# Patient Record
Sex: MALE | Race: WHITE
[De-identification: names, ages, dates, MRNs, and addresses within clinical notes are randomized per-mention and may not be internally consistent; named-entity substitution may affect disease eponyms.]

---

## 2017-10-18 ENCOUNTER — TRANSCRIPTION ENCOUNTER (OUTPATIENT)
Age: 67
End: 2017-10-18

## 2017-12-22 ENCOUNTER — TRANSCRIPTION ENCOUNTER (OUTPATIENT)
Age: 67
End: 2017-12-22

## 2018-04-28 ENCOUNTER — EMERGENCY (EMERGENCY)
Facility: HOSPITAL | Age: 68
LOS: 0 days | Discharge: HOME | End: 2018-04-28
Attending: EMERGENCY MEDICINE | Admitting: EMERGENCY MEDICINE

## 2018-04-28 VITALS — HEART RATE: 89 BPM | DIASTOLIC BLOOD PRESSURE: 80 MMHG | SYSTOLIC BLOOD PRESSURE: 125 MMHG

## 2018-04-28 VITALS
TEMPERATURE: 98 F | HEART RATE: 120 BPM | SYSTOLIC BLOOD PRESSURE: 138 MMHG | HEIGHT: 68 IN | WEIGHT: 179.9 LBS | OXYGEN SATURATION: 96 % | RESPIRATION RATE: 18 BRPM | DIASTOLIC BLOOD PRESSURE: 90 MMHG

## 2018-04-28 DIAGNOSIS — M79.674 PAIN IN RIGHT TOE(S): ICD-10-CM

## 2018-04-28 DIAGNOSIS — Z79.899 OTHER LONG TERM (CURRENT) DRUG THERAPY: ICD-10-CM

## 2018-04-28 DIAGNOSIS — L53.9 ERYTHEMATOUS CONDITION, UNSPECIFIED: ICD-10-CM

## 2018-04-28 DIAGNOSIS — R25.1 TREMOR, UNSPECIFIED: ICD-10-CM

## 2018-04-28 DIAGNOSIS — M79.89 OTHER SPECIFIED SOFT TISSUE DISORDERS: ICD-10-CM

## 2018-04-28 DIAGNOSIS — R00.0 TACHYCARDIA, UNSPECIFIED: ICD-10-CM

## 2018-04-28 RX ORDER — COLCHICINE 0.6 MG
1.2 TABLET ORAL ONCE
Qty: 0 | Refills: 0 | Status: COMPLETED | OUTPATIENT
Start: 2018-04-28 | End: 2018-04-28

## 2018-04-28 RX ORDER — COLCHICINE 0.6 MG
1 TABLET ORAL
Qty: 6 | Refills: 0
Start: 2018-04-28 | End: 2018-04-30

## 2018-04-28 RX ADMIN — Medication 1.2 MILLIGRAM(S): at 08:09

## 2018-04-28 NOTE — ED PROVIDER NOTE - NS ED ROS FT
Cardiac:  No chest pain  Respiratory:  No cough   GI:  No nausea, vomiting, diarrhea or abdominal pain.  MS:  No back pain.  Neuro:  No headache  Endocrine: No history of thyroid disease or diabetes.

## 2018-04-28 NOTE — ED PROVIDER NOTE - OBJECTIVE STATEMENT
68 y M pmh including bipolar disorder, gout, cc swelling and pain of right 1st toe x 6 days, says feels same as gout in past. says he had a similar swelling and pain to left 1st toe two days prior to onset of current symptoms that self resolved. no fever, no nausea or vomiting.

## 2018-04-28 NOTE — ED PROVIDER NOTE - PHYSICAL EXAMINATION
CONSTITUTIONAL: Well-developed; well-nourished; in no acute distress.   SKIN: warm, dry  CARD: S1, S2 normal; no murmurs, gallops, or rubs. Regular rate and rhythm.   RESP: No wheezes, rales or rhonchi.  EXT: scant erythema and swelling to base of right 1st toe, overlying MTP joint, painful to light brushing and palpation, normal neurovascular exam right foot   LYMPH: No acute cervical adenopathy.  NEURO: Alert, oriented, grossly unremarkable  PSYCH: Cooperative, appropriate.

## 2018-04-28 NOTE — ED PROVIDER NOTE - ATTENDING CONTRIBUTION TO CARE
I personally evaluated the patient. I reviewed the Resident’s or Physician Assistant’s note (as assigned above), and agree with the findings and plan except as documented in my note.  Pt with h/o gout with podagra on right, has small ecchymosis though does not recal trauma, xr with no fracture, will give colchicine; tachycardia noted, pt reports anxiety and PTSD and is tremulous in ED, denies etoh/benzo use or recent cessation, states "I'm an anxious person," no fever. Patient counseled regarding conditions which should prompt return.

## 2018-05-01 ENCOUNTER — TRANSCRIPTION ENCOUNTER (OUTPATIENT)
Age: 68
End: 2018-05-01

## 2018-06-19 ENCOUNTER — TRANSCRIPTION ENCOUNTER (OUTPATIENT)
Age: 68
End: 2018-06-19

## 2019-11-25 ENCOUNTER — OUTPATIENT (OUTPATIENT)
Dept: OUTPATIENT SERVICES | Facility: HOSPITAL | Age: 69
LOS: 1 days | Discharge: HOME | End: 2019-11-25

## 2019-11-25 DIAGNOSIS — M10.9 GOUT, UNSPECIFIED: ICD-10-CM

## 2019-11-25 DIAGNOSIS — E88.1 LIPODYSTROPHY, NOT ELSEWHERE CLASSIFIED: ICD-10-CM

## 2019-11-25 DIAGNOSIS — D64.9 ANEMIA, UNSPECIFIED: ICD-10-CM

## 2019-11-25 DIAGNOSIS — E11.9 TYPE 2 DIABETES MELLITUS WITHOUT COMPLICATIONS: ICD-10-CM

## 2019-11-25 DIAGNOSIS — I10 ESSENTIAL (PRIMARY) HYPERTENSION: ICD-10-CM

## 2019-11-25 DIAGNOSIS — M81.8 OTHER OSTEOPOROSIS WITHOUT CURRENT PATHOLOGICAL FRACTURE: ICD-10-CM

## 2019-11-27 ENCOUNTER — EMERGENCY (EMERGENCY)
Facility: HOSPITAL | Age: 69
LOS: 1 days | Discharge: HOME | End: 2019-11-27
Admitting: EMERGENCY MEDICINE
Payer: MEDICARE

## 2019-11-27 VITALS
OXYGEN SATURATION: 97 % | DIASTOLIC BLOOD PRESSURE: 99 MMHG | RESPIRATION RATE: 18 BRPM | SYSTOLIC BLOOD PRESSURE: 166 MMHG | HEART RATE: 72 BPM

## 2019-11-27 VITALS
HEIGHT: 68 IN | SYSTOLIC BLOOD PRESSURE: 190 MMHG | OXYGEN SATURATION: 96 % | HEART RATE: 93 BPM | TEMPERATURE: 98 F | WEIGHT: 219.58 LBS | RESPIRATION RATE: 20 BRPM | DIASTOLIC BLOOD PRESSURE: 115 MMHG

## 2019-11-27 DIAGNOSIS — H92.02 OTALGIA, LEFT EAR: ICD-10-CM

## 2019-11-27 DIAGNOSIS — H66.93 OTITIS MEDIA, UNSPECIFIED, BILATERAL: ICD-10-CM

## 2019-11-27 DIAGNOSIS — Z88.0 ALLERGY STATUS TO PENICILLIN: ICD-10-CM

## 2019-11-27 DIAGNOSIS — H60.93 UNSPECIFIED OTITIS EXTERNA, BILATERAL: ICD-10-CM

## 2019-11-27 PROCEDURE — 99283 EMERGENCY DEPT VISIT LOW MDM: CPT

## 2019-11-27 RX ORDER — CLARITHROMYCIN 500 MG
1 TABLET ORAL
Qty: 1 | Refills: 0
Start: 2019-11-27 | End: 2019-12-01

## 2019-11-27 RX ORDER — NEOMYCIN/POLYMYXIN B/HYDROCORT
2 SUSPENSION, DROPS(FINAL DOSAGE FORM)(ML) OTIC (EAR)
Qty: 1 | Refills: 0
Start: 2019-11-27 | End: 2019-12-03

## 2019-11-27 NOTE — ED ADULT NURSE NOTE - OBJECTIVE STATEMENT
Pt presents to ED c/o ear pain. Pt endorses x2 weeks of L ear pain, was seen by his MD and given augmentin for ear infection, pt reports he took he pills that day "and my face swelled up and felt like it was on fire and I vomited" so pt went to another hospital that day, reports he was given prednisone which resolved the facial swelling, though pt was not given a new abx rx. Pt endorses continued pain to L ear, 8/10, no f/c, no throat pain, no cough, no dizziness. Pt presents in NAD speaking full sentences ambulatory through triage.

## 2019-11-27 NOTE — ED PROVIDER NOTE - PATIENT PORTAL LINK FT
You can access the FollowMyHealth Patient Portal offered by [facility] by registering at the following website: [email or web address]. By joining [employer]’s FollowMyHealth portal, you will also be able to view your health information using other applications (apps) compatible with our system.

## 2019-11-27 NOTE — ED PROVIDER NOTE - NSFOLLOWUPINSTRUCTIONS_ED_ALL_ED_FT
Otitis Media    Otitis media is inflammation of the middle ear. Otitis media may be caused by allergies or, most commonly, by a viral or bacterial infection. Symptoms may include earache, fever, ringing in your ears, leakage of fluid from ear, or hearing changes. If you were prescribed an antibiotic medicine, be sure to finish it all even if you start to feel better.     SEEK IMMEDIATE MEDICAL CARE IF YOU HAVE ANY OF THE FOLLOWING SYMPTOMS: pain that is not controlled with medicine, swelling/redness/pain around your ear, facial paralysis, tenderness of the bone behind your ear when you touch it, neck lump or neck stiffness.    Otitis Externa    Otitis externa is a bacterial or fungal infection of the outer ear canal. This is the area from the eardrum to the outside of the ear. Otitis externa is sometimes called "swimmer's ear." Causes include swimming in dirty water, moisture remaining in ear after swimming or bathing, trauma to the ear, objects stuck in the ear, or cuts or scrapes in the outside of the ear. Symptoms include itching, pain, swelling, redness in the ear canal, and fluid coming from the ear. To prevent recurrence of otitis externa, keep your ear dry, avoid scratching or putting objects inside your ear including cotton swabs, and avoid swimming in polluted water or poorly chlorinated pools.    SEEK IMMEDIATE MEDICAL CARE IF YOU HAVE ANY OF THE FOLLOWING SYMPTOMS: fever, worsening symptoms, headache, redness/swelling/pain over the bone behind your ear.

## 2019-11-27 NOTE — ED ADULT TRIAGE NOTE - CHIEF COMPLAINT QUOTE
Left ear pain for two weeks, Saw a Doctor and was given amoxicillin, pt stopped taking antibiotics s/p vomiting.

## 2019-11-27 NOTE — ED PROVIDER NOTE - CLINICAL SUMMARY MEDICAL DECISION MAKING FREE TEXT BOX
68 yo M with pmh of HTN, depression c/o L ear pain x 2 week and R ear pain x 1 day. Pt states he suffers from earaches on and off all his life. Went tos [facility] p[facility] on Monday and was given amoxicillin and his face became hot, red and swelled. Pt seen at [facility] and was treated with prednisone for an allergic reaction. Denies fever, chills, sore throat, lip/tongue swelling. L ear- swelling of canal with purulent discharge, TM erythematous. R ear- +swelling of canal with discharge, TM pearly grey. No mastoid tenderness. Will treat for OM/EM with zpack given pcn allergy. Refer to ENT

## 2019-11-27 NOTE — ED PROVIDER NOTE - OBJECTIVE STATEMENT
70 yo M with pmh of HTN, depression c/o L ear pain x 2 week and R ear pain x 1 day. Pt states he suffers from earaches on and off all his life. Went tos [facility] is pmd on Monday and was given amoxicillin and his face became hot, red and swelled. Pt seen at [facility] and was treated with prednisone for an allergic reaction. Denies fever, chills, sore throat, lip/tongue swelling.

## 2019-11-27 NOTE — ED PROVIDER NOTE - PHYSICAL EXAMINATION
CONSTITUTIONAL: resting tremors (chronic), NAD, sitting in chair   HEAD: Normocephalic; atraumatic.   EYES: PERRL; EOM intact; conjunctiva and sclera clear  ENT: normal nose; no rhinorrhea; normal pharynx with no erythema or lesions. L ear- swelling of canal with purulent discharge, TM erythematous. R ear- +swelling of canal with discharge, TM pearly grey. No mastoid tenderness   NECK: Supple; non-tender;   CARDIOVASCULAR: Normal S1, S2; no murmurs, rubs, or gallops. Regular rate and rhythm.   RESPIRATORY: Breathing easily; breath sounds clear and equal bilaterally; no wheezes, rhonchi, or rales.  MSK: FROM at all extremities, normal tone   EXT: No cyanosis or edema; N/V intact  SKIN: Normal for age and race; warm; dry; good turgor; no apparent lesions or rash.

## 2020-06-13 ENCOUNTER — EMERGENCY (EMERGENCY)
Facility: HOSPITAL | Age: 70
LOS: 0 days | Discharge: HOME | End: 2020-06-13
Attending: EMERGENCY MEDICINE | Admitting: EMERGENCY MEDICINE
Payer: MEDICARE

## 2020-06-13 VITALS
RESPIRATION RATE: 16 BRPM | DIASTOLIC BLOOD PRESSURE: 91 MMHG | TEMPERATURE: 98 F | WEIGHT: 179.9 LBS | SYSTOLIC BLOOD PRESSURE: 145 MMHG | HEART RATE: 84 BPM | OXYGEN SATURATION: 96 %

## 2020-06-13 DIAGNOSIS — Z88.1 ALLERGY STATUS TO OTHER ANTIBIOTIC AGENTS STATUS: ICD-10-CM

## 2020-06-13 DIAGNOSIS — F41.9 ANXIETY DISORDER, UNSPECIFIED: ICD-10-CM

## 2020-06-13 DIAGNOSIS — F41.0 PANIC DISORDER [EPISODIC PAROXYSMAL ANXIETY]: ICD-10-CM

## 2020-06-13 PROCEDURE — 99284 EMERGENCY DEPT VISIT MOD MDM: CPT | Mod: GC

## 2020-06-13 PROCEDURE — 93010 ELECTROCARDIOGRAM REPORT: CPT

## 2020-06-13 RX ORDER — ACETAMINOPHEN 500 MG
650 TABLET ORAL ONCE
Refills: 0 | Status: COMPLETED | OUTPATIENT
Start: 2020-06-13 | End: 2020-06-13

## 2020-06-13 RX ADMIN — Medication 650 MILLIGRAM(S): at 09:15

## 2020-06-13 NOTE — ED ADULT NURSE NOTE - CHIEF COMPLAINT QUOTE
feeling of anxiety x 1 day, is compliant with medication, denies suicidal and homicidal ideation abd pain s/p vaginal birth x1wk

## 2020-06-13 NOTE — ED PROVIDER NOTE - ATTENDING CONTRIBUTION TO CARE
71yo man h/o HTN, depression on lithirum, zoloft, seroquel, klonopin c/o typical panic attack sx, woke from sleep at 4am feeling very anxious and shaky, with pressure in his head. No visual changes, nausea, vomiting. No chest pain, SOB. Sx exactly the same as prior panic attacks, pt reports that he has them about every 6 weeks. He has been taking meds on schedule, no more or less than usual. On exam he is nontoxic appearing, VS as noted, reports feeling better on my eval. Lungs CTA, CVS1S2 RRR abd soft, NT. Sx likely due to chronic anxiety, doubt other organic issues. Will observe and likely d/c.

## 2020-06-13 NOTE — ED PROVIDER NOTE - CHILD ABUSE FACILITY
YOU MAY TAKE TYLENOL 500MG EVERY 4HRS IN ADDITION TO PRESCRIBED IBUPROFEN AS NEEDED FOR PAIN.    Middle Ear Infection (Adult)  You have an infection of the middle ear (the space behind the eardrum). This is also called acute otitis media (AOM). Sometimes it is caused by the common cold. This is because congestion can block the internal passage (eustachian tube) that drains fluid from the middle ear. When the middle ear fills with fluid, bacteria can grow there and cause an infection. Oral antibiotics are used to treat this illness, not ear drops. Symptoms usually start to improve within 1 to 2 days of treatment.    Home care  The following are general care guidelines:  · Finish all of the antibiotic medicine given, even though you may feel better after the first few days.  · You may use acetaminophen or ibuprofen to control pain, unless something else was prescribed. [NOTE: If you have chronic liver or kidney disease or have ever had a stomach ulcer or GI bleeding, talk with your doctor before using these medicines.] Do not give aspirin to anyone under 18 years of age who has a fever. It may cause severe liver damage.  Follow-up care  Follow up with your doctor in 2 weeks if all symptoms have not gotten better, or if hearing doesn't go back to normal within 1 month.  When to seek medical care  Get prompt medical attention if any of the following occur:  · Ear pain gets worse or does not improve after 3 days of treatment  · Unusual drowsiness or confusion  · Neck pain, stiff neck, or headache  · Fluid or blood draining from the ear canal  · Fever of 100.4°F (38°C) or higher after 3 days of antibiotics, or as directed by your health care provider  · Convulsion (seizure)  © 4943-4661 [employer]. [address] [place] [id number]. All rights reserved. This information is not intended as a substitute for professional medical care. Always follow your healthcare professional's instructions.        
[facility]

## 2020-06-13 NOTE — ED PROVIDER NOTE - CLINICAL SUMMARY MEDICAL DECISION MAKING FREE TEXT BOX
EKG ok. Pt comfortable, anxiety has improved. Feels ready to go home. Has f/u with his psychiatrist 6/16. Will d/c home.

## 2020-06-13 NOTE — ED PROVIDER NOTE - NSFOLLOWUPINSTRUCTIONS_ED_ALL_ED_FT
1. Continue your medications as prescribed  2. Follow up with your psychiatrist on tuesday as scheduled  3. Return to the ED immediately for persistent or worsening symptoms    Anxiety    Generalized anxiety disorder (JAVIER) is a mental disorder. It is defined as anxiety that is not necessarily related to specific events or activities or is out of proportion to said events. Symptoms include restlessness, fatigue, difficulty concentrations, irritability and difficulty concentrating. It may interfere with life functions, including relationships, work, and school. If you were started on a medication, make sure to take exactly as prescribed and follow up with a psychiatrist.    SEEK IMMEDIATE MEDICAL CARE IF YOU HAVE ANY OF THE FOLLOWING SYMPTOMS: thoughts about hurting killing yourself, thoughts about hurting or killing somebody else, hallucinations, or worsening depression.

## 2020-06-13 NOTE — ED PROVIDER NOTE - OBJECTIVE STATEMENT
70 y.o. M with pmh of HTN, depression with panic attack started this morning woke him up from his sleep, happened before in the past similarly. Pt sees the psychiatrist regularly, and has an appointment for the 16th. No CP + Headache, no SOB no urinary symptoms. Headache similar to other times he's had headaches.

## 2020-06-13 NOTE — ED PROVIDER NOTE - PATIENT PORTAL LINK FT
You can access the FollowMyHealth Patient Portal offered by [facility] by registering at the following website: [email or web address]. By joining [employer]’s FollowMyHealth portal, you will also be able to view your health information using other applications (apps) compatible with our system.

## 2020-11-11 ENCOUNTER — EMERGENCY (EMERGENCY)
Facility: HOSPITAL | Age: 70
LOS: 0 days | Discharge: HOME | End: 2020-11-11
Attending: EMERGENCY MEDICINE | Admitting: EMERGENCY MEDICINE
Payer: MEDICARE

## 2020-11-11 VITALS
OXYGEN SATURATION: 99 % | HEIGHT: 68 IN | SYSTOLIC BLOOD PRESSURE: 141 MMHG | DIASTOLIC BLOOD PRESSURE: 87 MMHG | HEART RATE: 65 BPM | TEMPERATURE: 99 F | RESPIRATION RATE: 17 BRPM

## 2020-11-11 VITALS
HEART RATE: 71 BPM | TEMPERATURE: 98 F | DIASTOLIC BLOOD PRESSURE: 86 MMHG | SYSTOLIC BLOOD PRESSURE: 168 MMHG | RESPIRATION RATE: 18 BRPM | OXYGEN SATURATION: 97 %

## 2020-11-11 DIAGNOSIS — Z87.891 PERSONAL HISTORY OF NICOTINE DEPENDENCE: ICD-10-CM

## 2020-11-11 DIAGNOSIS — Z88.8 ALLERGY STATUS TO OTHER DRUGS, MEDICAMENTS AND BIOLOGICAL SUBSTANCES: ICD-10-CM
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
## 2020-11-11 DIAGNOSIS — M54.2 CERVICALGIA: ICD-10-CM

## 2020-11-11 DIAGNOSIS — K02.9 DENTAL CARIES, UNSPECIFIED: ICD-10-CM
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
## 2020-11-11 DIAGNOSIS — S06.9X1A UNSPECIFIED INTRACRANIAL INJURY WITH LOSS OF CONSCIOUSNESS OF 30 MINUTES OR LESS, INITIAL ENCOUNTER: ICD-10-CM

## 2020-11-11 PROCEDURE — 70450 CT HEAD/BRAIN W/O DYE: CPT | Mod: 26

## 2020-11-11 PROCEDURE — 71045 X-RAY EXAM CHEST 1 VIEW: CPT | Mod: 26

## 2020-11-11 PROCEDURE — 72125 CT NECK SPINE W/O DYE: CPT | Mod: 26

## 2020-11-11 PROCEDURE — 73080 X-RAY EXAM OF ELBOW: CPT | Mod: 26,LT

## 2020-11-11 PROCEDURE — 99284 EMERGENCY DEPT VISIT MOD MDM: CPT

## 2020-11-11 RX ORDER — ACETAMINOPHEN 500 MG
975 TABLET ORAL ONCE
Refills: 0 | Status: COMPLETED | OUTPATIENT
Start: 2020-11-11 | End: 2020-11-11

## 2020-11-11 RX ADMIN — Medication 975 MILLIGRAM(S): at 14:52
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
## 2020-11-11 NOTE — ED ADULT TRIAGE NOTE - DOMESTIC TRAVEL HIGH RISK QUESTION
[FreeTextEntry1] : Nay [phone] daughter
No

## 2020-11-11 NOTE — ED PROVIDER NOTE - DIAGNOSTIC INTERPRETATION
ED Attending Physician: JOSEPH Spears elbow xray: NO ACUTE FRACTURE OR DISLOCATION. NO BONY ABNORMALITY.

## 2020-11-11 NOTE — ED PROVIDER NOTE - NSFOLLOWUPCLINICS_GEN_ALL_ED_FT
[facility] Concussion Program  Concussion Program  [address] [place] [address]   Phone: [phone]  Fax:   Follow Up Time: 1-3 Days
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
## 2020-11-11 NOTE — ED PROVIDER NOTE - NSFOLLOWUPINSTRUCTIONS_ED_ALL_ED_FT
Follow up with your primary medical doctor in 1-2 days as well as with the concussion clinic    Closed Head Injury    Closed head injury in an injury to your head that may or may not involve a traumatic brain injury (TBI). Symptoms of TBI can be short or long lasting and include headache, dizziness, interference with memory or speech, fatigue, confusion, changes in sleep, mood changes, nausea, depression/anxiety, and dulling of senses. Make sure to obtain proper rest which includes getting plenty of sleep, avoiding excessive visual stimulation, and avoiding activities that may cause physical or mental stress. Avoid any situation where there is potential for another head injury including sports.    SEEK MEDICAL CARE IF YOU HAVE THE FOLLOWING SYMPTOMS: unusual drowsiness, vomiting, severe dizziness, seizures, lightheadedness, muscular weakness, different pupil sizes, visual changes, or clear or bloody discharge from your ears or nose.

## 2020-11-11 NOTE — ED PROVIDER NOTE - NS ED ROS FT
Constitutional: (-) fever (-) dizziness  Eyes/ENT: (-) blurry vision, (-) epistaxis (+) sensation of loose teeth (-) rhinorrhea  Cardiovascular: (-) chest pain, (-) syncope  Respiratory: (-) cough, (-) shortness of breath  Gastrointestinal: (-) vomiting, (-) diarrhea (-) nausea  Musculoskeletal: (+) neck pain, (-) back pain, (+) joint pain  Integumentary: (-) rash, (-) edema   Neurological: (+) headache, (-) altered mental status (+) LOC (-) paresthesias  Psychiatric: (-) hallucinations  Allergic/Immunologic: (-) pruritus

## 2020-11-11 NOTE — ED PROVIDER NOTE - PATIENT PORTAL LINK FT
You can access the FollowMyHealth Patient Portal offered by [facility] by registering at the following website: [email or web address]. By joining [employer]’s FollowMyHealth portal, you will also be able to view your health information using other applications (apps) compatible with our system.
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
## 2021-05-21 NOTE — ED ADULT NURSE NOTE - NS ED PATIENT SAFETY CONCERN
H&P reviewed  After examining the patient I find no changes in the patients condition since the H&P had been written      Vitals:    05/21/21 1306   BP: 126/68   Pulse: 86   Resp: 16   Temp: (!) 97 3 °F (36 3 °C)   SpO2: 95% No

## 2021-12-15 ENCOUNTER — INPATIENT (INPATIENT)
Facility: HOSPITAL | Age: 71
LOS: 1 days | Discharge: HOME | End: 2021-12-17
Attending: HOSPITALIST | Admitting: HOSPITALIST
Payer: MEDICARE

## 2021-12-15 VITALS
HEART RATE: 97 BPM | TEMPERATURE: 98 F | SYSTOLIC BLOOD PRESSURE: 176 MMHG | DIASTOLIC BLOOD PRESSURE: 106 MMHG | HEIGHT: 68 IN | RESPIRATION RATE: 18 BRPM | OXYGEN SATURATION: 98 %

## 2021-12-15 DIAGNOSIS — R41.0 DISORIENTATION, UNSPECIFIED: ICD-10-CM

## 2021-12-15 DIAGNOSIS — R55 SYNCOPE AND COLLAPSE: ICD-10-CM

## 2021-12-15 DIAGNOSIS — F41.9 ANXIETY DISORDER, UNSPECIFIED: ICD-10-CM

## 2021-12-15 DIAGNOSIS — F43.10 POST-TRAUMATIC STRESS DISORDER, UNSPECIFIED: ICD-10-CM

## 2021-12-15 DIAGNOSIS — R25.1 TREMOR, UNSPECIFIED: ICD-10-CM

## 2021-12-15 DIAGNOSIS — F31.81 BIPOLAR II DISORDER: ICD-10-CM

## 2021-12-15 DIAGNOSIS — I45.10 UNSPECIFIED RIGHT BUNDLE-BRANCH BLOCK: ICD-10-CM

## 2021-12-15 DIAGNOSIS — D72.829 ELEVATED WHITE BLOOD CELL COUNT, UNSPECIFIED: ICD-10-CM

## 2021-12-15 DIAGNOSIS — Z60.2 PROBLEMS RELATED TO LIVING ALONE: ICD-10-CM

## 2021-12-15 DIAGNOSIS — Z88.0 ALLERGY STATUS TO PENICILLIN: ICD-10-CM

## 2021-12-15 DIAGNOSIS — N18.30 CHRONIC KIDNEY DISEASE, STAGE 3 UNSPECIFIED: ICD-10-CM

## 2021-12-15 LAB
ALBUMIN SERPL ELPH-MCNC: 4.7 G/DL — SIGNIFICANT CHANGE UP (ref 3.5–5.2)
ALP SERPL-CCNC: 86 U/L — SIGNIFICANT CHANGE UP (ref 30–115)
ALT FLD-CCNC: 16 U/L — SIGNIFICANT CHANGE UP (ref 0–41)
ANION GAP SERPL CALC-SCNC: 17 MMOL/L — HIGH (ref 7–14)
APAP SERPL-MCNC: <5 UG/ML — LOW (ref 10–30)
AST SERPL-CCNC: 19 U/L — SIGNIFICANT CHANGE UP (ref 0–41)
BASOPHILS # BLD AUTO: 0.06 K/UL — SIGNIFICANT CHANGE UP (ref 0–0.2)
BASOPHILS NFR BLD AUTO: 0.5 % — SIGNIFICANT CHANGE UP (ref 0–1)
BILIRUB SERPL-MCNC: 0.5 MG/DL — SIGNIFICANT CHANGE UP (ref 0.2–1.2)
BUN SERPL-MCNC: 16 MG/DL — SIGNIFICANT CHANGE UP (ref 10–20)
CALCIUM SERPL-MCNC: 10.5 MG/DL — HIGH (ref 8.5–10.1)
CHLORIDE SERPL-SCNC: 105 MMOL/L — SIGNIFICANT CHANGE UP (ref 98–110)
CO2 SERPL-SCNC: 18 MMOL/L — SIGNIFICANT CHANGE UP (ref 17–32)
CREAT SERPL-MCNC: 1.3 MG/DL — SIGNIFICANT CHANGE UP (ref 0.7–1.5)
EOSINOPHIL # BLD AUTO: 0.23 K/UL — SIGNIFICANT CHANGE UP (ref 0–0.7)
EOSINOPHIL NFR BLD AUTO: 2 % — SIGNIFICANT CHANGE UP (ref 0–8)
ETHANOL SERPL-MCNC: <10 MG/DL — SIGNIFICANT CHANGE UP
GLUCOSE SERPL-MCNC: 106 MG/DL — HIGH (ref 70–99)
HCT VFR BLD CALC: 48.6 % — SIGNIFICANT CHANGE UP (ref 42–52)
HGB BLD-MCNC: 16.9 G/DL — SIGNIFICANT CHANGE UP (ref 14–18)
IMM GRANULOCYTES NFR BLD AUTO: 0.7 % — HIGH (ref 0.1–0.3)
LITHIUM SERPL-MCNC: 1.03 MMOL/L — SIGNIFICANT CHANGE UP (ref 0.6–1.2)
LYMPHOCYTES # BLD AUTO: 2.67 K/UL — SIGNIFICANT CHANGE UP (ref 1.2–3.4)
LYMPHOCYTES # BLD AUTO: 23.7 % — SIGNIFICANT CHANGE UP (ref 20.5–51.1)
MCHC RBC-ENTMCNC: 33.9 PG — HIGH (ref 27–31)
MCHC RBC-ENTMCNC: 34.8 G/DL — SIGNIFICANT CHANGE UP (ref 32–37)
MCV RBC AUTO: 97.6 FL — HIGH (ref 80–94)
MONOCYTES # BLD AUTO: 0.41 K/UL — SIGNIFICANT CHANGE UP (ref 0.1–0.6)
MONOCYTES NFR BLD AUTO: 3.6 % — SIGNIFICANT CHANGE UP (ref 1.7–9.3)
NEUTROPHILS # BLD AUTO: 7.82 K/UL — HIGH (ref 1.4–6.5)
NEUTROPHILS NFR BLD AUTO: 69.5 % — SIGNIFICANT CHANGE UP (ref 42.2–75.2)
NRBC # BLD: 0 /100 WBCS — SIGNIFICANT CHANGE UP (ref 0–0)
PLATELET # BLD AUTO: 243 K/UL — SIGNIFICANT CHANGE UP (ref 130–400)
POTASSIUM SERPL-MCNC: 4.7 MMOL/L — SIGNIFICANT CHANGE UP (ref 3.5–5)
POTASSIUM SERPL-SCNC: 4.7 MMOL/L — SIGNIFICANT CHANGE UP (ref 3.5–5)
PROT SERPL-MCNC: 7.4 G/DL — SIGNIFICANT CHANGE UP (ref 6–8)
RBC # BLD: 4.98 M/UL — SIGNIFICANT CHANGE UP (ref 4.7–6.1)
RBC # FLD: 13 % — SIGNIFICANT CHANGE UP (ref 11.5–14.5)
SALICYLATES SERPL-MCNC: <0.3 MG/DL — LOW (ref 4–30)
SODIUM SERPL-SCNC: 140 MMOL/L — SIGNIFICANT CHANGE UP (ref 135–146)
TROPONIN T SERPL-MCNC: <0.01 NG/ML — SIGNIFICANT CHANGE UP
WBC # BLD: 11.27 K/UL — HIGH (ref 4.8–10.8)
WBC # FLD AUTO: 11.27 K/UL — HIGH (ref 4.8–10.8)

## 2021-12-15 PROCEDURE — 99285 EMERGENCY DEPT VISIT HI MDM: CPT

## 2021-12-15 PROCEDURE — 70450 CT HEAD/BRAIN W/O DYE: CPT | Mod: 26,MA

## 2021-12-15 PROCEDURE — 71046 X-RAY EXAM CHEST 2 VIEWS: CPT | Mod: 26

## 2021-12-15 PROCEDURE — 93010 ELECTROCARDIOGRAM REPORT: CPT

## 2021-12-15 RX ORDER — CLONAZEPAM 1 MG
1 TABLET ORAL ONCE
Refills: 0 | Status: DISCONTINUED | OUTPATIENT
Start: 2021-12-15 | End: 2021-12-15

## 2021-12-15 RX ADMIN — Medication 1 MILLIGRAM(S): at 23:21

## 2021-12-15 SDOH — SOCIAL STABILITY - SOCIAL INSECURITY: PROBLEMS RELATED TO LIVING ALONE: Z60.2

## 2021-12-15 NOTE — ED BEHAVIORAL HEALTH ASSESSMENT NOTE - SELF INJURIOUS BEHAVIOR WITHOUT SUICIDAL INTENT:
2 RN skin check complete with FLAQUITO [place].  Devices in place JOHAN drain (removed at 1215), sequential stockings, pulse ox, blood pressure cuff, prevena wound vac, central line, and one PIV.   Skin assessed under the following devices all listed above.   Preventative measures in place including mepliex to coccyx, waffle cushion in chair, q2h turns and mobility, heels floated on pillows.  Following areas of concern:   · Blistered area to right LQ of abdomen around JOHAN site.  JOHAN drain removed, clean dressing placed.  Midline prevena dressing intact.   The following interventions in place mepilex to coccyx, waffle cushion, dressing to JOHAN site, drain removed.     Wound consult placedYES/NO: no    Wound reported YES/NO: no  Appropriate LDAs opened YES/NO: no     None known
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
## 2021-12-15 NOTE — ED BEHAVIORAL HEALTH ASSESSMENT NOTE - CURRENT MEDICATION
Reportedly on Li 600 qhs, Zoloft 100 mg qd and 50 qhs, and Klonopin 2 mg BID from [facility] [phone] (DOSAGES NOT CONFIRMED AS PHARMACY CLOSED.)     Found on iSTOP: Pt picked up Klonopin 1 mg BID 1mo supply 2d ago.

## 2021-12-15 NOTE — ED PROVIDER NOTE - CLINICAL SUMMARY MEDICAL DECISION MAKING FREE TEXT BOX
pt evaluated for agitation, pt calm and cooperative in  ED, labs reviewed, CTH no acute changes, pt evaluated by psych. Advised medical admission for further workup.
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
## 2021-12-15 NOTE — ED PROVIDER NOTE - OBJECTIVE STATEMENT
71Y M with PMH of PTSD, Bipolar on lithium, Clonapin, Sertraline presents with CC of syncope and agitated behavior as per triage note. Patient reports that he was walking outside, and fell, +LOC. Patient does not remember how he fell but endorses that he has a hx of falling, as he is being worked up for MS and has generalized tremors. Denies head injury, HA, chest pain, SOB, abdominal pain, recent illness, N/V/D. Patient reports to being compliant with his medications. Lives with wife at home. Patient reports to previous psych hospitalization many years ago. Denies suicidal ideation and patient does not recall his agitated behavior that is documented in the triage note.

## 2021-12-16 DIAGNOSIS — F31.9 BIPOLAR DISORDER, UNSPECIFIED: ICD-10-CM

## 2021-12-16 LAB
A1C WITH ESTIMATED AVERAGE GLUCOSE RESULT: 4.7 % — SIGNIFICANT CHANGE UP (ref 4–5.6)
ANION GAP SERPL CALC-SCNC: 18 MMOL/L — HIGH (ref 7–14)
BASOPHILS # BLD AUTO: 0.06 K/UL — SIGNIFICANT CHANGE UP (ref 0–0.2)
BASOPHILS NFR BLD AUTO: 0.6 % — SIGNIFICANT CHANGE UP (ref 0–1)
BUN SERPL-MCNC: 16 MG/DL — SIGNIFICANT CHANGE UP (ref 10–20)
CALCIUM SERPL-MCNC: 10.3 MG/DL — HIGH (ref 8.5–10.1)
CHLORIDE SERPL-SCNC: 105 MMOL/L — SIGNIFICANT CHANGE UP (ref 98–110)
CHOLEST SERPL-MCNC: 251 MG/DL — HIGH
CO2 SERPL-SCNC: 17 MMOL/L — SIGNIFICANT CHANGE UP (ref 17–32)
CREAT SERPL-MCNC: 1.3 MG/DL — SIGNIFICANT CHANGE UP (ref 0.7–1.5)
EOSINOPHIL # BLD AUTO: 0.42 K/UL — SIGNIFICANT CHANGE UP (ref 0–0.7)
EOSINOPHIL NFR BLD AUTO: 4.2 % — SIGNIFICANT CHANGE UP (ref 0–8)
ESTIMATED AVERAGE GLUCOSE: 88 MG/DL — SIGNIFICANT CHANGE UP (ref 68–114)
GLUCOSE SERPL-MCNC: 90 MG/DL — SIGNIFICANT CHANGE UP (ref 70–99)
HCT VFR BLD CALC: 49.2 % — SIGNIFICANT CHANGE UP (ref 42–52)
HCV AB S/CO SERPL IA: 0.04 COI — SIGNIFICANT CHANGE UP
HCV AB SERPL-IMP: SIGNIFICANT CHANGE UP
HDLC SERPL-MCNC: 28 MG/DL — LOW
HGB BLD-MCNC: 17.1 G/DL — SIGNIFICANT CHANGE UP (ref 14–18)
IMM GRANULOCYTES NFR BLD AUTO: 0.3 % — SIGNIFICANT CHANGE UP (ref 0.1–0.3)
LIPID PNL WITH DIRECT LDL SERPL: 172 MG/DL — HIGH
LYMPHOCYTES # BLD AUTO: 3.42 K/UL — HIGH (ref 1.2–3.4)
LYMPHOCYTES # BLD AUTO: 33.9 % — SIGNIFICANT CHANGE UP (ref 20.5–51.1)
MAGNESIUM SERPL-MCNC: 2.5 MG/DL — HIGH (ref 1.8–2.4)
MCHC RBC-ENTMCNC: 34.1 PG — HIGH (ref 27–31)
MCHC RBC-ENTMCNC: 34.8 G/DL — SIGNIFICANT CHANGE UP (ref 32–37)
MCV RBC AUTO: 98.2 FL — HIGH (ref 80–94)
MONOCYTES # BLD AUTO: 0.53 K/UL — SIGNIFICANT CHANGE UP (ref 0.1–0.6)
MONOCYTES NFR BLD AUTO: 5.3 % — SIGNIFICANT CHANGE UP (ref 1.7–9.3)
NEUTROPHILS # BLD AUTO: 5.62 K/UL — SIGNIFICANT CHANGE UP (ref 1.4–6.5)
NEUTROPHILS NFR BLD AUTO: 55.7 % — SIGNIFICANT CHANGE UP (ref 42.2–75.2)
NON HDL CHOLESTEROL: 223 MG/DL — HIGH
NRBC # BLD: 0 /100 WBCS — SIGNIFICANT CHANGE UP (ref 0–0)
PHOSPHATE SERPL-MCNC: 2.7 MG/DL — SIGNIFICANT CHANGE UP (ref 2.1–4.9)
PLATELET # BLD AUTO: 217 K/UL — SIGNIFICANT CHANGE UP (ref 130–400)
POTASSIUM SERPL-MCNC: 4.5 MMOL/L — SIGNIFICANT CHANGE UP (ref 3.5–5)
POTASSIUM SERPL-SCNC: 4.5 MMOL/L — SIGNIFICANT CHANGE UP (ref 3.5–5)
RBC # BLD: 5.01 M/UL — SIGNIFICANT CHANGE UP (ref 4.7–6.1)
RBC # FLD: 13.2 % — SIGNIFICANT CHANGE UP (ref 11.5–14.5)
SARS-COV-2 RNA SPEC QL NAA+PROBE: SIGNIFICANT CHANGE UP
SODIUM SERPL-SCNC: 140 MMOL/L — SIGNIFICANT CHANGE UP (ref 135–146)
TRIGL SERPL-MCNC: 327 MG/DL — HIGH
TSH SERPL-MCNC: 4.12 UIU/ML — SIGNIFICANT CHANGE UP (ref 0.27–4.2)
WBC # BLD: 10.08 K/UL — SIGNIFICANT CHANGE UP (ref 4.8–10.8)
WBC # FLD AUTO: 10.08 K/UL — SIGNIFICANT CHANGE UP (ref 4.8–10.8)

## 2021-12-16 PROCEDURE — 93010 ELECTROCARDIOGRAM REPORT: CPT

## 2021-12-16 PROCEDURE — 99232 SBSQ HOSP IP/OBS MODERATE 35: CPT | Mod: GC

## 2021-12-16 PROCEDURE — 99233 SBSQ HOSP IP/OBS HIGH 50: CPT | Mod: GC

## 2021-12-16 PROCEDURE — 93306 TTE W/DOPPLER COMPLETE: CPT | Mod: 26

## 2021-12-16 RX ORDER — CLONAZEPAM 1 MG
1 TABLET ORAL
Refills: 0 | Status: DISCONTINUED | OUTPATIENT
Start: 2021-12-16 | End: 2021-12-17

## 2021-12-16 RX ORDER — CHLORHEXIDINE GLUCONATE 213 G/1000ML
1 SOLUTION TOPICAL DAILY
Refills: 0 | Status: DISCONTINUED | OUTPATIENT
Start: 2021-12-16 | End: 2021-12-17

## 2021-12-16 RX ORDER — FOLIC ACID 0.8 MG
1 TABLET ORAL DAILY
Refills: 0 | Status: DISCONTINUED | OUTPATIENT
Start: 2021-12-16 | End: 2021-12-17

## 2021-12-16 RX ORDER — LITHIUM CARBONATE 300 MG/1
600 TABLET, EXTENDED RELEASE ORAL AT BEDTIME
Refills: 0 | Status: DISCONTINUED | OUTPATIENT
Start: 2021-12-16 | End: 2021-12-17

## 2021-12-16 RX ORDER — PANTOPRAZOLE SODIUM 20 MG/1
40 TABLET, DELAYED RELEASE ORAL
Refills: 0 | Status: DISCONTINUED | OUTPATIENT
Start: 2021-12-16 | End: 2021-12-17

## 2021-12-16 RX ORDER — ENOXAPARIN SODIUM 100 MG/ML
40 INJECTION SUBCUTANEOUS AT BEDTIME
Refills: 0 | Status: DISCONTINUED | OUTPATIENT
Start: 2021-12-16 | End: 2021-12-17

## 2021-12-16 RX ORDER — SERTRALINE 25 MG/1
150 TABLET, FILM COATED ORAL AT BEDTIME
Refills: 0 | Status: DISCONTINUED | OUTPATIENT
Start: 2021-12-16 | End: 2021-12-17

## 2021-12-16 RX ADMIN — LITHIUM CARBONATE 600 MILLIGRAM(S): 300 TABLET, EXTENDED RELEASE ORAL at 21:48

## 2021-12-16 RX ADMIN — ENOXAPARIN SODIUM 40 MILLIGRAM(S): 100 INJECTION SUBCUTANEOUS at 21:46

## 2021-12-16 RX ADMIN — PANTOPRAZOLE SODIUM 40 MILLIGRAM(S): 20 TABLET, DELAYED RELEASE ORAL at 06:15

## 2021-12-16 RX ADMIN — SERTRALINE 150 MILLIGRAM(S): 25 TABLET, FILM COATED ORAL at 21:47

## 2021-12-16 RX ADMIN — Medication 1 MILLIGRAM(S): at 17:20

## 2021-12-16 NOTE — PATIENT PROFILE ADULT - FALL HARM RISK - HARM RISK INTERVENTIONS

## 2021-12-16 NOTE — PROGRESS NOTE BEHAVIORAL HEALTH - NSBHCHARTREVIEWIMAGING_PSY_A_CORE FT
< from: CT Head No Cont (12.15.21 @ 20:37) >    IMPRESSION:    No acute intracranial pathology, stable exam since 11/11/2020.    --- End of Report ---    < end of copied text >
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
## 2021-12-16 NOTE — H&P ADULT - NSHPLABSRESULTS_GEN_ALL_CORE
16.9   11.27 )-----------( 243      ( 15 Dec 2021 21:27 )             48.6       12-15    140  |  105  |  16  ----------------------------<  106<H>  4.7   |  18  |  1.3    Ca    10.5<H>      15 Dec 2021 21:27    TPro  7.4  /  Alb  4.7  /  TBili  0.5  /  DBili  x   /  AST  19  /  ALT  16  /  Alk Phos  86  12-15    < from: CT Head No Cont (12.15.21 @ 20:37) >    No acute intracranial pathology, stable exam since 11/11/2020.

## 2021-12-16 NOTE — PROGRESS NOTE BEHAVIORAL HEALTH - RISK ASSESSMENT
Risk factors include his psychiatric history of mood disorder, however, he has numerous protective factors including his treatment compliance, his reported mood stbaility, his residential stability and denial of si/ hi. Risk factors include his psychiatric history of mood disorder, however, he has numerous protective factors including his treatment compliance, his reported mood stability, his residential stability and denial of si/ hi.
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
## 2021-12-16 NOTE — PROGRESS NOTE BEHAVIORAL HEALTH - NSBHCHARTREVIEWINVESTIGATE_PSY_A_CORE FT
< from: 12 Lead ECG (12.15.21 @ 20:51) >    QTC Calculation(Bazett) 442 ms    P Axis 58 degrees    R Axis -8 degrees    T Axis 5 degrees    Diagnosis Line Normal sinus rhythm  Right bundle branch block  Abnormal ECG    Confirmed by Eber Pandya ([id number]) on 12/16/2021 8:12:54 AM    < end of copied text >

## 2021-12-16 NOTE — H&P ADULT - NSICDXPASTMEDICALHX_GEN_ALL_CORE_FT
PAST MEDICAL HISTORY:  Anxiety     Bipolar disorder in full remission, most recent episode unspecified type     Post traumatic stress disorder (PTSD)     Stage 3 chronic kidney disease
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
## 2021-12-16 NOTE — H&P ADULT - NSHPPHYSICALEXAM_GEN_ALL_CORE
PHYSICAL EXAM:      Constitutional: NAD     Respiratory: clear to auscultation b/l     Cardiovascular: regular rate and rhythm no audible murmur     Gastrointestinal: soft , non tender, no organomegaly , positive bowel sounds     Extremities: no LE edema     Neurological: alert oriented x 3 grossly intact non focal     Skin: no bruises    Musculoskeletal: full ROM of joints     Psychiatric: stable
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
## 2021-12-16 NOTE — PROGRESS NOTE BEHAVIORAL HEALTH - NSBHCHARTREVIEWLAB_PSY_A_CORE FT
17.1   10.08 )-----------( 217      ( 16 Dec 2021 07:14 )             49.2   12-16    140  |  105  |  16  ----------------------------<  90  4.5   |  17  |  1.3    Ca    10.3<H>      16 Dec 2021 07:14  Phos  2.7     12-16  Mg     2.5     12-16    TPro  7.4  /  Alb  4.7  /  TBili  0.5  /  DBili  x   /  AST  19  /  ALT  16  /  AlkPhos  86  12-15    Lithium Level, Serum: 1.03 mmol/L (12.15.21 @ 21:27)

## 2021-12-16 NOTE — PROGRESS NOTE BEHAVIORAL HEALTH - NSBHCHARTREVIEWVS_PSY_A_CORE FT
Vital Signs Last 24 Hrs  T(C): 36.9 (16 Dec 2021 08:04), Max: 36.9 (16 Dec 2021 08:04)  T(F): 98.5 (16 Dec 2021 08:04), Max: 98.5 (16 Dec 2021 08:04)  HR: 60 (16 Dec 2021 08:04) (60 - 97)  BP: 158/80 (16 Dec 2021 08:04) (123/78 - 176/106)  BP(mean): --  RR: 20 (16 Dec 2021 08:04) (18 - 20)  SpO2: 98% (16 Dec 2021 08:04) (96% - 98%)
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
## 2021-12-17 ENCOUNTER — TRANSCRIPTION ENCOUNTER (OUTPATIENT)
Age: 71
End: 2021-12-17

## 2021-12-17 VITALS — DIASTOLIC BLOOD PRESSURE: 90 MMHG | HEART RATE: 90 BPM | SYSTOLIC BLOOD PRESSURE: 150 MMHG

## 2021-12-17 LAB
ALBUMIN SERPL ELPH-MCNC: 4.4 G/DL — SIGNIFICANT CHANGE UP (ref 3.5–5.2)
ALP SERPL-CCNC: 85 U/L — SIGNIFICANT CHANGE UP (ref 30–115)
ALT FLD-CCNC: 16 U/L — SIGNIFICANT CHANGE UP (ref 0–41)
ANION GAP SERPL CALC-SCNC: 15 MMOL/L — HIGH (ref 7–14)
APPEARANCE UR: CLEAR — SIGNIFICANT CHANGE UP
AST SERPL-CCNC: 19 U/L — SIGNIFICANT CHANGE UP (ref 0–41)
BASOPHILS # BLD AUTO: 0.07 K/UL — SIGNIFICANT CHANGE UP (ref 0–0.2)
BASOPHILS NFR BLD AUTO: 0.6 % — SIGNIFICANT CHANGE UP (ref 0–1)
BILIRUB SERPL-MCNC: 0.7 MG/DL — SIGNIFICANT CHANGE UP (ref 0.2–1.2)
BILIRUB UR-MCNC: NEGATIVE — SIGNIFICANT CHANGE UP
BUN SERPL-MCNC: 16 MG/DL — SIGNIFICANT CHANGE UP (ref 10–20)
CALCIUM SERPL-MCNC: 10.3 MG/DL — HIGH (ref 8.5–10.1)
CHLORIDE SERPL-SCNC: 107 MMOL/L — SIGNIFICANT CHANGE UP (ref 98–110)
CO2 SERPL-SCNC: 19 MMOL/L — SIGNIFICANT CHANGE UP (ref 17–32)
COLOR SPEC: YELLOW — SIGNIFICANT CHANGE UP
CREAT SERPL-MCNC: 1.3 MG/DL — SIGNIFICANT CHANGE UP (ref 0.7–1.5)
DIFF PNL FLD: NEGATIVE — SIGNIFICANT CHANGE UP
EOSINOPHIL # BLD AUTO: 0.39 K/UL — SIGNIFICANT CHANGE UP (ref 0–0.7)
EOSINOPHIL NFR BLD AUTO: 3.5 % — SIGNIFICANT CHANGE UP (ref 0–8)
GLUCOSE SERPL-MCNC: 96 MG/DL — SIGNIFICANT CHANGE UP (ref 70–99)
GLUCOSE UR QL: NEGATIVE — SIGNIFICANT CHANGE UP
HCT VFR BLD CALC: 47.7 % — SIGNIFICANT CHANGE UP (ref 42–52)
HGB BLD-MCNC: 16.3 G/DL — SIGNIFICANT CHANGE UP (ref 14–18)
IMM GRANULOCYTES NFR BLD AUTO: 0.3 % — SIGNIFICANT CHANGE UP (ref 0.1–0.3)
KETONES UR-MCNC: NEGATIVE — SIGNIFICANT CHANGE UP
LEUKOCYTE ESTERASE UR-ACNC: NEGATIVE — SIGNIFICANT CHANGE UP
LYMPHOCYTES # BLD AUTO: 3.71 K/UL — HIGH (ref 1.2–3.4)
LYMPHOCYTES # BLD AUTO: 33.1 % — SIGNIFICANT CHANGE UP (ref 20.5–51.1)
MAGNESIUM SERPL-MCNC: 2.3 MG/DL — SIGNIFICANT CHANGE UP (ref 1.8–2.4)
MCHC RBC-ENTMCNC: 33.5 PG — HIGH (ref 27–31)
MCHC RBC-ENTMCNC: 34.2 G/DL — SIGNIFICANT CHANGE UP (ref 32–37)
MCV RBC AUTO: 98.1 FL — HIGH (ref 80–94)
MONOCYTES # BLD AUTO: 0.64 K/UL — HIGH (ref 0.1–0.6)
MONOCYTES NFR BLD AUTO: 5.7 % — SIGNIFICANT CHANGE UP (ref 1.7–9.3)
NEUTROPHILS # BLD AUTO: 6.36 K/UL — SIGNIFICANT CHANGE UP (ref 1.4–6.5)
NEUTROPHILS NFR BLD AUTO: 56.8 % — SIGNIFICANT CHANGE UP (ref 42.2–75.2)
NITRITE UR-MCNC: NEGATIVE — SIGNIFICANT CHANGE UP
NRBC # BLD: 0 /100 WBCS — SIGNIFICANT CHANGE UP (ref 0–0)
PH UR: 6.5 — SIGNIFICANT CHANGE UP (ref 5–8)
PLATELET # BLD AUTO: 213 K/UL — SIGNIFICANT CHANGE UP (ref 130–400)
POTASSIUM SERPL-MCNC: 4.2 MMOL/L — SIGNIFICANT CHANGE UP (ref 3.5–5)
POTASSIUM SERPL-SCNC: 4.2 MMOL/L — SIGNIFICANT CHANGE UP (ref 3.5–5)
PROT SERPL-MCNC: 7 G/DL — SIGNIFICANT CHANGE UP (ref 6–8)
PROT UR-MCNC: NEGATIVE — SIGNIFICANT CHANGE UP
RBC # BLD: 4.86 M/UL — SIGNIFICANT CHANGE UP (ref 4.7–6.1)
RBC # FLD: 13.2 % — SIGNIFICANT CHANGE UP (ref 11.5–14.5)
SODIUM SERPL-SCNC: 141 MMOL/L — SIGNIFICANT CHANGE UP (ref 135–146)
SP GR SPEC: 1.01 — SIGNIFICANT CHANGE UP (ref 1.01–1.03)
UROBILINOGEN FLD QL: SIGNIFICANT CHANGE UP
WBC # BLD: 11.2 K/UL — HIGH (ref 4.8–10.8)
WBC # FLD AUTO: 11.2 K/UL — HIGH (ref 4.8–10.8)

## 2021-12-17 PROCEDURE — 99238 HOSP IP/OBS DSCHRG MGMT 30/<: CPT

## 2021-12-17 PROCEDURE — 95819 EEG AWAKE AND ASLEEP: CPT | Mod: 26

## 2021-12-17 RX ORDER — QUETIAPINE FUMARATE 200 MG/1
1 TABLET, FILM COATED ORAL
Qty: 0 | Refills: 0 | DISCHARGE

## 2021-12-17 RX ORDER — LITHIUM CARBONATE 300 MG/1
1 TABLET, EXTENDED RELEASE ORAL
Qty: 0 | Refills: 0 | DISCHARGE
Start: 2021-12-17

## 2021-12-17 RX ORDER — LITHIUM CARBONATE 300 MG/1
1 TABLET, EXTENDED RELEASE ORAL
Qty: 0 | Refills: 0 | DISCHARGE

## 2021-12-17 RX ORDER — FOLIC ACID 0.8 MG
1 TABLET ORAL
Qty: 0 | Refills: 0 | DISCHARGE
Start: 2021-12-17

## 2021-12-17 RX ORDER — CLONAZEPAM 1 MG
1 TABLET ORAL
Qty: 0 | Refills: 0 | DISCHARGE
Start: 2021-12-17

## 2021-12-17 RX ORDER — CLONAZEPAM 1 MG
5 TABLET ORAL
Qty: 0 | Refills: 0 | DISCHARGE

## 2021-12-17 RX ORDER — SERTRALINE 25 MG/1
150 TABLET, FILM COATED ORAL
Qty: 0 | Refills: 0 | DISCHARGE

## 2021-12-17 RX ADMIN — PANTOPRAZOLE SODIUM 40 MILLIGRAM(S): 20 TABLET, DELAYED RELEASE ORAL at 06:14

## 2021-12-17 RX ADMIN — Medication 1 MILLIGRAM(S): at 06:14

## 2021-12-17 RX ADMIN — Medication 1 MILLIGRAM(S): at 13:19

## 2021-12-17 RX ADMIN — CHLORHEXIDINE GLUCONATE 1 APPLICATION(S): 213 SOLUTION TOPICAL at 10:57

## 2021-12-17 NOTE — CHART NOTE - NSCHARTNOTEFT_GEN_A_CORE
Patient seen at bedside.    Vital Signs Last 24 Hrs  T(F): 98.5 (16 Dec 2021 08:04), Max: 98.5 (16 Dec 2021 08:04)  HR: 60 (16 Dec 2021 08:04) (60 - 97)  BP: 158/80 (16 Dec 2021 08:04) (123/78 - 176/106)  RR: 20 (16 Dec 2021 08:04) (18 - 20)  SpO2: 98% (16 Dec 2021 08:04) (96% - 98%)    PHYSICAL EXAM:  GENERAL: NAD, well-groomed, well-developed  HEAD:  Atraumatic, Normocephalic  EYES: EOMI, conjunctiva and sclera clear  ENMT: Moist mucous membranes, Good dentition, no thrush  NECK: Supple, No JVD  CHEST/LUNG: Clear to auscultation bilaterally, good air entry, non-labored breathing  HEART: RRR; S1/S2, No murmur  ABDOMEN: Soft, Nontender, Nondistended; Bowel sounds present  VASCULAR: Normal pulses, Normal capillary refill  EXTREMITIES: No calf tenderness, No cyanosis, No edema  LYMPH: Normal; No lymphadenopathy noted  SKIN: Warm, Intact  PSYCH: Normal mood, Normal affect, anxious appearing  NERVOUS SYSTEM:  A/O x3, Good concentration; CN 2-12 intact    States he is in the hospital for loss of conciseness.    A/p  Psychosis r/o decompensation of bipolar disorder r/o Delirium r/o occult infection   -1:1 observation  -B12, RPR, ESR, TSH in progress   -Psych consult appreciated    Syncope  r/o arrhythmia   - patient with RBBB this is not new, was on 6/20 EKG  -admit to tele   -serial EKG and CE  -check orthostatic   -TTE    Leukocytosis - mild r/o UTI / bacteremia   -check Ucx ad Blood Cx     CKD III   - hydration     Bipolar disorder/ aggressive/threatening behavior   -Pysche eval and plan for disposition once w/u done    DVT prophylaxis .
Pt seen and examined at bedside. Case d/w [facility] legal department, since pt ok for dc by psych and there is no medical contraindications for dc then patient has no legal contraindications for dc. Pt feels well and is eager to go. I spoke with psychiatry today who believes pt's episode likely from delirium. There is no obvious cause of delirium. Pt for dc home today.    PHYSICAL EXAM:  GENERAL: NAD, speaks in full sentences, no signs of respiratory distress  HEAD:  Atraumatic, Normocephalic  EYES: Anicteric  NECK: Supple, No JVD  CHEST/LUNG: Clear to auscultation bilaterally; No wheeze; No crackles; No accessory muscles used  HEART: Regular rate and rhythm; No murmurs;   ABDOMEN: Soft, Nontender, Nondistended; Bowel sounds present; No guarding  EXTREMITIES:  2+ Peripheral Pulses, No cyanosis or edema  PSYCH: AAOx3  NEUROLOGY: non-focal  SKIN: No rashes or lesions

## 2021-12-17 NOTE — DISCHARGE NOTE PROVIDER - NSDCMRMEDTOKEN_GEN_ALL_CORE_FT
folic acid 1 mg oral tablet: 1 tab(s) orally once a day  KlonoPIN 1 mg oral tablet: 1 tab(s) orally 2 times a day  lithium 600 mg oral capsule: 1 cap(s) orally once a day (at bedtime)  Zoloft: 150 milligram(s) orally once a day

## 2021-12-17 NOTE — DISCHARGE NOTE PROVIDER - NSDCCPCAREPLAN_GEN_ALL_CORE_FT
PRINCIPAL DISCHARGE DIAGNOSIS  Diagnosis: Delirium  Assessment and Plan of Treatment: Delirium is the result of brain changes that lead to confusion, lack of focus and memory problems. There is no specific treatment for delirium it is best to avoid risks, treat underlying illnesses and receive supportive care.  Please follow up with your psych doctor out patient as scheduled      SECONDARY DISCHARGE DIAGNOSES  Diagnosis: Syncope  Assessment and Plan of Treatment: Syncope  Syncope is when you temporarily lose consciousness, also called fainting or passing out. It is caused by a sudden decrease in blood flow to the brain. Even though most causes of syncope are not dangerous, syncope can possibly be a sign of a serious medical problem. Signs that you may be about to faint include feeling dizzy, lightheaded, nausea, visual changes, or cold/clammy skin. Do not drive, operate heavy machinery, or play sports until your health care provider says it is okay.  SEEK IMMEDIATE MEDICAL CARE IF YOU HAVE ANY OF THE FOLLOWING SYMPTOMS: severe headache, pain in your chest/abdomen/back, bleeding from your mouth or rectum, palpitations, shortness of breath, pain with breathing, seizure, confusion, or trouble walking.

## 2021-12-17 NOTE — DISCHARGE NOTE NURSING/CASE MANAGEMENT/SOCIAL WORK - NSDCPEFALRISK_GEN_ALL_CORE
For information on Fall & Injury Prevention, visit: [email or web address] OR  [email or web address] OR  https://www.cdc.gov/steadi/patient.html

## 2021-12-17 NOTE — DISCHARGE NOTE PROVIDER - NSDCFUADDAPPT_GEN_ALL_CORE_FT
Reason for Visit: dysuria and wart    SUBJECTIVE:  Kitty is a 3 year old girl who presents for concern of painful urination. For the last couple days, she has been complaining that it hurts to wipe and that her tummy hurts when she goes to the bathroom. Mom thought she might be constipated so she gave her some juice. She had a bowel movement with this, but still stated that her tummy hurt. She has a bowel movement at least once per day. There was no blood in the urine or stool. She usually does her own wiping. Her urine does smell strong. She hasn't had a fever. She has never had a UTI. She has had vulvovaginitis previously.    She has also been complaining that her nose hurts. It is dry in their house and she does pick her nose. She hasn't had any nosebleeds.    She also has a small spot on her left foot. Mom thinks it is a wart. She hasn't tried anything on it. Kitty doesn't like mom to touch it.     She is otherwise well. She has no known allergies. Medications were reviewed and updated in EPIC.    OBJECTIVE:  Vitals:    02/27/17 1356   BP: 94/58   Pulse: 112   Temp: 98.4 °F (36.9 °C)   TempSrc: Axillary   Weight: 17 kg   Gen: alert, interactive, in NAD  HEENT: NCAT, conjunctiva clear, normal nose, nasal mucosa is slightly dry, TMs clear bilaterally, oropharynx clear without lesions  Resp: easy work of breathing, lungs CTA bilaterally  CV: RRR, no m/r/g  Abd: soft, nontender, nondistended, normal bowel sounds, no hepatosplenomegaly  : no discharge, there is some erythema surrounding the introitus  Skin: no rashes, there is a small plantar wart in the middle of the left foot    Labs:  UA: Spec Grav 1.020 pH 7.5 LE trace negative nitrites    ASSESSMENT:  Dysuria-likely due to vulvovaginitis  Stomach pain-may be mild constipation  Nose pain-likely from dried mucosa   Plantar wart    PLAN:   1. Discussed that her UA does not look like an infection. We will send for culture and if positive I will call  2. She does  have some irritation in the area, which is likely the source of pain. We will do vaseline on the area to help. Recommend using wipes instead of toilet paper for the next few days. Try to avoid tight clothing.  3. Can try some vaseline in the nose to help with dryness  4. Can try juice or miralax (1/4-1/2 capful) to help with constipation. Goal is a soft stool daily  5. Discussed that at her age, I would recommend over the counter treatment for the wart first because it is small and OTC treatment is less painful. Try taking the dead skin off with a nail file first and the apply. Call if not improving  6. Follow-up as needed for worsening symptoms     Domenica Sheppard MD  2/27/17   Please contact Dr. Dowd the psychiatrist and set up an appointment with him within 3 weeks.

## 2021-12-17 NOTE — DISCHARGE NOTE PROVIDER - PROVIDER TOKENS
FREE:[LAST:[Lorraine],FIRST:[Jeanette],PHONE:[(   )    -],FAX:[(   )    -]],FREE:[LAST:[Lorraine Jones],PHONE:[(   )    -],FAX:[(   )    -],ADDRESS:[[address] [place] [address]  [phone]]]

## 2021-12-17 NOTE — DISCHARGE NOTE PROVIDER - CARE PROVIDER_API CALL
Jeanette Peters  Phone: (   )    -  Fax: (   )    -  Follow Up Time:     Lorraine Jones,   [address] [facility] [place]  [phone]  Phone: (   )    -  Fax: (   )    -  Follow Up Time:

## 2021-12-17 NOTE — DISCHARGE NOTE NURSING/CASE MANAGEMENT/SOCIAL WORK - PATIENT PORTAL LINK FT
You can access the FollowMyHealth Patient Portal offered by [facility] by registering at the following website: [email or web address]. By joining [employer]’s FollowMyHealth portal, you will also be able to view your health information using other applications (apps) compatible with our system.
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
## 2021-12-19 LAB
CULTURE RESULTS: SIGNIFICANT CHANGE UP
SPECIMEN SOURCE: SIGNIFICANT CHANGE UP

## 2021-12-20 PROBLEM — Z00.00 ENCOUNTER FOR PREVENTIVE HEALTH EXAMINATION: Status: ACTIVE | Noted: 2021-12-20

## 2021-12-21 ENCOUNTER — APPOINTMENT (OUTPATIENT)
Dept: NEPHROLOGY | Facility: CLINIC | Age: 71
End: 2021-12-21

## 2021-12-21 LAB
CULTURE RESULTS: SIGNIFICANT CHANGE UP
SPECIMEN SOURCE: SIGNIFICANT CHANGE UP

## 2021-12-22 LAB
AMPHET UR-MCNC: NEGATIVE — SIGNIFICANT CHANGE UP
BARBITURATES, URINE.: NEGATIVE — SIGNIFICANT CHANGE UP
BENZODIAZ UR-MCNC: NEGATIVE — SIGNIFICANT CHANGE UP
COCAINE METAB.OTHER UR-MCNC: NEGATIVE — SIGNIFICANT CHANGE UP
CREATININE, URINE THERAPEUTIC: 125.1 MG/DL — SIGNIFICANT CHANGE UP
METHADONE UR-MCNC: NEGATIVE — SIGNIFICANT CHANGE UP
METHAQUALONE UR QL: NEGATIVE — SIGNIFICANT CHANGE UP
METHAQUALONE UR-MCNC: NEGATIVE — SIGNIFICANT CHANGE UP
OPIATES UR-MCNC: NEGATIVE — SIGNIFICANT CHANGE UP
PCP UR-MCNC: NEGATIVE — SIGNIFICANT CHANGE UP
PROPOXYPH UR QL: NEGATIVE — SIGNIFICANT CHANGE UP
THC UR QL: NEGATIVE — SIGNIFICANT CHANGE UP

## 2021-12-29 ENCOUNTER — EMERGENCY (EMERGENCY)
Facility: HOSPITAL | Age: 71
LOS: 0 days | Discharge: HOME | End: 2021-12-29
Attending: EMERGENCY MEDICINE | Admitting: EMERGENCY MEDICINE
Payer: MEDICARE

## 2021-12-29 VITALS — HEIGHT: 68 IN | WEIGHT: 195.11 LBS

## 2021-12-29 VITALS
OXYGEN SATURATION: 98 % | SYSTOLIC BLOOD PRESSURE: 177 MMHG | HEART RATE: 73 BPM | TEMPERATURE: 98 F | DIASTOLIC BLOOD PRESSURE: 96 MMHG | RESPIRATION RATE: 18 BRPM

## 2021-12-29 DIAGNOSIS — F31.9 BIPOLAR DISORDER, UNSPECIFIED: ICD-10-CM

## 2021-12-29 DIAGNOSIS — F41.1 GENERALIZED ANXIETY DISORDER: ICD-10-CM

## 2021-12-29 DIAGNOSIS — Z88.0 ALLERGY STATUS TO PENICILLIN: ICD-10-CM

## 2021-12-29 DIAGNOSIS — R25.1 TREMOR, UNSPECIFIED: ICD-10-CM

## 2021-12-29 DIAGNOSIS — F43.10 POST-TRAUMATIC STRESS DISORDER, UNSPECIFIED: ICD-10-CM

## 2021-12-29 DIAGNOSIS — Z20.822 CONTACT WITH AND (SUSPECTED) EXPOSURE TO COVID-19: ICD-10-CM

## 2021-12-29 PROBLEM — F41.9 ANXIETY DISORDER, UNSPECIFIED: Chronic | Status: ACTIVE | Noted: 2021-12-16

## 2021-12-29 PROBLEM — F31.70 BIPOLAR DISORDER, CURRENTLY IN REMISSION, MOST RECENT EPISODE UNSPECIFIED: Chronic | Status: ACTIVE | Noted: 2021-12-16

## 2021-12-29 PROBLEM — N18.30 CHRONIC KIDNEY DISEASE, STAGE 3 UNSPECIFIED: Chronic | Status: ACTIVE | Noted: 2021-12-16

## 2021-12-29 LAB
ANION GAP SERPL CALC-SCNC: 12 MMOL/L — SIGNIFICANT CHANGE UP (ref 7–14)
APAP SERPL-MCNC: <5 UG/ML — LOW (ref 10–30)
BASE EXCESS BLDV CALC-SCNC: -2.1 MMOL/L — LOW (ref -2–3)
BASOPHILS # BLD AUTO: 0.06 K/UL — SIGNIFICANT CHANGE UP (ref 0–0.2)
BASOPHILS NFR BLD AUTO: 0.6 % — SIGNIFICANT CHANGE UP (ref 0–1)
BUN SERPL-MCNC: 20 MG/DL — SIGNIFICANT CHANGE UP (ref 10–20)
CA-I SERPL-SCNC: 1.37 MMOL/L — HIGH (ref 1.15–1.33)
CALCIUM SERPL-MCNC: 10.3 MG/DL — HIGH (ref 8.5–10.1)
CHLORIDE SERPL-SCNC: 108 MMOL/L — SIGNIFICANT CHANGE UP (ref 98–110)
CO2 SERPL-SCNC: 20 MMOL/L — SIGNIFICANT CHANGE UP (ref 17–32)
CREAT SERPL-MCNC: 1.4 MG/DL — SIGNIFICANT CHANGE UP (ref 0.7–1.5)
EOSINOPHIL # BLD AUTO: 0.39 K/UL — SIGNIFICANT CHANGE UP (ref 0–0.7)
EOSINOPHIL NFR BLD AUTO: 3.9 % — SIGNIFICANT CHANGE UP (ref 0–8)
ETHANOL SERPL-MCNC: <10 MG/DL — SIGNIFICANT CHANGE UP
GAS PNL BLDV: 139 MMOL/L — SIGNIFICANT CHANGE UP (ref 136–145)
GAS PNL BLDV: SIGNIFICANT CHANGE UP
GLUCOSE SERPL-MCNC: 133 MG/DL — HIGH (ref 70–99)
HCO3 BLDV-SCNC: 22 MMOL/L — SIGNIFICANT CHANGE UP (ref 22–29)
HCT VFR BLD CALC: 46 % — SIGNIFICANT CHANGE UP (ref 42–52)
HCT VFR BLDA CALC: 51 % — SIGNIFICANT CHANGE UP (ref 39–51)
HGB BLD CALC-MCNC: 16.9 G/DL — SIGNIFICANT CHANGE UP (ref 12.6–17.4)
HGB BLD-MCNC: 16.4 G/DL — SIGNIFICANT CHANGE UP (ref 14–18)
IMM GRANULOCYTES NFR BLD AUTO: 0.3 % — SIGNIFICANT CHANGE UP (ref 0.1–0.3)
LACTATE BLDV-MCNC: 1.7 MMOL/L — SIGNIFICANT CHANGE UP (ref 0.5–2)
LITHIUM SERPL-MCNC: 1.1 MMOL/L — SIGNIFICANT CHANGE UP (ref 0.6–1.2)
LYMPHOCYTES # BLD AUTO: 3.33 K/UL — SIGNIFICANT CHANGE UP (ref 1.2–3.4)
LYMPHOCYTES # BLD AUTO: 33.1 % — SIGNIFICANT CHANGE UP (ref 20.5–51.1)
MAGNESIUM SERPL-MCNC: 2 MG/DL — SIGNIFICANT CHANGE UP (ref 1.8–2.4)
MCHC RBC-ENTMCNC: 34.1 PG — HIGH (ref 27–31)
MCHC RBC-ENTMCNC: 35.7 G/DL — SIGNIFICANT CHANGE UP (ref 32–37)
MCV RBC AUTO: 95.6 FL — HIGH (ref 80–94)
MONOCYTES # BLD AUTO: 0.43 K/UL — SIGNIFICANT CHANGE UP (ref 0.1–0.6)
MONOCYTES NFR BLD AUTO: 4.3 % — SIGNIFICANT CHANGE UP (ref 1.7–9.3)
NEUTROPHILS # BLD AUTO: 5.81 K/UL — SIGNIFICANT CHANGE UP (ref 1.4–6.5)
NEUTROPHILS NFR BLD AUTO: 57.8 % — SIGNIFICANT CHANGE UP (ref 42.2–75.2)
NRBC # BLD: 0 /100 WBCS — SIGNIFICANT CHANGE UP (ref 0–0)
PCO2 BLDV: 37 MMHG — LOW (ref 42–55)
PH BLDV: 7.39 — SIGNIFICANT CHANGE UP (ref 7.32–7.43)
PLATELET # BLD AUTO: 193 K/UL — SIGNIFICANT CHANGE UP (ref 130–400)
PO2 BLDV: 47 MMHG — SIGNIFICANT CHANGE UP
POTASSIUM BLDV-SCNC: 3.5 MMOL/L — SIGNIFICANT CHANGE UP (ref 3.5–5.1)
POTASSIUM SERPL-MCNC: 3.7 MMOL/L — SIGNIFICANT CHANGE UP (ref 3.5–5)
POTASSIUM SERPL-SCNC: 3.7 MMOL/L — SIGNIFICANT CHANGE UP (ref 3.5–5)
RBC # BLD: 4.81 M/UL — SIGNIFICANT CHANGE UP (ref 4.7–6.1)
RBC # FLD: 12.8 % — SIGNIFICANT CHANGE UP (ref 11.5–14.5)
SALICYLATES SERPL-MCNC: <0.3 MG/DL — LOW (ref 4–30)
SAO2 % BLDV: 84 % — SIGNIFICANT CHANGE UP
SODIUM SERPL-SCNC: 140 MMOL/L — SIGNIFICANT CHANGE UP (ref 135–146)
WBC # BLD: 10.05 K/UL — SIGNIFICANT CHANGE UP (ref 4.8–10.8)
WBC # FLD AUTO: 10.05 K/UL — SIGNIFICANT CHANGE UP (ref 4.8–10.8)

## 2021-12-29 PROCEDURE — 93010 ELECTROCARDIOGRAM REPORT: CPT

## 2021-12-29 PROCEDURE — 99284 EMERGENCY DEPT VISIT MOD MDM: CPT

## 2021-12-29 RX ORDER — FOLIC ACID 0.8 MG
1 TABLET ORAL ONCE
Refills: 0 | Status: COMPLETED | OUTPATIENT
Start: 2021-12-29 | End: 2021-12-29

## 2021-12-29 RX ORDER — THIAMINE MONONITRATE (VIT B1) 100 MG
100 TABLET ORAL ONCE
Refills: 0 | Status: COMPLETED | OUTPATIENT
Start: 2021-12-29 | End: 2021-12-29

## 2021-12-29 RX ADMIN — Medication 50 MILLIGRAM(S): at 10:16

## 2021-12-29 RX ADMIN — Medication 100 MILLIGRAM(S): at 10:16

## 2021-12-29 RX ADMIN — Medication 1 MILLIGRAM(S): at 10:16

## 2021-12-29 NOTE — ED ADULT NURSE NOTE - PATIENT'S CHIEF COMPLAINT
Pt states his PTSD is "flaring up" causing tremors and anxiety. Patient states he does not drink or use drugs.

## 2021-12-29 NOTE — ED PROVIDER NOTE - NS ED ROS FT
Constitutional: (-) fever, (-) chills  Eyes: (-) visual changes  ENT: (-) nasal congestions  Cardiovascular: (-) chest pain, (-) syncope  Respiratory: (-) cough, (-) shortness of breath, (-) dyspnea,   Gastrointestinal: (-) vomiting, (-) diarrhea, (-)nausea,  Musculoskeletal: (-) neck pain, (-) back pain, (-) joint pain,  Integumentary: (-) rash, (-) edema, (-) bruises  Neurological: (-) headache, (-) loc, (-) dizziness, (-) tingling, (-)numbness, (+) tremor   Peripheral Vascular: (-) leg swelling  :  (-)dysuria,  (-) hematuria  Allergic/Immunologic: (-) pruritus

## 2021-12-29 NOTE — ED PROVIDER NOTE - PATIENT PORTAL LINK FT
You can access the FollowMyHealth Patient Portal offered by [facility] by registering at the following website: [email or web address]. By joining [employer]’s FollowMyHealth portal, you will also be able to view your health information using other applications (apps) compatible with our system.

## 2021-12-29 NOTE — ED PROVIDER NOTE - CARE PROVIDER_API CALL
Arden Mckenzie)  EEGEpilepsy; Neurology  [address] [place] [address], [address]  [place] [address]  Phone: [phone]  Fax: [phone]  Follow Up Time: 1-3 Days

## 2021-12-29 NOTE — ED PROVIDER NOTE - NSFOLLOWUPINSTRUCTIONS_ED_ALL_ED_FT
Essential Tremor    AMBULATORY CARE:    An essential tremor is uncontrolled muscle shaking that has no known cause. Your healthcare provider can diagnose essential tremor based on your signs and symptoms. Other tests may be used to make sure another condition is not causing the tremors. Essential tremors can happen at any age but are most common after 40 years of age.    Common signs and symptoms of essential tremor: Signs and symptoms may be mild or severe. You may have tremors when you try to hold still or when you move. Any of the following may get worse slowly, over time:   •Hands or arms shake, especially when you hold or reach for objects      •Voice quivers when you speak      •Legs shake when you stand still      •Trouble controlling your hands or arms, holding objects, or writing      •Trouble doing daily activities such as brushing your teeth or shaving      •Head nodding or shaking you cannot control      •Shaking brought on or made worse by stress, fatigue, or chemicals such as caffeine      •A feeling of shaking or trembling inside, even if your body does not shake      Contact your healthcare provider if:   •You have new or worsening symptoms.      •You have questions or concerns about your condition or care.      Treatment may not be needed. If your tremors are severe or keep you from doing daily activities, the following may help:   •Medicines may be given to control tremors so you can do your normal activities more easily. Medicines will not completely control or stop the tremors, but they may help.       •Deep brain stimulation is a procedure used if other treatments do not control your tremors. Electric stimulation is given to parts of the brain that control movement. The stimulation stops those parts of the brain from working.       •Surgery may be used if your tremors are severe and medicines have not helped. Surgery may be used to destroy part of your thalamus. The thalamus is a part of the brain that control movement and coordination.      Manage your symptoms:   •Go to occupational therapy as directed. An occupational therapist can help you find new ways to do your daily activities. For example, you may learn to use wrist weights during activities such as brushing your teeth. The weights can help steady your arms and hands.      •Use assistive devices as directed. Weighted utensils can help you control your movements as you eat. Adaptive equipment, such as a trackball mouse, for the computer can help make computers easier to use. It may also help to install voice recognition software on your computer. The software allows you to talk instead of using the keyboard to type. Electric appliances such as can openers and toothbrushes can make daily living activities easier. Ask your healthcare provider or occupational therapist for more information on assistive devices.      •Wear clothing that is easy to put on and take off. Examples include shoes that do not need laces and shirts without buttons. You can also get a device to help you get buttons through the button holes.      •Do not have caffeine. Caffeine can make tremors worse.       •Do not smoke. Nicotine and other chemicals in cigarettes and cigars can make tremors worse. Ask your healthcare provider for information if you currently smoke and need help to quit. E-cigarettes or smokeless tobacco still contain nicotine. Talk to your healthcare provider before you use these products.       •Manage stress. Stress can trigger tremors or make them worse. Find ways to manage stress, such as deep breathing, listening to music, or getting a massage. Talk to your healthcare provider if you need help to control anxiety.      •Ask about medicines. Some medicines can make tremors worse. An example is cold medicines. Talk to your healthcare provider about all the medicines you take.      •Keep a record of your tremors. Include when the tremors happened, and how long they lasted. List anything you think might have triggered the tremors or made them stop. It might be helpful to include any foods or drinks you had that contained caffeine or were unusual for you. Bring the record with you to your follow-up appointments.      Follow up with your healthcare provider as directed: You may be referred to a neurologist (nerve specialist). Write down your questions so you remember to ask them during your visits.

## 2021-12-29 NOTE — ED PROVIDER NOTE - PROGRESS NOTE DETAILS
pt sxs improved, feels better, denies si/hi, sxs that should prompt return explained to pt, verbalizes understanding. I personally evaluated the patient. I reviewed the Resident’s or Physician Assistant’s note (as assigned above), and agree with the findings and plan except as documented in my note.  70 y/o M with PHMx PTSD and anxiety presents with mild tremulousness since yesterday. Pt denies pain, fever, chills, CP, SOB, n/v/d, numbness, weakness and tingling. Pt states he has had similar sxs in the past that did not require intervention. On exam: Pt with mild tremors. NCAT. PERRLA, EOMI. OP clear. Lungs CTAB. RRR, S1S2 noted. Radial pulses 2+ and equal, pedal pulses 2+ and equal. Abdomen soft, NT/ND, no rebound or guarding. FROM x4 extremities. No focal neuro deficits. A/P: will obtain labs and EKG. Pt treated with librium, folic acid and vitamin B. Pt improved in the ED. Will d/c.

## 2021-12-29 NOTE — ED PROVIDER NOTE - CLINICAL SUMMARY MEDICAL DECISION MAKING FREE TEXT BOX
patient presents for evaluation of tremors he notes that it is worse with his anxiety which is worse over the past several hours after finding out he is getting evicted from his apartment.  On exam he is able to ambulate he is able to move all extremities he denies any cp, sob diaphoresis vomiting.  we obtained ekg labs   patient improved able to discharge at this time

## 2021-12-29 NOTE — ED PROVIDER NOTE - OBJECTIVE STATEMENT
70 yo male, pmh of ptsd, char, presents to ed for tremor. pt states triggered by his ptsd, has had this in past, takes lithium for bipolar d/o, no complaints of pain or radiation. denies si/hi, cp, sob, abd pain, nvd. socially drinks a few beers per day.

## 2021-12-29 NOTE — ED PROVIDER NOTE - PHYSICAL EXAMINATION
Physical Exam    Vital Signs: I have reviewed the initial vital signs.  Constitutional: well-nourished, appears stated age, no acute distress  Eyes: Conjunctiva pink, Sclera clear, PERRLA, EOMI.  ENT: OP is clear without exudates, normal dentition, normal gingival, tongue without swelling, TMs clear b/l, nasal turbinates without erythema or discharge, no lymphadenopathy.  Cardiovascular: S1 and S2, regular rate, regular rhythm, well-perfused extremities, radial pulses equal and 2+  Respiratory: unlabored respiratory effort, clear to auscultation bilaterally no wheezing, rales and rhonchi  Gastrointestinal: soft, non-tender abdomen, no pulsatile mass, normal bowl sounds  Musculoskeletal: supple neck, no lower extremity edema, no midline tenderness  Integumentary: warm, dry, no rash  Neurologic: awake, alert, cranial nerves II-XII grossly intact, extremities’ motor and sensory functions grossly intact, + tremor   Psychiatric: appropriate mood, appropriate affect

## 2021-12-29 NOTE — ED PROVIDER NOTE - ATTENDING CONTRIBUTION TO CARE
I was present for and supervised the key and critical aspects of the procedures performed during the care of the patient. I personally evaluated the patient. I reviewed the Resident’s or Physician Assistant’s note (as assigned above), and agree with the findings and plan except as documented in my note.  70 y/o M with PHMx PTSD and anxiety presents with mild tremulousness since yesterday. Pt denies pain, fever, chills, CP, SOB, n/v/d, numbness, weakness and tingling. Pt states he has had similar sxs in the past that did not require intervention. On exam: Pt with mild tremors. NCAT. PERRLA, EOMI. OP clear. Lungs CTAB. RRR, S1S2 noted. Radial pulses 2+ and equal, pedal pulses 2+ and equal. Abdomen soft, NT/ND, no rebound or guarding. FROM x4 extremities. No focal neuro deficits. A/P: will obtain labs and EKG. Pt treated with librium, folic acid and vitamin B. Pt improved in the ED. Will d/c.

## 2022-01-17 NOTE — ED PROVIDER NOTE - CCCP TRG CHIEF CMPLNT
psychiatric evaluation Burow's Graft Text: The defect edges were debeveled with a #15 scalpel blade.  Given the location of the defect, shape of the defect, the proximity to free margins and the presence of a standing cone deformity a Burow's skin graft was deemed most appropriate. The standing cone was removed and this tissue was then trimmed to the shape of the primary defect. The adipose tissue was also removed until only dermis and epidermis were left.  The skin margins of the secondary defect were undermined to an appropriate distance in all directions utilizing iris scissors.  The secondary defect was closed with interrupted buried subcutaneous sutures.  The skin edges were then re-apposed with running  sutures.  The skin graft was then placed in the primary defect and oriented appropriately.

## 2022-06-15 ENCOUNTER — EMERGENCY (EMERGENCY)
Facility: HOSPITAL | Age: 72
LOS: 0 days | Discharge: LEFT BEFORE DISCHARGE | End: 2022-06-15
Attending: EMERGENCY MEDICINE | Admitting: EMERGENCY MEDICINE
Payer: MEDICARE

## 2022-06-15 VITALS
RESPIRATION RATE: 18 BRPM | SYSTOLIC BLOOD PRESSURE: 137 MMHG | WEIGHT: 169.98 LBS | DIASTOLIC BLOOD PRESSURE: 95 MMHG | HEART RATE: 83 BPM | OXYGEN SATURATION: 99 % | TEMPERATURE: 97 F | HEIGHT: 68 IN

## 2022-06-15 DIAGNOSIS — H92.09 OTALGIA, UNSPECIFIED EAR: ICD-10-CM

## 2022-06-15 DIAGNOSIS — Z53.21 PROCEDURE AND TREATMENT NOT CARRIED OUT DUE TO PATIENT LEAVING PRIOR TO BEING SEEN BY HEALTH CARE PROVIDER: ICD-10-CM

## 2022-06-15 PROCEDURE — L9991: CPT

## 2022-08-02 ENCOUNTER — EMERGENCY (EMERGENCY)
Facility: HOSPITAL | Age: 72
LOS: 0 days | Discharge: HOME | End: 2022-08-02
Attending: EMERGENCY MEDICINE | Admitting: EMERGENCY MEDICINE

## 2022-08-02 VITALS
SYSTOLIC BLOOD PRESSURE: 185 MMHG | RESPIRATION RATE: 20 BRPM | OXYGEN SATURATION: 98 % | WEIGHT: 169.98 LBS | TEMPERATURE: 99 F | HEART RATE: 111 BPM | DIASTOLIC BLOOD PRESSURE: 94 MMHG | HEIGHT: 68 IN

## 2022-08-02 VITALS
OXYGEN SATURATION: 99 % | TEMPERATURE: 98 F | RESPIRATION RATE: 18 BRPM | DIASTOLIC BLOOD PRESSURE: 97 MMHG | SYSTOLIC BLOOD PRESSURE: 125 MMHG | HEART RATE: 81 BPM

## 2022-08-02 DIAGNOSIS — Z76.0 ENCOUNTER FOR ISSUE OF REPEAT PRESCRIPTION: ICD-10-CM

## 2022-08-02 DIAGNOSIS — F43.10 POST-TRAUMATIC STRESS DISORDER, UNSPECIFIED: ICD-10-CM

## 2022-08-02 DIAGNOSIS — Z88.0 ALLERGY STATUS TO PENICILLIN: ICD-10-CM

## 2022-08-02 DIAGNOSIS — F41.9 ANXIETY DISORDER, UNSPECIFIED: ICD-10-CM

## 2022-08-02 DIAGNOSIS — F31.9 BIPOLAR DISORDER, UNSPECIFIED: ICD-10-CM

## 2022-08-02 PROCEDURE — 99283 EMERGENCY DEPT VISIT LOW MDM: CPT | Mod: GC

## 2022-08-02 RX ORDER — CLONAZEPAM 1 MG
1 TABLET ORAL ONCE
Refills: 0 | Status: DISCONTINUED | OUTPATIENT
Start: 2022-08-02 | End: 2022-08-02

## 2022-08-02 RX ORDER — LITHIUM CARBONATE 300 MG/1
2 TABLET, EXTENDED RELEASE ORAL
Qty: 14 | Refills: 0
Start: 2022-08-02 | End: 2022-08-08

## 2022-08-02 RX ADMIN — Medication 1 MILLIGRAM(S): at 09:40

## 2022-08-02 NOTE — ED PROVIDER NOTE - NS ED ROS FT
GEN: no generalized weakness  NEURO:  no headache, no dizziness  CV:  no chest pain, no palpitations  RESP:  no sob, no cough  GI:  no nausea, no vomiting  MSK:  no joint pain  SKIN:  no rash, no bruising  PSYCH:  no homicidal ideation, no suicidal ideation, no visual hallucinations, no auditory hallucinations

## 2022-08-02 NOTE — ED PROVIDER NOTE - IV ALTEPLASE EXCL REL HIDDEN
[address] [place] [address] Patient: Jerson Boyd MRN: [id number] BJQ:[birth year] A check slava indicates which time of day the medication should be taken. My Medications ASK your doctor about these medications Instructions Each Dose to Equal  
 Morning Noon Evening Bedtime PNV66-Iron Fumarate-FA-DSS-DHA 26-1.2- mg Cap Your last dose was: Your next dose is: Take 1 Tab by mouth. Indications: pregnancy 1 Tab show

## 2022-08-02 NOTE — ED PROVIDER NOTE - OBJECTIVE STATEMENT
73 yo male, PMHx bipolar disorder, anxiety, PTSD, presents for medication refill. Patient states his psychiatrist went on vacation and did not send refills for his medication and he ran out of lithium (300 mg 2 tablets at bedtime) and clonazepam (1mg tablet twice daily) 7 days ago. He states he feels his "brain is thinking differently." Denies chest pain, shortness of breath, headache, dizziness, SI/HI/AVH, nausea, vomiting.

## 2022-08-02 NOTE — ED PROVIDER NOTE - CLINICAL SUMMARY MEDICAL DECISION MAKING FREE TEXT BOX
Patient here for medication refill will prescribe lithium.  I informed the patient that we are not able to prescribe clonazepam as a prescription and will give one-time dose here.  Explained the patient may go into withdrawal and she returned with symptoms present.  Patient initially is tremulous but is improving now repeat heart rate improved patient stable for discharge.

## 2022-08-02 NOTE — ED PROVIDER NOTE - NSFOLLOWUPINSTRUCTIONS_ED_ALL_ED_FT
Benzodiazepine Withdrawal      Benzodiazepines are prescription medicines that decrease the activity of (depress) the central nervous system and cause changes in certain brain chemicals (neurotransmitters). There are many types of benzodiazepines. Some benzodiazepines take effect quickly and stay in your body for a short amount of time (short-acting). Other benzodiazepines require more time to take effect and stay in your body for longer amounts of time (long-acting). The five most commonly prescribed benzodiazepines are:  •Alprazolam.      •Lorazepam.      •Clonazepam.      •Diazepam.      •Temazepam.      Withdrawal is a group of physical and mental symptoms that can happen when you suddenly stop taking a medicine.      What are the causes?    When you take benzodiazepines, your brain needs more and more of the medicine over time to get the same effects from it. This increased need is called tolerance. As you develop a tolerance, your brain adapts to the effects of the benzodiazepine and relies on these effects. This is called dependency. Withdrawal happens when you suddenly stop taking your medicine. This does not give your brain enough time to adapt to not having the medicine.      What increases the risk?    You are more likely to develop this condition if you:  •Have taken benzodiazepines for more than 1–2 weeks.      •Have developed a tolerance for benzodiazepines.      •Have developed a dependence on benzodiazepines.      •Take high dosages of benzodiazepines.      •Take doses of benzodiazepines that are higher than prescribed.      •Take benzodiazepines without a prescription.      •Use benzodiazepines with other substances that depress the central nervous system, such as alcohol.      •Have a history of drug or alcohol abuse.        What are the signs or symptoms?    Symptoms of this condition may include:  •Difficulty sleeping.      •Anxiety.      •Restlessness.      •Irritability.      •Muscle aches.      •Involuntary shaking or trembling of a body part (tremor).      •Confusion and poor concentration.      •Vomiting.      •Sweating.      •Headaches.      •Feeling or seeing things that are not there (hallucinations).      •Seizures.      Symptoms of withdrawal from short-acting benzodiazepines may develop 1–2 days after you stop taking your medicine, and they may last for 2–4 weeks or longer.    Symptoms of withdrawal from long-acting benzodiazepines may develop 2–7 days after you stop taking your medicine, and they may last for 2–8 weeks or longer.      How is this diagnosed?    This condition may be diagnosed based on:  •Your symptoms.    •A physical exam. Your health care provider may check for:  •Rapid heartbeat.      •Rapid breathing.      •Tremors.      •High blood pressure.        •Blood tests.      •Urine tests.      •Your alcohol and drug habits.      •Your medical history.        How is this treated?    Treatment usually involves starting you on a safe and stable dose of a benzodiazepine and then slowly lowering your dosage over time (tapered withdrawal). This may be done at a hospital or a treatment center. Treatment for this condition depends on:  •Your symptoms.      •The type of benzodiazepine you have been taking.      •How long you have been taking benzodiazepines.      Long-term treatment for this condition may involve medicine, counseling, and support groups.      Follow these instructions at home:     •Take over-the-counter and prescription medicines only as told by your health care provider.      •Check with your health care provider before starting new medicines.      •Keep all follow-up visits as told by your health care provider. This is important.        How is this prevented?    • Do not take any benzodiazepines without a prescription.      • Do not take more than your prescribed dosage.      • Do not mix benzodiazepines with alcohol or other medicines.      • Do not stop taking benzodiazepines without speaking with your health care provider.        Contact a health care provider if you:    •Are not able to take your medicines as told by your health care provider.      •Have symptoms that get worse.      •Develop withdrawal symptoms during your tapered withdrawal.      •Develop a craving for drugs or alcohol.      •Experience withdrawal again (relapse).        Get help right away if you:    •Have a seizure.      •Become very confused.      •Lose consciousness.      •Have difficulty breathing.      •Have serious thoughts about hurting yourself or someone else.      If you ever feel like you may hurt yourself or others, or have thoughts about taking your own life, get help right away. You can go to your nearest emergency department or call:   • Your local emergency services (911 in the [place]       • A suicide crisis helpline, such as the National Suicide Prevention Lifeline at 1-829.859.3434. This is open 24 hours a day.         Summary    •Withdrawal is a group of physical and mental symptoms that can happen when you suddenly stop taking a benzodiazepine.      •Treatment usually involves starting on a safe and stable dose of a benzodiazepine and then slowly lowering the dosage over time.      •The type of treatment depends on your symptoms, how long you were taking the medicine, and which type of medicine you were taking.      •Long-term treatment may include medicine, counseling, and support groups.      This information is not intended to replace advice given to you by your health care provider. Make sure you discuss any questions you have with your health care provider.

## 2022-08-02 NOTE — ED PROVIDER NOTE - NSFOLLOWUPCLINICS_GEN_ALL_ED_FT
[facility]  [facility]  [address] [place] [id number]  Phone: [phone]  Fax:   Follow Up Time: 4-6 Days     [facility]  [facility]  [address] [place] [address]  Phone: [phone]  Fax:   Follow Up Time: 4-6 Days

## 2022-08-02 NOTE — ED PROVIDER NOTE - ATTENDING CONTRIBUTION TO CARE
I reviewed and verified the documentation and independently performed the documented MDM      Impression  Patient here for medication refill will prescribe lithium.  I informed the patient that we are not able to prescribe clonazepam as a prescription and will give one-time dose here.  Explained the patient may go into withdrawal and she returned with symptoms present.  Patient initially is tremulous but is improving now repeat heart rate improved patient stable for discharge.

## 2022-08-02 NOTE — ED PROVIDER NOTE - PATIENT PORTAL LINK FT
You can access the FollowMyHealth Patient Portal offered by [facility] by registering at the following website: [email or web address]. By joining [employer]’s FollowMyHealth portal, you will also be able to view your health information using other applications (apps) compatible with our system. You can access the FollowMyHealth Patient Portal offered by [facility] by registering at the following website: [email or web address]. By joining [employer]’s FollowMyHealth portal, you will also be able to view your health information using other applications (apps) compatible with our system.

## 2022-08-02 NOTE — ED ADULT NURSE NOTE - CHIEF COMPLAINT QUOTE
As per patient "I need a refill of my lithium 300 mg (take two tabs at bedtime and clonazepam 1 mg twice a day. Dr. Dowd forgot to call them in, I haven't taken it in a week". Denies wanting to harm self, denies wanting to harm others. Pharmacy is [facility] on [address]

## 2022-08-02 NOTE — ED PROVIDER NOTE - PHYSICAL EXAMINATION
CONSTITUTIONAL: Well-developed; well-nourished; in no acute distress.   SKIN: warm, dry  EYES: no conjunctival injection  ENT: No nasal discharge  NECK: Supple  CARD: S1, S2 normal; Regular rate and rhythm.   RESP: No wheezes, rales or rhonchi.  EXT: Normal ROM.  No clubbing, cyanosis or edema.   NEURO: Alert, oriented, grossly unremarkable. +Mild hand tremors at rest.  PSYCH: Cooperative, appropriate.

## 2022-08-02 NOTE — ED ADULT TRIAGE NOTE - CHIEF COMPLAINT QUOTE
As per patient "I need a refill of my lithium 300 mg (take two tabs at bedtime and clonazepam 1 mg twice a day. Dr. Dowd forgot to call them in, I haven't taken it in a week". Denies wanting to harm self, denies wanting to harm others. Pharmacy is [facility] on [address]

## 2022-08-02 NOTE — ED PROVIDER NOTE - PROGRESS NOTE DETAILS
CP: Given patient strict return precautions especially regarding benzodiazepine withdrawal symptoms, and follow up with psych. Patient verbalized understanding and is agreeable to plan. CP: Patient's tremors significantly improved. Patient was able to call [facility]D and obtain clonazepam refill.

## 2022-10-01 ENCOUNTER — INPATIENT (INPATIENT)
Facility: HOSPITAL | Age: 72
LOS: 2 days | Discharge: HOME | End: 2022-10-04
Attending: INTERNAL MEDICINE | Admitting: INTERNAL MEDICINE
Payer: MEDICARE

## 2022-10-01 VITALS
TEMPERATURE: 98 F | OXYGEN SATURATION: 100 % | HEIGHT: 68 IN | WEIGHT: 164.91 LBS | RESPIRATION RATE: 18 BRPM | DIASTOLIC BLOOD PRESSURE: 86 MMHG | HEART RATE: 83 BPM | SYSTOLIC BLOOD PRESSURE: 140 MMHG

## 2022-10-01 LAB
ALBUMIN SERPL ELPH-MCNC: 4.4 G/DL — SIGNIFICANT CHANGE UP (ref 3.5–5.2)
ALP SERPL-CCNC: 92 U/L — SIGNIFICANT CHANGE UP (ref 30–115)
ALT FLD-CCNC: 15 U/L — SIGNIFICANT CHANGE UP (ref 0–41)
AMPHET UR-MCNC: NEGATIVE — SIGNIFICANT CHANGE UP
ANION GAP SERPL CALC-SCNC: 13 MMOL/L — SIGNIFICANT CHANGE UP (ref 7–14)
APTT BLD: 33.3 SEC — SIGNIFICANT CHANGE UP (ref 27–39.2)
AST SERPL-CCNC: 33 U/L — SIGNIFICANT CHANGE UP (ref 0–41)
BARBITURATES UR SCN-MCNC: NEGATIVE — SIGNIFICANT CHANGE UP
BASOPHILS # BLD AUTO: 0.07 K/UL — SIGNIFICANT CHANGE UP (ref 0–0.2)
BASOPHILS NFR BLD AUTO: 0.6 % — SIGNIFICANT CHANGE UP (ref 0–1)
BENZODIAZ UR-MCNC: NEGATIVE — SIGNIFICANT CHANGE UP
BILIRUB SERPL-MCNC: 0.5 MG/DL — SIGNIFICANT CHANGE UP (ref 0.2–1.2)
BUN SERPL-MCNC: 17 MG/DL — SIGNIFICANT CHANGE UP (ref 10–20)
CALCIUM SERPL-MCNC: 10.4 MG/DL — SIGNIFICANT CHANGE UP (ref 8.4–10.5)
CHLORIDE SERPL-SCNC: 106 MMOL/L — SIGNIFICANT CHANGE UP (ref 98–110)
CK MB CFR SERPL CALC: 11.2 NG/ML — HIGH (ref 0.6–6.3)
CO2 SERPL-SCNC: 22 MMOL/L — SIGNIFICANT CHANGE UP (ref 17–32)
COCAINE METAB.OTHER UR-MCNC: NEGATIVE — SIGNIFICANT CHANGE UP
CREAT SERPL-MCNC: 1.2 MG/DL — SIGNIFICANT CHANGE UP (ref 0.7–1.5)
D DIMER BLD IA.RAPID-MCNC: 219 NG/ML DDU — SIGNIFICANT CHANGE UP (ref 0–230)
DRUG SCREEN 1, URINE RESULT: SIGNIFICANT CHANGE UP
EGFR: 64 ML/MIN/1.73M2 — SIGNIFICANT CHANGE UP
EOSINOPHIL # BLD AUTO: 0.44 K/UL — SIGNIFICANT CHANGE UP (ref 0–0.7)
EOSINOPHIL NFR BLD AUTO: 4 % — SIGNIFICANT CHANGE UP (ref 0–8)
FENTANYL UR QL: NEGATIVE — SIGNIFICANT CHANGE UP
GLUCOSE SERPL-MCNC: 99 MG/DL — SIGNIFICANT CHANGE UP (ref 70–99)
HCT VFR BLD CALC: 47.5 % — SIGNIFICANT CHANGE UP (ref 42–52)
HGB BLD-MCNC: 16.6 G/DL — SIGNIFICANT CHANGE UP (ref 14–18)
IMM GRANULOCYTES NFR BLD AUTO: 0.3 % — SIGNIFICANT CHANGE UP (ref 0.1–0.3)
INR BLD: 1.01 RATIO — SIGNIFICANT CHANGE UP (ref 0.65–1.3)
LITHIUM SERPL-MCNC: 0.7 MMOL/L — SIGNIFICANT CHANGE UP (ref 0.6–1.2)
LYMPHOCYTES # BLD AUTO: 28.8 % — SIGNIFICANT CHANGE UP (ref 20.5–51.1)
LYMPHOCYTES # BLD AUTO: 3.14 K/UL — SIGNIFICANT CHANGE UP (ref 1.2–3.4)
MCHC RBC-ENTMCNC: 33.6 PG — HIGH (ref 27–31)
MCHC RBC-ENTMCNC: 34.9 G/DL — SIGNIFICANT CHANGE UP (ref 32–37)
MCV RBC AUTO: 96.2 FL — HIGH (ref 80–94)
METHADONE UR-MCNC: NEGATIVE — SIGNIFICANT CHANGE UP
MONOCYTES # BLD AUTO: 0.56 K/UL — SIGNIFICANT CHANGE UP (ref 0.1–0.6)
MONOCYTES NFR BLD AUTO: 5.1 % — SIGNIFICANT CHANGE UP (ref 1.7–9.3)
NEUTROPHILS # BLD AUTO: 6.67 K/UL — HIGH (ref 1.4–6.5)
NEUTROPHILS NFR BLD AUTO: 61.2 % — SIGNIFICANT CHANGE UP (ref 42.2–75.2)
NRBC # BLD: 0 /100 WBCS — SIGNIFICANT CHANGE UP (ref 0–0)
OPIATES UR-MCNC: NEGATIVE — SIGNIFICANT CHANGE UP
OXYCODONE UR-MCNC: NEGATIVE — SIGNIFICANT CHANGE UP
PCP UR-MCNC: NEGATIVE — SIGNIFICANT CHANGE UP
PLATELET # BLD AUTO: 213 K/UL — SIGNIFICANT CHANGE UP (ref 130–400)
POTASSIUM SERPL-MCNC: 4.4 MMOL/L — SIGNIFICANT CHANGE UP (ref 3.5–5)
POTASSIUM SERPL-SCNC: 4.4 MMOL/L — SIGNIFICANT CHANGE UP (ref 3.5–5)
PROPOXYPHENE QUALITATIVE URINE RESULT: NEGATIVE — SIGNIFICANT CHANGE UP
PROT SERPL-MCNC: 6.8 G/DL — SIGNIFICANT CHANGE UP (ref 6–8)
PROTHROM AB SERPL-ACNC: 11.6 SEC — SIGNIFICANT CHANGE UP (ref 9.95–12.87)
RBC # BLD: 4.94 M/UL — SIGNIFICANT CHANGE UP (ref 4.7–6.1)
RBC # FLD: 13.4 % — SIGNIFICANT CHANGE UP (ref 11.5–14.5)
SARS-COV-2 RNA SPEC QL NAA+PROBE: SIGNIFICANT CHANGE UP
SODIUM SERPL-SCNC: 141 MMOL/L — SIGNIFICANT CHANGE UP (ref 135–146)
THC UR QL: NEGATIVE — SIGNIFICANT CHANGE UP
TROPONIN T SERPL-MCNC: 0.43 NG/ML — CRITICAL HIGH
TROPONIN T SERPL-MCNC: 0.68 NG/ML — CRITICAL HIGH
WBC # BLD: 10.91 K/UL — HIGH (ref 4.8–10.8)
WBC # FLD AUTO: 10.91 K/UL — HIGH (ref 4.8–10.8)

## 2022-10-01 PROCEDURE — 99285 EMERGENCY DEPT VISIT HI MDM: CPT | Mod: FS

## 2022-10-01 PROCEDURE — 99291 CRITICAL CARE FIRST HOUR: CPT

## 2022-10-01 PROCEDURE — 99222 1ST HOSP IP/OBS MODERATE 55: CPT

## 2022-10-01 PROCEDURE — 99221 1ST HOSP IP/OBS SF/LOW 40: CPT

## 2022-10-01 PROCEDURE — 93970 EXTREMITY STUDY: CPT | Mod: 26

## 2022-10-01 PROCEDURE — 99232 SBSQ HOSP IP/OBS MODERATE 35: CPT

## 2022-10-01 PROCEDURE — 71045 X-RAY EXAM CHEST 1 VIEW: CPT | Mod: 26

## 2022-10-01 PROCEDURE — 93010 ELECTROCARDIOGRAM REPORT: CPT

## 2022-10-01 RX ORDER — SODIUM CHLORIDE 9 MG/ML
1000 INJECTION INTRAMUSCULAR; INTRAVENOUS; SUBCUTANEOUS ONCE
Refills: 0 | Status: COMPLETED | OUTPATIENT
Start: 2022-10-01 | End: 2022-10-01

## 2022-10-01 RX ORDER — ENOXAPARIN SODIUM 100 MG/ML
70 INJECTION SUBCUTANEOUS EVERY 12 HOURS
Refills: 0 | Status: DISCONTINUED | OUTPATIENT
Start: 2022-10-01 | End: 2022-10-02

## 2022-10-01 RX ORDER — CLOPIDOGREL BISULFATE 75 MG/1
75 TABLET, FILM COATED ORAL DAILY
Refills: 0 | Status: DISCONTINUED | OUTPATIENT
Start: 2022-10-02 | End: 2022-10-03

## 2022-10-01 RX ORDER — ATORVASTATIN CALCIUM 80 MG/1
80 TABLET, FILM COATED ORAL AT BEDTIME
Refills: 0 | Status: DISCONTINUED | OUTPATIENT
Start: 2022-10-01 | End: 2022-10-04

## 2022-10-01 RX ORDER — ASPIRIN/CALCIUM CARB/MAGNESIUM 324 MG
324 TABLET ORAL ONCE
Refills: 0 | Status: COMPLETED | OUTPATIENT
Start: 2022-10-01 | End: 2022-10-01

## 2022-10-01 RX ORDER — SERTRALINE 25 MG/1
150 TABLET, FILM COATED ORAL DAILY
Refills: 0 | Status: DISCONTINUED | OUTPATIENT
Start: 2022-10-01 | End: 2022-10-04

## 2022-10-01 RX ORDER — ASPIRIN/CALCIUM CARB/MAGNESIUM 324 MG
81 TABLET ORAL DAILY
Refills: 0 | Status: DISCONTINUED | OUTPATIENT
Start: 2022-10-02 | End: 2022-10-04

## 2022-10-01 RX ORDER — PANTOPRAZOLE SODIUM 20 MG/1
40 TABLET, DELAYED RELEASE ORAL
Refills: 0 | Status: DISCONTINUED | OUTPATIENT
Start: 2022-10-01 | End: 2022-10-04

## 2022-10-01 RX ORDER — LITHIUM CARBONATE 300 MG/1
300 TABLET, EXTENDED RELEASE ORAL AT BEDTIME
Refills: 0 | Status: DISCONTINUED | OUTPATIENT
Start: 2022-10-01 | End: 2022-10-04

## 2022-10-01 RX ORDER — CHLORHEXIDINE GLUCONATE 213 G/1000ML
1 SOLUTION TOPICAL
Refills: 0 | Status: DISCONTINUED | OUTPATIENT
Start: 2022-10-01 | End: 2022-10-04

## 2022-10-01 RX ORDER — LITHIUM CARBONATE 300 MG/1
300 TABLET, EXTENDED RELEASE ORAL
Refills: 0 | Status: DISCONTINUED | OUTPATIENT
Start: 2022-10-01 | End: 2022-10-01

## 2022-10-01 RX ORDER — ACETAMINOPHEN 500 MG
650 TABLET ORAL EVERY 6 HOURS
Refills: 0 | Status: DISCONTINUED | OUTPATIENT
Start: 2022-10-01 | End: 2022-10-04

## 2022-10-01 RX ORDER — CLONAZEPAM 1 MG
1 TABLET ORAL
Refills: 0 | Status: DISCONTINUED | OUTPATIENT
Start: 2022-10-01 | End: 2022-10-04

## 2022-10-01 RX ORDER — ENOXAPARIN SODIUM 100 MG/ML
75 INJECTION SUBCUTANEOUS ONCE
Refills: 0 | Status: COMPLETED | OUTPATIENT
Start: 2022-10-01 | End: 2022-10-01

## 2022-10-01 RX ORDER — CLOPIDOGREL BISULFATE 75 MG/1
300 TABLET, FILM COATED ORAL ONCE
Refills: 0 | Status: COMPLETED | OUTPATIENT
Start: 2022-10-01 | End: 2022-10-01

## 2022-10-01 RX ORDER — METOPROLOL TARTRATE 50 MG
25 TABLET ORAL
Refills: 0 | Status: DISCONTINUED | OUTPATIENT
Start: 2022-10-01 | End: 2022-10-04

## 2022-10-01 RX ADMIN — Medication 650 MILLIGRAM(S): at 20:00

## 2022-10-01 RX ADMIN — LITHIUM CARBONATE 300 MILLIGRAM(S): 300 TABLET, EXTENDED RELEASE ORAL at 21:10

## 2022-10-01 RX ADMIN — CLOPIDOGREL BISULFATE 300 MILLIGRAM(S): 75 TABLET, FILM COATED ORAL at 03:20

## 2022-10-01 RX ADMIN — Medication 324 MILLIGRAM(S): at 03:22

## 2022-10-01 RX ADMIN — SERTRALINE 150 MILLIGRAM(S): 25 TABLET, FILM COATED ORAL at 11:17

## 2022-10-01 RX ADMIN — CHLORHEXIDINE GLUCONATE 1 APPLICATION(S): 213 SOLUTION TOPICAL at 06:26

## 2022-10-01 RX ADMIN — PANTOPRAZOLE SODIUM 40 MILLIGRAM(S): 20 TABLET, DELAYED RELEASE ORAL at 08:40

## 2022-10-01 RX ADMIN — SODIUM CHLORIDE 1000 MILLILITER(S): 9 INJECTION INTRAMUSCULAR; INTRAVENOUS; SUBCUTANEOUS at 01:51

## 2022-10-01 RX ADMIN — ENOXAPARIN SODIUM 75 MILLIGRAM(S): 100 INJECTION SUBCUTANEOUS at 03:43

## 2022-10-01 RX ADMIN — Medication 650 MILLIGRAM(S): at 19:30

## 2022-10-01 RX ADMIN — ENOXAPARIN SODIUM 70 MILLIGRAM(S): 100 INJECTION SUBCUTANEOUS at 16:21

## 2022-10-01 RX ADMIN — ATORVASTATIN CALCIUM 80 MILLIGRAM(S): 80 TABLET, FILM COATED ORAL at 21:11

## 2022-10-01 NOTE — ED PROVIDER NOTE - INTERPRETATION
Normal sinus rhythm, normal axis, old right bundle alethea block, slight T wave inversions lateral precordial leads, no ST depression or elevation.  Overall similar to prior.  Normal intervals otherwise

## 2022-10-01 NOTE — CONSULT NOTE ADULT - NSCONSULTADDITIONALINFOA_GEN_ALL_CORE
The patient was seen and examined and agree with above . The patient has a history of bipolar disease , increased cholesterol, No previous cardiac history who for the past weeks has had CP . This is somewhat worse on exertion . The patient had increased Troponin . The patient has some CP which is still occurring intermittently . He is on ASA Plavix LMWH and statins . The patient will need cardiac cath on Monday  Will add Beta blockers .

## 2022-10-01 NOTE — PROGRESS NOTE ADULT - SUBJECTIVE AND OBJECTIVE BOX
PROGRESS NOTE:   Erik Jauregui MD  Available on teams    Patient is a 72y old  Male who presents with a chief complaint of NSTEMI (01 Oct 2022 10:33)      SUBJECTIVE / OVERNIGHT EVENTS:  Reports no new complaints  Denies fever, chills, fatigue, chest pain, shortness of breath, abdominal pain, nausea/ vomiting or diarrhea    MEDICATIONS  (STANDING):  atorvastatin 80 milliGRAM(s) Oral at bedtime  chlorhexidine 2% Cloths 1 Application(s) Topical <User Schedule>  enoxaparin Injectable 70 milliGRAM(s) SubCutaneous every 12 hours  lithium 300 milliGRAM(s) Oral at bedtime  pantoprazole    Tablet 40 milliGRAM(s) Oral before breakfast  sertraline 150 milliGRAM(s) Oral daily    MEDICATIONS  (PRN):  clonazePAM  Tablet 1 milliGRAM(s) Oral two times a day PRN Anxiety      CAPILLARY BLOOD GLUCOSE        I&O's Summary    01 Oct 2022 07:01  -  01 Oct 2022 13:20  --------------------------------------------------------  IN: 0 mL / OUT: 675 mL / NET: -675 mL        PHYSICAL EXAM:  Vital Signs Last 24 Hrs  T(C): 36.2 (01 Oct 2022 07:05), Max: 36.4 (01 Oct 2022 00:27)  T(F): 97.1 (01 Oct 2022 07:05), Max: 97.6 (01 Oct 2022 00:27)  HR: 64 (01 Oct 2022 05:05) (58 - 83)  BP: 143/94 (01 Oct 2022 05:05) (140/86 - 154/86)  BP(mean): 112 (01 Oct 2022 05:05) (112 - 112)  RR: 23 (01 Oct 2022 07:05) (16 - 24)  SpO2: 98% (01 Oct 2022 05:05) (98% - 100%)    Parameters below as of 01 Oct 2022 05:05  Patient On (Oxygen Delivery Method): room air      GENERAL: No acute distress, well-developed  HEAD:  Atraumatic, Normocephalic  EYES: EOMI, PERRLA, conjunctiva and sclera clear  NECK: Supple, no lymphadenopathy, no JVD  CHEST/LUNG: CTAB; No wheezes, rales, or rhonchi  HEART: Regular rate and rhythm; No murmurs, rubs, or gallops  ABDOMEN: Soft, non-tender, non-distended; normal bowel sounds, no organomegaly  EXTREMITIES:  2+ peripheral pulses b/l, No clubbing, cyanosis, or edema  NEUROLOGY: A&O x 3, no focal deficits  SKIN: No rashes or lesions    LABS:                        16.6   10.91 )-----------( 213      ( 01 Oct 2022 02:01 )             47.5     10-01    141  |  106  |  17  ----------------------------<  99  4.4   |  22  |  1.2    Ca    10.4      01 Oct 2022 02:01    TPro  6.8  /  Alb  4.4  /  TBili  0.5  /  DBili  x   /  AST  33  /  ALT  15  /  AlkPhos  92  10-01    PT/INR - ( 01 Oct 2022 03:28 )   PT: 11.60 sec;   INR: 1.01 ratio         PTT - ( 01 Oct 2022 03:28 )  PTT:33.3 sec  CARDIAC MARKERS ( 01 Oct 2022 11:43 )  x     / 0.43 ng/mL / x     / x     / 11.2 ng/mL  CARDIAC MARKERS ( 01 Oct 2022 02:01 )  x     / 0.68 ng/mL / x     / x     / 17.0 ng/mL            RADIOLOGY & ADDITIONAL TESTS:  Results Reviewed:   Imaging Personally Reviewed:  Electrocardiogram Personally Reviewed:    COORDINATION OF CARE:  Care Discussed with Consultants/Other Providers [Y/N]:  Prior or Outpatient Records Reviewed [Y/N]:

## 2022-10-01 NOTE — PATIENT PROFILE ADULT - FUNCTIONAL ASSESSMENT - DAILY ACTIVITY 3.
Reason for Call: Medication question    Detailed comments: Received call from José Miguel ([facility]), stated she needs something in writing that the patient is being discontinued from taking Amantadine     Phone Number Patient can be reached at: [phone]    Best Time: Anytime    Can we leave a detailed message on this number? Yes    Call taken on 4/4/2022 at 1:20 PM by Chavez Ackermna       4 = No assist / stand by assistance

## 2022-10-01 NOTE — CONSULT NOTE ADULT - SUBJECTIVE AND OBJECTIVE BOX
Patient is a 72y old  Male who presents with a chief complaint of NSTEMI (01 Oct 2022 03:48)      HPI:  73 yo M PMH Bipolar, Depression. Presents to ED with chest pain. Patient states that chest pain first started about 1 week ago.   At this time pain was left sided, dull, 4-5/10,  worse with ambulation, intermittent lasting for about 30 minutes at a time.   Patient said he awoke this evening with more sharp L sided chest pain 7/10 that lasted for about 15 minutes.   While in ED has had intermittent episodes of pain which resolve spontaneously.   Denies SOB at rest, fever, chills, cough, N/V/D, dizziness, weakness, and any other acute complaints at this time.     Patient do report exertional SOB when going up the stairs     (01 Oct 2022 03:48)    Since admission he reports an improvement in his chest discomfort and anxiety levels, otherwise feels similarly as his H&P    PAST MEDICAL & SURGICAL HISTORY:  Stage 3 chronic kidney disease      Bipolar disorder in full remission, most recent episode unspecified type      Anxiety      Post traumatic stress disorder (PTSD)          SOCIAL HX:   Smoking                         ETOH                            Other    FAMILY HISTORY:  :  No known cardiovacular family hisotry     Review Of Systems:     All ROS are negative except per HPI       Allergies    amoxicillin (Angioedema)    Intolerances          PHYSICAL EXAM    ICU Vital Signs Last 24 Hrs  T(C): 36.2 (01 Oct 2022 07:05), Max: 36.4 (01 Oct 2022 00:27)  T(F): 97.1 (01 Oct 2022 07:05), Max: 97.6 (01 Oct 2022 00:27)  HR: 64 (01 Oct 2022 05:05) (58 - 83)  BP: 143/94 (01 Oct 2022 05:05) (140/86 - 154/86)  BP(mean): 112 (01 Oct 2022 05:05) (112 - 112)  ABP: --  ABP(mean): --  RR: 23 (01 Oct 2022 07:05) (16 - 24)  SpO2: 98% (01 Oct 2022 05:05) (98% - 100%)    O2 Parameters below as of 01 Oct 2022 05:05  Patient On (Oxygen Delivery Method): room air        Constitutional: no acute distress  Neuro: moving all 4 limbs spontaneously  HEENT: NCAT, anicteric  Neck: no visible lymphadenopathy or goiter  Pulm: no respiratory distress  Cardiac: extremities appear pink and well-perfused  Abdomen: non-distended  Extremities: no peripheral edema  Skin: no visible rashes or lesions  Psych: normal speech and content, normal affect            LABS:                          16.6   10.91 )-----------( 213      ( 01 Oct 2022 02:01 )             47.5                                               10-01    141  |  106  |  17  ----------------------------<  99  4.4   |  22  |  1.2    Ca    10.4      01 Oct 2022 02:01    TPro  6.8  /  Alb  4.4  /  TBili  0.5  /  DBili  x   /  AST  33  /  ALT  15  /  AlkPhos  92  10-01      PT/INR - ( 01 Oct 2022 03:28 )   PT: 11.60 sec;   INR: 1.01 ratio         PTT - ( 01 Oct 2022 03:28 )  PTT:33.3 sec                                           CARDIAC MARKERS ( 01 Oct 2022 02:01 )  x     / 0.68 ng/mL / x     / x     / 17.0 ng/mL                                            LIVER FUNCTIONS - ( 01 Oct 2022 02:01 )  Alb: 4.4 g/dL / Pro: 6.8 g/dL / ALK PHOS: 92 U/L / ALT: 15 U/L / AST: 33 U/L / GGT: x                                                                                                                                       X-Rays reviewed                                                                                     ECHO    CXR interpreted by me   film on 10/1 shows small lung volumes, no active intrathoracic pathology by my read    MEDICATIONS  (STANDING):  atorvastatin 80 milliGRAM(s) Oral at bedtime  chlorhexidine 2% Cloths 1 Application(s) Topical <User Schedule>  enoxaparin Injectable 70 milliGRAM(s) SubCutaneous every 12 hours  lithium 300 milliGRAM(s) Oral at bedtime  pantoprazole    Tablet 40 milliGRAM(s) Oral before breakfast  sertraline 150 milliGRAM(s) Oral daily    MEDICATIONS  (PRN):  clonazePAM  Tablet 1 milliGRAM(s) Oral two times a day PRN Anxiety

## 2022-10-01 NOTE — ED PROVIDER NOTE - NS ED ROS FT
Constitutional: no fever, chills, no recent weight loss, change in appetite or malaise  Eyes: no redness/discharge/pain/vision changes  ENT: no rhinorrhea/ear pain/sore throat  Cardiac: see HPI.  Respiratory: No cough or respiratory distress  GI: No nausea, vomiting, diarrhea or abdominal pain.  : No dysuria, frequency, urgency or hematuria  MS: no pain to back or extremities, no loss of ROM, no weakness  Neuro: see HPI. No headache or weakness. No LOC.  Skin: No skin rash.  Endocrine: No history of thyroid disease or diabetes.

## 2022-10-01 NOTE — CONSULT NOTE ADULT - ASSESSMENT
VITA CORREA is a 72y man with a medical history significant for Bipolar disorder who presented initially with several days of dull chest pain, and is now in the critical care unit for NSTEMI.     Impression    NSTEMI, unlikely demand  Anxiety  Chest pain  CKD stage 3      Plan:      CNS:  Continue with Lithium, avoid CNS depressants    HEENT: Oral care      CARDIOVASCULAR: NSTEMI management per cardiology, received DAPT, AC w/ Enoxaparin.  Continue to trend CE until peak.  continuous telemetry monitoring.  High dose statin.  Follow-up cardiology, likely cath monday.    PULMONARY: HOB @ 45 degrees, aspiration precautions      GASTROINTESTINAL: Feeds as tolerated once procedural time identified.  Bowel regimen PRN    GENITOURINARY/RENAL  Valdez: no  Monitor I&O    INFECTIOUS: no active concerns    HEMATOLOGIC  DVT ppx: full AC    ENDOCRINE  Follow up FS.  Insulin protocol as needed.  BG goal 140-180    MSK/DERM  PT/OT      CODE STATUS: FULL  SDU

## 2022-10-01 NOTE — ED ADULT TRIAGE NOTE - CHIEF COMPLAINT QUOTE
BIBA for anxiety. PT states he has been feeling anxious x 2 days and hasn't been sleeping well. Denies SI/HI.

## 2022-10-01 NOTE — CONSULT NOTE ADULT - ASSESSMENT
73 yo M PMH Bipolar, Depression and PTSD, presented to the ED for eval of chest pain.    Impression:  #Chest pain: NSTEMI    Currently CP free ,but endorses intermittent CP at times  Trop 0.68 x1  CKMB 17  ECG: NS, TWI on lateral leads  ECHO(12/16/21): EF 60-65%, Mod LVH, Grade 1 diastolic dysfunction    Plan:  Serial EKG PRN chest pain to assess for ST changes  TSH, HGBA1c, Lipid profile, pBNP. Trend cardiac enzymes  Aspirin 81 PO daily, Lipitor 80 mg PO daily. Can discontinue Plavix  Consider changing to Heparin gtt from therapeutic Lovenox  Introduce low dose beta blockers as tolerated: Metoprolol 12.5 mg BID  ECHO: 2D ECHO to evaluate LV function and wall motion abnormalities  Cxr: pending read  NPO after midnight on Clement 10/2/22 for possible LHC on Monday 10/3/22  Monitor skye    Discussed with Dr Metzger     71 yo M PMH Bipolar, Depression and PTSD, presented to the ED for eval of chest pain.    Impression:  #Chest pain: NSTEMI    Currently CP free ,but endorses intermittent CP at times  Trop 0.68 x1  CKMB 17  ECG: NS, TWI on lateral leads  ECHO(12/16/21): EF 60-65%, Mod LVH, Grade 1 diastolic dysfunction    Plan:  Serial EKG PRN for chest pain to assess for ST changes  TSH, HGBA1c, Lipid profile, pBNP. Trend cardiac enzymes  Aspirin 81 PO daily, Lipitor 80 mg PO daily. Can discontinue Plavix  Consider changing to Heparin gtt from therapeutic Lovenox  Introduce low dose beta blockers as tolerated: Metoprolol 12.5 mg BID  ECHO: 2D ECHO to evaluate LV function and wall motion abnormalities  Cxr: pending read  NPO after midnight on Clement 10/2/22 for possible LHC on Monday 10/3/22  Monitor skye    Discussed with Dr Metzger     71 yo M PMH Bipolar, Depression and PTSD, presented to the ED for eval of chest pain.    Impression:  #Chest pain: NSTEMI    Currently CP free ,but endorses intermittent CP at times  Trop 0.68 x1  CKMB 17  ECG: NS, TWI on lateral leads  ECHO(12/16/21): EF 60-65%, Mod LVH, Grade 1 diastolic dysfunction    Plan:  Serial EKG PRN for chest pain to assess for ST changes  TSH, HGBA1c, Lipid profile, pBNP. Trend cardiac enzymes  Aspirin 81 PO daily, Lipitor 80 mg PO daily, Plavix 75 Po daily  Continue with therapeutic Lovenox  Introduce low dose beta blockers as tolerated: Metoprolol 12.5 mg BID  ECHO: 2D ECHO to evaluate LV function and wall motion abnormalities  Cxr: pending read  NPO after midnight on Clement 10/2/22 for possible LHC on Monday 10/3/22  Monitor skye    Discussed with Dr Metzger

## 2022-10-01 NOTE — ED PROVIDER NOTE - NS ED ATTENDING STATEMENT MOD
This was a shared visit with the COOKIE. I reviewed and verified the documentation and independently performed the documented:

## 2022-10-01 NOTE — H&P ADULT - CRITICAL CARE ATTENDING COMMENT
Patient was seen and examined independently, modification of the original plan was made. Agree with the above, Admit to SDU,TELE, Serial EKGs, trops, echo, LE duplex, dimer, Hba1c, TSH, lipid panel, cardio eval. Started on DAPT and AC with lovenox

## 2022-10-01 NOTE — H&P ADULT - NSHPREVIEWOFSYSTEMS_GEN_ALL_CORE
REVIEW OF SYSTEMS:  CONSTITUTIONAL:  No weakness, fevers or chills  EYES/ENT:  No visual changes;  No vertigo or throat pain   NECK:  No pain or stiffness  RESPIRATORY:  No cough, wheezing, hemoptysis; No shortness of breath  CARDIOVASCULAR:  Presence of worsening chest pain x 1 week as above   GASTROINTESTINAL:  No abdominal or epigastric pain. No nausea, vomiting, or hematemesis; No diarrhea or constipation. No melena or hematochezia.  GENITOURINARY:  No dysuria, frequency or hematuria  NEUROLOGICAL:  No numbness or weakness  SKIN:  No itching, rashes

## 2022-10-01 NOTE — CONSULT NOTE ADULT - SUBJECTIVE AND OBJECTIVE BOX
HPI:  73 yo M PMH Bipolar, Depression. Presents to ED with chest pain. Patient states that chest pain first started about 1 week ago.   At this time pain was left sided, dull, 4-5/10,  worse with ambulation, intermittent lasting for about 30 minutes at a time.   Patient said he awoke this evening with more sharp L sided chest pain 7/10 that lasted for about 15 minutes.   While in ED has had intermittent episodes of pain which resolve spontaneously.   Denies SOB at rest, fever, chills, cough, N/V/D, dizziness, weakness, and any other acute complaints at this time.     Patient do report exertional SOB when going up the stairs     (01 Oct 2022 03:48)        HPI-Cardiology   Pt with the above Hx, evaluated at bedside with Dr Metzger.    Currently admitted in ICU for NSTEMI. Radiology tests and hospital records, were reviewed, as well as previous notes on this patient.       PAST MEDICAL & SURGICAL HISTORY  Stage 3 chronic kidney disease    Bipolar disorder in full remission, most recent episode unspecified type    Anxiety    Post traumatic stress disorder (PTSD)        FAMILY HISTORY:  FAMILY HISTORY:  unknown      SOCIAL HISTORY:  []smoker-former, quit 25 years ago  []Alcohol-  active ETOH use of 1 beer/day  []Drug-denies      ALLERGIES:  amoxicillin (Angioedema)      MEDICATIONS:  MEDICATIONS  (STANDING):  atorvastatin 80 milliGRAM(s) Oral at bedtime  chlorhexidine 2% Cloths 1 Application(s) Topical <User Schedule>  enoxaparin Injectable 70 milliGRAM(s) SubCutaneous every 12 hours  lithium 300 milliGRAM(s) Oral at bedtime  pantoprazole    Tablet 40 milliGRAM(s) Oral before breakfast  sertraline 150 milliGRAM(s) Oral daily    MEDICATIONS  (PRN):  clonazePAM  Tablet 1 milliGRAM(s) Oral two times a day PRN Anxiety      HOME MEDICATIONS:  Home Medications:  KlonoPIN 1 mg oral tablet: 1 tab(s) orally 2 times a day (02 Aug 2022 10:06)  Zoloft: 150 milligram(s) orally once a day (02 Aug 2022 10:06)      VITALS:   T(F): 97.1 (10-01 @ 07:05), Max: 97.6 (10-01 @ 00:27)  HR: 64 (10-01 @ 05:05) (58 - 83)  BP: 143/94 (10-01 @ 05:05) (140/86 - 154/86)  BP(mean): 112 (10-01 @ 05:05) (112 - 112)  RR: 23 (10-01 @ 07:05) (16 - 24)  SpO2: 98% (10-01 @ 05:05) (98% - 100%)    I&O's Summary      REVIEW OF SYSTEMS:  See HPI      PHYSICAL EXAM:  NEURO: patient is awake , alert and oriented  GEN: Not in acute distress  NECK: no thyroid enlargement, no JVD  LUNGS: Clear to auscultation bilaterally   CARDIOVASCULAR: S1/S2 present, RRR , no murmurs or rubs, no carotid bruits,  + PP bilaterally  ABD: Soft, non-tender, non-distended, +BS  EXT: No JUAQUIN  SKIN: Intact    LABS:                        16.6   10.91 )-----------( 213      ( 01 Oct 2022 02:01 )             47.5     10-01    141  |  106  |  17  ----------------------------<  99  4.4   |  22  |  1.2    Ca    10.4      01 Oct 2022 02:01    TPro  6.8  /  Alb  4.4  /  TBili  0.5  /  DBili  x   /  AST  33  /  ALT  15  /  AlkPhos  92  10-01    PT/INR - ( 01 Oct 2022 03:28 )   PT: 11.60 sec;   INR: 1.01 ratio         PTT - ( 01 Oct 2022 03:28 )  PTT:33.3 sec  Troponin T, Serum: 0.68 ng/mL *HH* (10-01-22 @ 02:01)    CARDIAC MARKERS ( 01 Oct 2022 02:01 )  x     / 0.68 ng/mL / x     / x     / 17.0 ng/mL            RADIOLOGY:  < from: TTE Echo Complete w/o Contrast w/ Doppler (12.16.21 @ 15:50) >    Summary:   1. Left ventricular ejection fraction, by visual estimation, is 60 to   65%.   2. Moderate concentric left ventricular hypertrophy.   3. Spectral Doppler shows impaired relaxation pattern of left   ventricular myocardial filling (Grade I diastolic dysfunction).   4. Mildly enlarged right ventricle.   5. Mildly reduced RV systolic function.   6. Mild mitral valve regurgitation.    < end of copied text >         HPI:  73 yo M PMH Bipolar, Depression. Presents to ED with chest pain. Patient states that chest pain first started about 1 week ago.   At this time pain was left sided, dull, 4-5/10,  worse with ambulation, intermittent lasting for about 30 minutes at a time.   Patient said he awoke this evening with more sharp L sided chest pain 7/10 that lasted for about 15 minutes.   While in ED has had intermittent episodes of pain which resolve spontaneously.   Denies SOB at rest, fever, chills, cough, N/V/D, dizziness, weakness, and any other acute complaints at this time.     Patient do report exertional SOB when going up the stairs     (01 Oct 2022 03:48)        HPI-Cardiology   Pt with the above Hx, evaluated at bedside with Dr Metzger. Pt states that he has dizzy/lightheaded the last week, but no LOC or fall. Also states that he ha been having intermittent left sided chest pain. Worse on exertion, non radiating and resolving on its own. Pt denies any Hx of cardiac disease. Radiology tests and hospital records, were reviewed, as well as previous notes on this patient.       PAST MEDICAL & SURGICAL HISTORY  Stage 3 chronic kidney disease    Bipolar disorder in full remission, most recent episode unspecified type    Anxiety    Post traumatic stress disorder (PTSD)        FAMILY HISTORY:  FAMILY HISTORY:  unknown      SOCIAL HISTORY:  []smoker-former, quit 25 years ago  []Alcohol-  active ETOH use of 1 beer/day  []Drug-denies      ALLERGIES:  amoxicillin (Angioedema)      MEDICATIONS:  MEDICATIONS  (STANDING):  atorvastatin 80 milliGRAM(s) Oral at bedtime  chlorhexidine 2% Cloths 1 Application(s) Topical <User Schedule>  enoxaparin Injectable 70 milliGRAM(s) SubCutaneous every 12 hours  lithium 300 milliGRAM(s) Oral at bedtime  pantoprazole    Tablet 40 milliGRAM(s) Oral before breakfast  sertraline 150 milliGRAM(s) Oral daily    MEDICATIONS  (PRN):  clonazePAM  Tablet 1 milliGRAM(s) Oral two times a day PRN Anxiety      HOME MEDICATIONS:  Home Medications:  KlonoPIN 1 mg oral tablet: 1 tab(s) orally 2 times a day (02 Aug 2022 10:06)  Zoloft: 150 milligram(s) orally once a day (02 Aug 2022 10:06)      VITALS:   T(F): 97.1 (10-01 @ 07:05), Max: 97.6 (10-01 @ 00:27)  HR: 64 (10-01 @ 05:05) (58 - 83)  BP: 143/94 (10-01 @ 05:05) (140/86 - 154/86)  BP(mean): 112 (10-01 @ 05:05) (112 - 112)  RR: 23 (10-01 @ 07:05) (16 - 24)  SpO2: 98% (10-01 @ 05:05) (98% - 100%)    I&O's Summary      REVIEW OF SYSTEMS:  See HPI      PHYSICAL EXAM:  NEURO: patient is awake , alert and oriented  GEN: Not in acute distress  NECK: no thyroid enlargement, no JVD  LUNGS: Clear to auscultation bilaterally   CARDIOVASCULAR: S1/S2 present, RRR , no murmurs or rubs, no carotid bruits,  + PP bilaterally  ABD: Soft, non-tender, non-distended, +BS  EXT: No JUAQUIN  SKIN: Intact    LABS:                        16.6   10.91 )-----------( 213      ( 01 Oct 2022 02:01 )             47.5     10-01    141  |  106  |  17  ----------------------------<  99  4.4   |  22  |  1.2    Ca    10.4      01 Oct 2022 02:01    TPro  6.8  /  Alb  4.4  /  TBili  0.5  /  DBili  x   /  AST  33  /  ALT  15  /  AlkPhos  92  10-01    PT/INR - ( 01 Oct 2022 03:28 )   PT: 11.60 sec;   INR: 1.01 ratio         PTT - ( 01 Oct 2022 03:28 )  PTT:33.3 sec  Troponin T, Serum: 0.68 ng/mL *HH* (10-01-22 @ 02:01)    CARDIAC MARKERS ( 01 Oct 2022 02:01 )  x     / 0.68 ng/mL / x     / x     / 17.0 ng/mL            RADIOLOGY:  < from: TTE Echo Complete w/o Contrast w/ Doppler (12.16.21 @ 15:50) >    Summary:   1. Left ventricular ejection fraction, by visual estimation, is 60 to   65%.   2. Moderate concentric left ventricular hypertrophy.   3. Spectral Doppler shows impaired relaxation pattern of left   ventricular myocardial filling (Grade I diastolic dysfunction).   4. Mildly enlarged right ventricle.   5. Mildly reduced RV systolic function.   6. Mild mitral valve regurgitation.    < end of copied text >

## 2022-10-01 NOTE — H&P ADULT - HISTORY OF PRESENT ILLNESS
71 yo M PMH Bipolar, Depression. Presents to ED with chest pain. Patient states that chest pain first started about 1 week ago.   At this time pain was left sided, dull, 5/10,  worse with ambulation, intermittent lasting for about 30 minutes at a time.   Patient said he awoke this evening with more sharp L sided chest pain 7/10 that lasted for about 15 minutes.   While in ED has had intermittent episodes of pain which resolve spontaneously.   Denies SOB, fever, chills, cough, N/V/D, dizziness, weakness, and any other acute complaints at this time.      73 yo M PMH Bipolar, Depression. Presents to ED with chest pain. Patient states that chest pain first started about 1 week ago.   At this time pain was left sided, dull, 4-5/10,  worse with ambulation, intermittent lasting for about 30 minutes at a time.   Patient said he awoke this evening with more sharp L sided chest pain 7/10 that lasted for about 15 minutes.   While in ED has had intermittent episodes of pain which resolve spontaneously.   Denies SOB at rest, fever, chills, cough, N/V/D, dizziness, weakness, and any other acute complaints at this time.     Patient do report exertional SOB when going up the stairs

## 2022-10-01 NOTE — ED PROVIDER NOTE - PHYSICAL EXAMINATION
CONSTITUTIONAL: Well-appearing; well-nourished; in no apparent distress.   EYES: PERRL; EOM intact.   CARDIOVASCULAR: Normal S1, S2; no murmurs, rubs, or gallops.   RESPIRATORY: Normal chest excursion with respiration; breath sounds clear and equal bilaterally; no wheezes, rhonchi, or rales.  GI/: Normal bowel sounds; non-distended; non-tender; no palpable organomegaly.   MS: No calf swelling and tenderness.  SKIN: Normal for age and race; warm; dry; good turgor; no apparent lesions or exudate.   NEURO/PSYCH: A & O x 4; CN II-XII grossly unremarkable. no drifting. strength equal to b/l upper and lower extremities. speaking coherently. nml cerebellum test. ambulate with nml and steady gait. +anxious mood

## 2022-10-01 NOTE — H&P ADULT - NSHPPHYSICALEXAM_GEN_ALL_CORE
PHYSICAL EXAM:    Constitutional: NAD, A + O x 3. Breathing comfortably on room air.     Eyes: PEERL, EOM intact b/l.     Neck: Supple, non-tender, trachea midline.     Respiratory: CTA b/l. No rhonchi/ rales/ wheeze.     Cardiovascular: S1/ S2 present and clear, no murmur, gallops, or rub.     Gastrointestinal: Abd soft + non-tender. BS NA x 4 quadrants.     Extremities: No edema, clubbing, or cyanosis in Ext x 4.     Vascular: DP + radial Pulse 2+ b/l.     Skin: No rash/ focal lesions.     Neuro: Speech clear. CN II-XII intact b/l. Sensation intact to soft touch + Strength 5/5 in ext x 4.

## 2022-10-01 NOTE — ED PROVIDER NOTE - CLINICAL SUMMARY MEDICAL DECISION MAKING FREE TEXT BOX
72-year-old male with a past medical history of bipolar depression, on lithium, presents with a few days of feeling anxious and worsening left-sided chest discomfort.  Some shortness of breath as well.  Also felt like he could not walk a few days ago because of a "rash "that went away with Klonopin.  Now able to walk.  Just feels not himself.  Chest pain is left-sided, pressure, nonradiating, mild to moderate.  On exam nontoxic, vital signs stable, heart regular no murmurs, lungs clear bilaterally, abdomen benign, normal gait, no peripheral edema.  EKG with slight T wave abnormalities compared to prior but old right bundle branch block and overall fairly unchanged.  Given chest discomfort labs sent and has significant elevation in troponin consistent with non-ST elevation MI.  Will give APT, admit to stepdown/telemetry.

## 2022-10-01 NOTE — PATIENT PROFILE ADULT - FALL HARM RISK - HARM RISK INTERVENTIONS
Assistance with ambulation/Assistance OOB with selected safe patient handling equipment/Communicate Risk of Fall with Harm to all staff/Monitor for mental status changes/Reinforce activity limits and safety measures with patient and family/Reorient to person, place and time as needed/Review medications for side effects contributing to fall risk/Sit up slowly, dangle for a short time, stand at bedside before walking/Tailored Fall Risk Interventions/Toileting schedule using arm’s reach rule for commode and bathroom/Use of alarms - bed, chair and/or voice tab/Visual Cue: Yellow wristband and red socks/Bed in lowest position, wheels locked, appropriate side rails in place/Call bell, personal items and telephone in reach/Instruct patient to call for assistance before getting out of bed or chair/Non-slip footwear when patient is out of bed/[place] to call system/Physically safe environment - no spills, clutter or unnecessary equipment/Purposeful Proactive Rounding/Room/bathroom lighting operational, light cord in reach

## 2022-10-01 NOTE — H&P ADULT - NSHPLABSRESULTS_GEN_ALL_CORE
LABS:                          16.6   10.91 )-----------( 213      ( 01 Oct 2022 02:01 )             47.5     10-01    141  |  106  |  17  ----------------------------<  99  4.4   |  22  |  1.2    Ca    10.4      01 Oct 2022 02:01    TPro  6.8  /  Alb  4.4  /  TBili  0.5  /  DBili  x   /  AST  33  /  ALT  15  /  AlkPhos  92  10-01    LIVER FUNCTIONS - ( 01 Oct 2022 02:01 )  Alb: 4.4 g/dL / Pro: 6.8 g/dL / ALK PHOS: 92 U/L / ALT: 15 U/L / AST: 33 U/L / GGT: x           PT/INR - ( 01 Oct 2022 03:28 )   PT: 11.60 sec;   INR: 1.01 ratio         PTT - ( 01 Oct 2022 03:28 )  PTT:33.3 sec    Troponin 0.68 LABS:                          16.6   10.91 )-----------( 213      ( 01 Oct 2022 02:01 )             47.5     10-01    141  |  106  |  17  ----------------------------<  99  4.4   |  22  |  1.2    Ca    10.4      01 Oct 2022 02:01    TPro  6.8  /  Alb  4.4  /  TBili  0.5  /  DBili  x   /  AST  33  /  ALT  15  /  AlkPhos  92  10-01    LIVER FUNCTIONS - ( 01 Oct 2022 02:01 )  Alb: 4.4 g/dL / Pro: 6.8 g/dL / ALK PHOS: 92 U/L / ALT: 15 U/L / AST: 33 U/L / GGT: x           PT/INR - ( 01 Oct 2022 03:28 )   PT: 11.60 sec;   INR: 1.01 ratio         PTT - ( 01 Oct 2022 03:28 )  PTT:33.3 sec    Troponin 0.68    CXR reviewed  previous CXR and head CT/CT chest reviewed

## 2022-10-01 NOTE — ED PROVIDER NOTE - OBJECTIVE STATEMENT
73 yo male hx of anxiety/bipolar BIBA from home c/o anxiety and feeling unsteady and left sided chest pain x 2 days. reported he thought the sxs of unsteady/chest pain was related to his anxiety so he took his prescribed psych med which didn't improve his sxs. He felt really unsteady this evening when he woke up so he called EMS. reports he is feeling better now in ED. Denies fever/chill/HA/dizziness/palpitation/sob/abd pain/n/v/d/ black stool/bloody stool/urinary sxs. denies SI/HI and A/V Hallucination

## 2022-10-01 NOTE — H&P ADULT - ASSESSMENT
71 yo M PMH Bipolar/ Depression. Presents to ED for chest pain x 1 week, worsening yesterday evening. Has experienced intermittent pain in ED which resolves spontaneously.   VSS on room air.     NSTEMI   Trend June   Started on DAPT + Lovenox 1mg/kg Q12   Cardio eval   ECHO   High Intensity statin   Drug screen     Bipolar/ Depression   Lithium lvl WNL   Continue home medications     GI PPX   Hgb A1c/ TSH/ Lipid Panel   Dispo: Admit to Critical Care Service  Case Discussed with Attending on Duty     Alden SRINIVASAN  73 yo M PMH Bipolar/ Depression. Presents to ED for chest pain x 1 week, worsening yesterday evening. Has experienced intermittent pain in ED which resolves spontaneously.   VSS on room air.     NSTEMI   Trend June   Started on DAPT + Lovenox 1mg/kg Q12   Cardio eval   ECHO   High Intensity statin   Drug screen   Serial EKG monitoring     Bipolar/ Depression   Lithium lvl WNL   Continue home medications     GI PPX   Hgb A1c/ TSH/ Lipid Panel   Dispo: Admit to Critical Care Service  Case Discussed with Attending on Duty     Aledn SRINIVASAN  71 yo M PMH Bipolar/ Depression. Presents to ED for chest pain x 1 week, worsening yesterday evening. Has experienced intermittent pain in ED which resolves spontaneously.   VSS on room air.     NSTEMI   Trend June   Started on DAPT + Lovenox 1mg/kg Q12   Cardio eval   ECHO   High Intensity statin   Drug screen   Serial EKG monitoring, TELE, Check D-dimer, LE duplex    Bipolar/ Depression   Lithium level WNL   Continue home medications     GI PPX   Hgb A1c/ TSH/ Lipid Panel   Dispo: Admit to Critical Care Service  Case Discussed with Attending on Duty

## 2022-10-02 LAB
ALBUMIN SERPL ELPH-MCNC: 4.1 G/DL — SIGNIFICANT CHANGE UP (ref 3.5–5.2)
ALP SERPL-CCNC: 79 U/L — SIGNIFICANT CHANGE UP (ref 30–115)
ALT FLD-CCNC: 12 U/L — SIGNIFICANT CHANGE UP (ref 0–41)
ANION GAP SERPL CALC-SCNC: 11 MMOL/L — SIGNIFICANT CHANGE UP (ref 7–14)
AST SERPL-CCNC: 24 U/L — SIGNIFICANT CHANGE UP (ref 0–41)
BILIRUB SERPL-MCNC: 0.5 MG/DL — SIGNIFICANT CHANGE UP (ref 0.2–1.2)
BUN SERPL-MCNC: 15 MG/DL — SIGNIFICANT CHANGE UP (ref 10–20)
CALCIUM SERPL-MCNC: 9.7 MG/DL — SIGNIFICANT CHANGE UP (ref 8.4–10.5)
CHLORIDE SERPL-SCNC: 110 MMOL/L — SIGNIFICANT CHANGE UP (ref 98–110)
CHOLEST SERPL-MCNC: 221 MG/DL — HIGH
CO2 SERPL-SCNC: 22 MMOL/L — SIGNIFICANT CHANGE UP (ref 17–32)
CREAT SERPL-MCNC: 1.2 MG/DL — SIGNIFICANT CHANGE UP (ref 0.7–1.5)
EGFR: 64 ML/MIN/1.73M2 — SIGNIFICANT CHANGE UP
GLUCOSE SERPL-MCNC: 84 MG/DL — SIGNIFICANT CHANGE UP (ref 70–99)
HCT VFR BLD CALC: 44.7 % — SIGNIFICANT CHANGE UP (ref 42–52)
HDLC SERPL-MCNC: 28 MG/DL — LOW
HGB BLD-MCNC: 15.4 G/DL — SIGNIFICANT CHANGE UP (ref 14–18)
LIPID PNL WITH DIRECT LDL SERPL: 144 MG/DL — HIGH
MAGNESIUM SERPL-MCNC: 2.1 MG/DL — SIGNIFICANT CHANGE UP (ref 1.8–2.4)
MCHC RBC-ENTMCNC: 33.5 PG — HIGH (ref 27–31)
MCHC RBC-ENTMCNC: 34.5 G/DL — SIGNIFICANT CHANGE UP (ref 32–37)
MCV RBC AUTO: 97.2 FL — HIGH (ref 80–94)
NON HDL CHOLESTEROL: 193 MG/DL — HIGH
NRBC # BLD: 0 /100 WBCS — SIGNIFICANT CHANGE UP (ref 0–0)
PHOSPHATE SERPL-MCNC: 2.4 MG/DL — SIGNIFICANT CHANGE UP (ref 2.1–4.9)
PLATELET # BLD AUTO: 182 K/UL — SIGNIFICANT CHANGE UP (ref 130–400)
POTASSIUM SERPL-MCNC: 4.1 MMOL/L — SIGNIFICANT CHANGE UP (ref 3.5–5)
POTASSIUM SERPL-SCNC: 4.1 MMOL/L — SIGNIFICANT CHANGE UP (ref 3.5–5)
PROT SERPL-MCNC: 6.3 G/DL — SIGNIFICANT CHANGE UP (ref 6–8)
RBC # BLD: 4.6 M/UL — LOW (ref 4.7–6.1)
RBC # FLD: 13.4 % — SIGNIFICANT CHANGE UP (ref 11.5–14.5)
SODIUM SERPL-SCNC: 143 MMOL/L — SIGNIFICANT CHANGE UP (ref 135–146)
TRIGL SERPL-MCNC: 245 MG/DL — HIGH
WBC # BLD: 9.04 K/UL — SIGNIFICANT CHANGE UP (ref 4.8–10.8)
WBC # FLD AUTO: 9.04 K/UL — SIGNIFICANT CHANGE UP (ref 4.8–10.8)

## 2022-10-02 PROCEDURE — 99232 SBSQ HOSP IP/OBS MODERATE 35: CPT

## 2022-10-02 PROCEDURE — 99233 SBSQ HOSP IP/OBS HIGH 50: CPT

## 2022-10-02 RX ADMIN — SERTRALINE 150 MILLIGRAM(S): 25 TABLET, FILM COATED ORAL at 11:18

## 2022-10-02 RX ADMIN — LITHIUM CARBONATE 300 MILLIGRAM(S): 300 TABLET, EXTENDED RELEASE ORAL at 21:23

## 2022-10-02 RX ADMIN — CLOPIDOGREL BISULFATE 75 MILLIGRAM(S): 75 TABLET, FILM COATED ORAL at 11:18

## 2022-10-02 RX ADMIN — Medication 25 MILLIGRAM(S): at 06:10

## 2022-10-02 RX ADMIN — Medication 81 MILLIGRAM(S): at 11:19

## 2022-10-02 RX ADMIN — ENOXAPARIN SODIUM 70 MILLIGRAM(S): 100 INJECTION SUBCUTANEOUS at 02:32

## 2022-10-02 RX ADMIN — CHLORHEXIDINE GLUCONATE 1 APPLICATION(S): 213 SOLUTION TOPICAL at 06:08

## 2022-10-02 RX ADMIN — ENOXAPARIN SODIUM 70 MILLIGRAM(S): 100 INJECTION SUBCUTANEOUS at 14:43

## 2022-10-02 RX ADMIN — ATORVASTATIN CALCIUM 80 MILLIGRAM(S): 80 TABLET, FILM COATED ORAL at 21:23

## 2022-10-02 RX ADMIN — PANTOPRAZOLE SODIUM 40 MILLIGRAM(S): 20 TABLET, DELAYED RELEASE ORAL at 06:10

## 2022-10-02 NOTE — PROGRESS NOTE ADULT - SUBJECTIVE AND OBJECTIVE BOX
Subjective/Objective:     HPI-Cardiology  Pt evaluated at bedside with Dr Lindquist. Currently admitted in........for........ Radiology tests and hospital records, were reviewed, as well as previous notes on this patient. Pt currently......    MEDICATIONS  (STANDING):  aspirin  chewable 81 milliGRAM(s) Oral daily  atorvastatin 80 milliGRAM(s) Oral at bedtime  chlorhexidine 2% Cloths 1 Application(s) Topical <User Schedule>  clopidogrel Tablet 75 milliGRAM(s) Oral daily  enoxaparin Injectable 70 milliGRAM(s) SubCutaneous every 12 hours  lithium 300 milliGRAM(s) Oral at bedtime  metoprolol tartrate 25 milliGRAM(s) Oral two times a day  pantoprazole    Tablet 40 milliGRAM(s) Oral before breakfast  sertraline 150 milliGRAM(s) Oral daily    MEDICATIONS  (PRN):  acetaminophen     Tablet .. 650 milliGRAM(s) Oral every 6 hours PRN Temp greater or equal to 38C (100.4F), Severe Pain (7 - 10)  clonazePAM  Tablet 1 milliGRAM(s) Oral two times a day PRN Anxiety          Vital Signs Last 24 Hrs  T(C): 35.9 (02 Oct 2022 07:05), Max: 36.2 (01 Oct 2022 23:00)  T(F): 96.6 (02 Oct 2022 07:05), Max: 97.2 (01 Oct 2022 23:00)  HR: 57 (02 Oct 2022 03:00) (57 - 73)  BP: 138/78 (02 Oct 2022 03:00) (138/78 - 160/78)  BP(mean): 103 (02 Oct 2022 03:00) (103 - 113)  RR: 17 (02 Oct 2022 07:05) (16 - 25)  SpO2: 96% (02 Oct 2022 03:00) (95% - 97%)    Parameters below as of 02 Oct 2022 03:00  Patient On (Oxygen Delivery Method): room air      I&O's Detail    01 Oct 2022 07:01  -  02 Oct 2022 07:00  --------------------------------------------------------  IN:  Total IN: 0 mL    OUT:    Voided (mL): 2175 mL  Total OUT: 2175 mL    Total NET: -2175 mL        PHYSICAL EXAM:  NEURO: patient is awake , alert and oriented  GEN: Not in acute distress  NECK: no thyroid enlargement, no JVD  LUNGS: Clear to auscultation bilaterally   CARDIOVASCULAR: S1/S2 present, RRR , no murmurs or rubs, no carotid bruits,  + PP bilaterally  ABD: Soft, non-tender, non-distended, +BS  EXT: No JUAQUIN  SKIN: Intact      EKG/ TELEM:    LABS:                          15.4   9.04  )-----------( 182      ( 02 Oct 2022 05:21 )             44.7     PT/INR - ( 01 Oct 2022 03:28 )   PT: 11.60 sec;   INR: 1.01 ratio         PTT - ( 01 Oct 2022 03:28 )  PTT:33.3 sec  02 Oct 2022 05:21    143    |  110    |  15     ----------------------------<  84     4.1     |  22     |  1.2    01 Oct 2022 02:01    141    |  106    |  17     ----------------------------<  99     4.4     |  22     |  1.2      Ca    9.7        02 Oct 2022 05:21  Ca    10.4       01 Oct 2022 02:01  Phos  2.4       02 Oct 2022 05:21  Mg     2.1       02 Oct 2022 05:21    TPro  6.3    /  Alb  4.1    /  TBili  0.5    /  DBili  x      /  AST  24     /  ALT  12     /  AlkPhos  79     02 Oct 2022 05:21  TPro  6.8    /  Alb  4.4    /  TBili  0.5    /  DBili  x      /  AST  33     /  ALT  15     /  AlkPhos  92     01 Oct 2022 02:01    CARDIAC MARKERS ( 01 Oct 2022 11:43 )  x     / 0.43 ng/mL / x     / x     / 11.2 ng/mL  CARDIAC MARKERS ( 01 Oct 2022 02:01 )  x     / 0.68 ng/mL / x     / x     / 17.0 ng/mL            Diagnostic testing:        Assessment and Plan:         Subjective/Objective:     HPI-Cardiology/Events/Update  Pt evaluated at bedside. Currently admitted in ICU for NSTEMI. Pt still c/o mild CP at times. No other complaints. Radiology tests and hospital records, were reviewed, as well as previous notes on this patient.       MEDICATIONS  (STANDING):  aspirin  chewable 81 milliGRAM(s) Oral daily  atorvastatin 80 milliGRAM(s) Oral at bedtime  chlorhexidine 2% Cloths 1 Application(s) Topical <User Schedule>  clopidogrel Tablet 75 milliGRAM(s) Oral daily  enoxaparin Injectable 70 milliGRAM(s) SubCutaneous every 12 hours  lithium 300 milliGRAM(s) Oral at bedtime  metoprolol tartrate 25 milliGRAM(s) Oral two times a day  pantoprazole    Tablet 40 milliGRAM(s) Oral before breakfast  sertraline 150 milliGRAM(s) Oral daily    MEDICATIONS  (PRN):  acetaminophen     Tablet .. 650 milliGRAM(s) Oral every 6 hours PRN Temp greater or equal to 38C (100.4F), Severe Pain (7 - 10)  clonazePAM  Tablet 1 milliGRAM(s) Oral two times a day PRN Anxiety          Vital Signs Last 24 Hrs  T(C): 35.9 (02 Oct 2022 07:05), Max: 36.2 (01 Oct 2022 23:00)  T(F): 96.6 (02 Oct 2022 07:05), Max: 97.2 (01 Oct 2022 23:00)  HR: 57 (02 Oct 2022 03:00) (57 - 73)  BP: 138/78 (02 Oct 2022 03:00) (138/78 - 160/78)  BP(mean): 103 (02 Oct 2022 03:00) (103 - 113)  RR: 17 (02 Oct 2022 07:05) (16 - 25)  SpO2: 96% (02 Oct 2022 03:00) (95% - 97%)    Parameters below as of 02 Oct 2022 03:00  Patient On (Oxygen Delivery Method): room air      I&O's Detail    01 Oct 2022 07:01  -  02 Oct 2022 07:00  --------------------------------------------------------  IN:  Total IN: 0 mL    OUT:    Voided (mL): 2175 mL  Total OUT: 2175 mL    Total NET: -2175 mL        PHYSICAL EXAM:  NEURO: patient is awake , alert and oriented  GEN: Not in acute distress  NECK: no thyroid enlargement, no JVD  LUNGS: Clear to auscultation bilaterally   CARDIOVASCULAR: S1/S2 present, RRR , no murmurs or rubs, no carotid bruits,  + PP bilaterally  ABD: Soft, non-tender, non-distended, +BS  EXT: No JUAQUIN  SKIN: Intact      EKG/ TELEM:    LABS:                          15.4   9.04  )-----------( 182      ( 02 Oct 2022 05:21 )             44.7     PT/INR - ( 01 Oct 2022 03:28 )   PT: 11.60 sec;   INR: 1.01 ratio         PTT - ( 01 Oct 2022 03:28 )  PTT:33.3 sec  02 Oct 2022 05:21    143    |  110    |  15     ----------------------------<  84     4.1     |  22     |  1.2    01 Oct 2022 02:01    141    |  106    |  17     ----------------------------<  99     4.4     |  22     |  1.2      Ca    9.7        02 Oct 2022 05:21  Ca    10.4       01 Oct 2022 02:01  Phos  2.4       02 Oct 2022 05:21  Mg     2.1       02 Oct 2022 05:21    TPro  6.3    /  Alb  4.1    /  TBili  0.5    /  DBili  x      /  AST  24     /  ALT  12     /  AlkPhos  79     02 Oct 2022 05:21  TPro  6.8    /  Alb  4.4    /  TBili  0.5    /  DBili  x      /  AST  33     /  ALT  15     /  AlkPhos  92     01 Oct 2022 02:01    CARDIAC MARKERS ( 01 Oct 2022 11:43 )  x     / 0.43 ng/mL / x     / x     / 11.2 ng/mL  CARDIAC MARKERS ( 01 Oct 2022 02:01 )  x     / 0.68 ng/mL / x     / x     / 17.0 ng/mL            Diagnostic testing:  < from: Xray Chest 1 View- PORTABLE-Urgent (Xray Chest 1 View- PORTABLE-Urgent .) (10.01.22 @ 01:26) >    Impression:    No radiographic evidence of acute cardiopulmonary disease.    --- End of Report ---    < end of copied text >              < from: [facility] Duplex Lower Ext Vein Scan, Bilat (10.01.22 @ 08:59) >    Impression:    No evidence of deep venous thrombosis or superficial thrombophlebitis in   the bilateral lower extremities.      < end of copied text >              < from: TTE Echo Complete w/o Contrast w/ Doppler (12.16.21 @ 15:50) >    Summary:   1. Left ventricular ejection fraction, by visual estimation, is 60 to   65%.   2. Moderate concentric left ventricular hypertrophy.   3. Spectral Doppler shows impaired relaxation pattern of left   ventricular myocardial filling (Grade I diastolic dysfunction).   4. Mildly enlarged right ventricle.   5. Mildly reduced RV systolic function.   6. Mild mitral valve regurgitation.    --- End of Report ---    < end of copied text >

## 2022-10-02 NOTE — PROGRESS NOTE ADULT - SUBJECTIVE AND OBJECTIVE BOX
Patient reports on/off chest pain for one month, no chest pain now      T(F): 96.6 (10-02-22 @ 07:05), Max: 97.2 (10-01-22 @ 23:00)  HR: 57 (10-02-22 @ 03:00)  BP: 138/78 (10-02-22 @ 03:00)  RR: 17 (10-02-22 @ 07:05)  SpO2: 96% (10-02-22 @ 03:00) (95% - 97%)    PHYSICAL EXAM:  GENERAL: NAD  HEAD:  Atraumatic, Normocephalic  EYES: EOMI, PERRLA, conjunctiva and sclera clear  NERVOUS SYSTEM:  Alert & Oriented X3, no focal deficits   CHEST/LUNG: Clear to percussion bilaterally; No rales, rhonchi, wheezing, or rubs  HEART: Regular rate and rhythm; No murmurs, rubs, or gallops  ABDOMEN: Soft, Nontender, Nondistended; Bowel sounds present  EXTREMITIES:  2+ Peripheral Pulses, No clubbing, cyanosis, or edema    LABS  10-02    143  |  110  |  15  ----------------------------<  84  4.1   |  22  |  1.2    Ca    9.7      02 Oct 2022 05:21  Phos  2.4     10-02  Mg     2.1     10-02    TPro  6.3  /  Alb  4.1  /  TBili  0.5  /  DBili  x   /  AST  24  /  ALT  12  /  AlkPhos  79  10-02                          15.4   9.04  )-----------( 182      ( 02 Oct 2022 05:21 )             44.7     PT/INR - ( 01 Oct 2022 03:28 )   PT: 11.60 sec;   INR: 1.01 ratio         PTT - ( 01 Oct 2022 03:28 )  PTT:33.3 sec    CARDIAC ENZYMES    CKMB Units: 11.2 (10-01 @ 11:43)  CKMB Units: 17.0 (10-01 @ 02:01)    Troponin T, Serum: 0.43 ng/mL (10-01-22 @ 11:43)  Troponin T, Serum: 0.68 ng/mL (10-01-22 @ 02:01)    < from: 12 Lead ECG (12.29.21 @ 10:30) >  Diagnosis Line Normal sinus rhythm    < end of copied text >      RADIOLOGY  < from: Xray Chest 1 View- PORTABLE-Urgent (Xray Chest 1 View- PORTABLE-Urgent .) (10.01.22 @ 01:26) >  Impression:    No radiographic evidence of acute cardiopulmonary disease.        < end of copied text >    MEDICATIONS  (STANDING):  aspirin  chewable 81 milliGRAM(s) Oral daily  atorvastatin 80 milliGRAM(s) Oral at bedtime  chlorhexidine 2% Cloths 1 Application(s) Topical <User Schedule>  clopidogrel Tablet 75 milliGRAM(s) Oral daily  enoxaparin Injectable 70 milliGRAM(s) SubCutaneous every 12 hours  lithium 300 milliGRAM(s) Oral at bedtime  metoprolol tartrate 25 milliGRAM(s) Oral two times a day  pantoprazole    Tablet 40 milliGRAM(s) Oral before breakfast  sertraline 150 milliGRAM(s) Oral daily    MEDICATIONS  (PRN):  acetaminophen     Tablet .. 650 milliGRAM(s) Oral every 6 hours PRN Temp greater or equal to 38C (100.4F), Severe Pain (7 - 10)  clonazePAM  Tablet 1 milliGRAM(s) Oral two times a day PRN Anxiety

## 2022-10-02 NOTE — PROGRESS NOTE ADULT - SUBJECTIVE AND OBJECTIVE BOX
Patient is a 72y old  Male who presents with a chief complaint of NSTEMI (02 Oct 2022 10:38)        Over Night Events:    feeling and looking better, less anxious.  serum trop declining.    ROS:     All ROS are negative except HPI       PHYSICAL EXAM    ICU Vital Signs Last 24 Hrs  T(C): 35.9 (02 Oct 2022 07:05), Max: 36.2 (01 Oct 2022 23:00)  T(F): 96.6 (02 Oct 2022 07:05), Max: 97.2 (01 Oct 2022 23:00)  HR: 57 (02 Oct 2022 03:00) (57 - 73)  BP: 138/78 (02 Oct 2022 03:00) (138/78 - 160/78)  BP(mean): 103 (02 Oct 2022 03:00) (103 - 113)  ABP: --  ABP(mean): --  RR: 17 (02 Oct 2022 07:05) (16 - 25)  SpO2: 96% (02 Oct 2022 03:00) (95% - 97%)    O2 Parameters below as of 02 Oct 2022 03:00  Patient On (Oxygen Delivery Method): room air            Constitutional: no acute distress, well nourished well developed  Neuro: moving all 4 limbs spontaneously, no facial droop or dysarthria  HEENT: NCAT, anicteric  Neck: no visible lymphadenopathy or goiter  Pulm: no respiratory distress. clear to auscultation bilaterally  Cardiac: extremities appear pink and well-perfused.  regular rhythm and rate, no murmur detected  Abdomen: non-distended  Extremities: no peripheral edema      10-01-22 @ 07:01  -  10-02-22 @ 07:00  --------------------------------------------------------  IN:  Total IN: 0 mL    OUT:    Voided (mL): 2175 mL  Total OUT: 2175 mL    Total NET: -2175 mL          LABS:                            15.4   9.04  )-----------( 182      ( 02 Oct 2022 05:21 )             44.7                                               10-02    143  |  110  |  15  ----------------------------<  84  4.1   |  22  |  1.2    Ca    9.7      02 Oct 2022 05:21  Phos  2.4     10-02  Mg     2.1     10-02    TPro  6.3  /  Alb  4.1  /  TBili  0.5  /  DBili  x   /  AST  24  /  ALT  12  /  AlkPhos  79  10-02      PT/INR - ( 01 Oct 2022 03:28 )   PT: 11.60 sec;   INR: 1.01 ratio         PTT - ( 01 Oct 2022 03:28 )  PTT:33.3 sec                                           CARDIAC MARKERS ( 01 Oct 2022 11:43 )  x     / 0.43 ng/mL / x     / x     / 11.2 ng/mL  CARDIAC MARKERS ( 01 Oct 2022 02:01 )  x     / 0.68 ng/mL / x     / x     / 17.0 ng/mL                                            LIVER FUNCTIONS - ( 02 Oct 2022 05:21 )  Alb: 4.1 g/dL / Pro: 6.3 g/dL / ALK PHOS: 79 U/L / ALT: 12 U/L / AST: 24 U/L / GGT: x                                                                                                                                       MEDICATIONS  (STANDING):  aspirin  chewable 81 milliGRAM(s) Oral daily  atorvastatin 80 milliGRAM(s) Oral at bedtime  chlorhexidine 2% Cloths 1 Application(s) Topical <User Schedule>  clopidogrel Tablet 75 milliGRAM(s) Oral daily  enoxaparin Injectable 70 milliGRAM(s) SubCutaneous every 12 hours  lithium 300 milliGRAM(s) Oral at bedtime  metoprolol tartrate 25 milliGRAM(s) Oral two times a day  pantoprazole    Tablet 40 milliGRAM(s) Oral before breakfast  sertraline 150 milliGRAM(s) Oral daily    MEDICATIONS  (PRN):  acetaminophen     Tablet .. 650 milliGRAM(s) Oral every 6 hours PRN Temp greater or equal to 38C (100.4F), Severe Pain (7 - 10)  clonazePAM  Tablet 1 milliGRAM(s) Oral two times a day PRN Anxiety      New X-rays reviewed:       ECHO reviewed    CXR interpreted by me:

## 2022-10-03 LAB
A1C WITH ESTIMATED AVERAGE GLUCOSE RESULT: 4.8 % — SIGNIFICANT CHANGE UP (ref 4–5.6)
ALBUMIN SERPL ELPH-MCNC: 4.4 G/DL — SIGNIFICANT CHANGE UP (ref 3.5–5.2)
ALP SERPL-CCNC: 78 U/L — SIGNIFICANT CHANGE UP (ref 30–115)
ALT FLD-CCNC: 14 U/L — SIGNIFICANT CHANGE UP (ref 0–41)
ANION GAP SERPL CALC-SCNC: 8 MMOL/L — SIGNIFICANT CHANGE UP (ref 7–14)
AST SERPL-CCNC: 19 U/L — SIGNIFICANT CHANGE UP (ref 0–41)
BASOPHILS # BLD AUTO: 0.05 K/UL — SIGNIFICANT CHANGE UP (ref 0–0.2)
BASOPHILS NFR BLD AUTO: 0.5 % — SIGNIFICANT CHANGE UP (ref 0–1)
BILIRUB SERPL-MCNC: 0.6 MG/DL — SIGNIFICANT CHANGE UP (ref 0.2–1.2)
BUN SERPL-MCNC: 18 MG/DL — SIGNIFICANT CHANGE UP (ref 10–20)
CALCIUM SERPL-MCNC: 9.9 MG/DL — SIGNIFICANT CHANGE UP (ref 8.4–10.5)
CHLORIDE SERPL-SCNC: 106 MMOL/L — SIGNIFICANT CHANGE UP (ref 98–110)
CO2 SERPL-SCNC: 23 MMOL/L — SIGNIFICANT CHANGE UP (ref 17–32)
CREAT SERPL-MCNC: 1.2 MG/DL — SIGNIFICANT CHANGE UP (ref 0.7–1.5)
EGFR: 64 ML/MIN/1.73M2 — SIGNIFICANT CHANGE UP
EOSINOPHIL # BLD AUTO: 0.35 K/UL — SIGNIFICANT CHANGE UP (ref 0–0.7)
EOSINOPHIL NFR BLD AUTO: 3.8 % — SIGNIFICANT CHANGE UP (ref 0–8)
ESTIMATED AVERAGE GLUCOSE: 91 MG/DL — SIGNIFICANT CHANGE UP (ref 68–114)
GLUCOSE SERPL-MCNC: 90 MG/DL — SIGNIFICANT CHANGE UP (ref 70–99)
HCT VFR BLD CALC: 44.7 % — SIGNIFICANT CHANGE UP (ref 42–52)
HGB BLD-MCNC: 15.1 G/DL — SIGNIFICANT CHANGE UP (ref 14–18)
IMM GRANULOCYTES NFR BLD AUTO: 0.2 % — SIGNIFICANT CHANGE UP (ref 0.1–0.3)
LYMPHOCYTES # BLD AUTO: 3.11 K/UL — SIGNIFICANT CHANGE UP (ref 1.2–3.4)
LYMPHOCYTES # BLD AUTO: 34 % — SIGNIFICANT CHANGE UP (ref 20.5–51.1)
MAGNESIUM SERPL-MCNC: 2.1 MG/DL — SIGNIFICANT CHANGE UP (ref 1.8–2.4)
MCHC RBC-ENTMCNC: 32.7 PG — HIGH (ref 27–31)
MCHC RBC-ENTMCNC: 33.8 G/DL — SIGNIFICANT CHANGE UP (ref 32–37)
MCV RBC AUTO: 96.8 FL — HIGH (ref 80–94)
MONOCYTES # BLD AUTO: 0.41 K/UL — SIGNIFICANT CHANGE UP (ref 0.1–0.6)
MONOCYTES NFR BLD AUTO: 4.5 % — SIGNIFICANT CHANGE UP (ref 1.7–9.3)
NEUTROPHILS # BLD AUTO: 5.22 K/UL — SIGNIFICANT CHANGE UP (ref 1.4–6.5)
NEUTROPHILS NFR BLD AUTO: 57 % — SIGNIFICANT CHANGE UP (ref 42.2–75.2)
NRBC # BLD: 0 /100 WBCS — SIGNIFICANT CHANGE UP (ref 0–0)
PLATELET # BLD AUTO: 195 K/UL — SIGNIFICANT CHANGE UP (ref 130–400)
POTASSIUM SERPL-MCNC: 4.3 MMOL/L — SIGNIFICANT CHANGE UP (ref 3.5–5)
POTASSIUM SERPL-SCNC: 4.3 MMOL/L — SIGNIFICANT CHANGE UP (ref 3.5–5)
PROT SERPL-MCNC: 6.7 G/DL — SIGNIFICANT CHANGE UP (ref 6–8)
RBC # BLD: 4.62 M/UL — LOW (ref 4.7–6.1)
RBC # FLD: 13.4 % — SIGNIFICANT CHANGE UP (ref 11.5–14.5)
SODIUM SERPL-SCNC: 137 MMOL/L — SIGNIFICANT CHANGE UP (ref 135–146)
TSH SERPL-MCNC: 1.89 UIU/ML — SIGNIFICANT CHANGE UP (ref 0.27–4.2)
WBC # BLD: 9.16 K/UL — SIGNIFICANT CHANGE UP (ref 4.8–10.8)
WBC # FLD AUTO: 9.16 K/UL — SIGNIFICANT CHANGE UP (ref 4.8–10.8)

## 2022-10-03 PROCEDURE — 99233 SBSQ HOSP IP/OBS HIGH 50: CPT

## 2022-10-03 PROCEDURE — 92928 PRQ TCAT PLMT NTRAC ST 1 LES: CPT | Mod: RC

## 2022-10-03 PROCEDURE — 92978 ENDOLUMINL IVUS OCT C 1ST: CPT | Mod: 26,RC

## 2022-10-03 PROCEDURE — 93010 ELECTROCARDIOGRAM REPORT: CPT

## 2022-10-03 PROCEDURE — 93458 L HRT ARTERY/VENTRICLE ANGIO: CPT | Mod: 26,XU

## 2022-10-03 RX ORDER — TICAGRELOR 90 MG/1
1 TABLET ORAL
Qty: 60 | Refills: 2
Start: 2022-10-03 | End: 2022-12-31

## 2022-10-03 RX ORDER — SODIUM CHLORIDE 9 MG/ML
1000 INJECTION INTRAMUSCULAR; INTRAVENOUS; SUBCUTANEOUS
Refills: 0 | Status: DISCONTINUED | OUTPATIENT
Start: 2022-10-03 | End: 2022-10-04

## 2022-10-03 RX ORDER — TICAGRELOR 90 MG/1
90 TABLET ORAL EVERY 12 HOURS
Refills: 0 | Status: DISCONTINUED | OUTPATIENT
Start: 2022-10-03 | End: 2022-10-04

## 2022-10-03 RX ADMIN — CLOPIDOGREL BISULFATE 75 MILLIGRAM(S): 75 TABLET, FILM COATED ORAL at 11:08

## 2022-10-03 RX ADMIN — ATORVASTATIN CALCIUM 80 MILLIGRAM(S): 80 TABLET, FILM COATED ORAL at 21:58

## 2022-10-03 RX ADMIN — SODIUM CHLORIDE 75 MILLILITER(S): 9 INJECTION INTRAMUSCULAR; INTRAVENOUS; SUBCUTANEOUS at 09:01

## 2022-10-03 RX ADMIN — SODIUM CHLORIDE 75 MILLILITER(S): 9 INJECTION INTRAMUSCULAR; INTRAVENOUS; SUBCUTANEOUS at 17:52

## 2022-10-03 RX ADMIN — Medication 25 MILLIGRAM(S): at 18:06

## 2022-10-03 RX ADMIN — CHLORHEXIDINE GLUCONATE 1 APPLICATION(S): 213 SOLUTION TOPICAL at 03:57

## 2022-10-03 RX ADMIN — Medication 81 MILLIGRAM(S): at 11:08

## 2022-10-03 RX ADMIN — TICAGRELOR 90 MILLIGRAM(S): 90 TABLET ORAL at 20:40

## 2022-10-03 RX ADMIN — LITHIUM CARBONATE 300 MILLIGRAM(S): 300 TABLET, EXTENDED RELEASE ORAL at 21:58

## 2022-10-03 RX ADMIN — Medication 1 MILLIGRAM(S): at 22:01

## 2022-10-03 RX ADMIN — SERTRALINE 150 MILLIGRAM(S): 25 TABLET, FILM COATED ORAL at 11:27

## 2022-10-03 NOTE — PROGRESS NOTE ADULT - SUBJECTIVE AND OBJECTIVE BOX
Patient is a 72y old  Male who presents with a chief complaint of NSTEMI (02 Oct 2022 11:23)      T(F): 97.2 (10-03-22 @ 07:07), Max: 97.2 (10-03-22 @ 07:07)  HR: 60 (10-03-22 @ 07:05)  BP: 156/86 (10-03-22 @ 07:05)  RR: 28 (10-03-22 @ 07:07)  SpO2: 97% (10-03-22 @ 07:05) (95% - 97%)    PHYSICAL EXAM:  GENERAL: NAD, well-groomed, well-developed  HEAD:  Atraumatic, Normocephalic  EYES: EOMI, PERRLA, conjunctiva and sclera clear  ENMT: No tonsillar erythema, exudates, or enlargement; Moist mucous membranes, Good dentition, No lesions  NECK: Supple, No JVD, Normal thyroid  NERVOUS SYSTEM:  Alert & Oriented X3,  Motor Strength 5/5 B/L upper and lower extremities  CHEST/LUNG: Clear to percussion bilaterally; No rales, rhonchi, wheezing, or rubs  HEART: Regular rate and rhythm; No murmurs, rubs, or gallops  ABDOMEN: Soft, Nontender, Nondistended; Bowel sounds present  EXTREMITIES:   No clubbing, cyanosis, or edema  LYMPH: No lymphadenopathy noted  SKIN: No rashes or lesions    labs  10-03    137  |  106  |  18  ----------------------------<  90  4.3   |  23  |  1.2    Ca    9.9      03 Oct 2022 05:19  Phos  2.4     10-02  Mg     2.1     10-03    TPro  6.7  /  Alb  4.4  /  TBili  0.6  /  DBili  x   /  AST  19  /  ALT  14  /  AlkPhos  78  10-03                          15.1   9.16  )-----------( 195      ( 03 Oct 2022 05:19 )             44.7               acetaminophen     Tablet .. 650 milliGRAM(s) Oral every 6 hours PRN  aspirin  chewable 81 milliGRAM(s) Oral daily  atorvastatin 80 milliGRAM(s) Oral at bedtime  chlorhexidine 2% Cloths 1 Application(s) Topical <User Schedule>  clonazePAM  Tablet 1 milliGRAM(s) Oral two times a day PRN  clopidogrel Tablet 75 milliGRAM(s) Oral daily  lithium 300 milliGRAM(s) Oral at bedtime  metoprolol tartrate 25 milliGRAM(s) Oral two times a day  pantoprazole    Tablet 40 milliGRAM(s) Oral before breakfast  sertraline 150 milliGRAM(s) Oral daily

## 2022-10-03 NOTE — PROGRESS NOTE ADULT - ASSESSMENT
71 yo M PMH Bipolar and anxiety was admitted to the unit  with chest pain and ruled in for     NSTEMI     - aspirin, Plavix, Lipitor, Lovenox (held today for procedure), Lopressor    - continue lithium and sertraline    -  clonazepam prn   - NPO  for cardiac cath today   - gentle hydration 
73 yo M PMH Bipolar, Depression. Presents to ED with chest pain. Patient states that chest pain first started about 1 week ago. At this time pain was left sided, dull, 4-5/10,  worse with ambulation, intermittent lasting for about 30 minutes at a time. Patient said he awoke this evening with more sharp L sided chest pain 7/10 that lasted for about 15 minutes. While in ED has had intermittent episodes of pain which resolve spontaneously. Denies SOB at rest, fever, chills, cough, N/V/D, dizziness, weakness, and any other acute complaints at this time. Patient do report exertional SOB when going up the stairs. Found to have NSTEMI    Chest pain secondary to NSTEMI  Cardiology following  Aspirin 81 PO daily  Lipitor 80 mg PO daily,  Plavix 75 Po daily  Continue with therapeutic Lovenox  Metoprolol 12.5 mg BID per cardiology  TTE  NPO after midnight on Clement 10/2/22 for possible LHC on Monday 10/3/22  Management per critical care team  
VITA CORREA is a 72y man with a medical history significant for Bipolar disorder who presented initially with several days of dull chest pain, and is now in the critical care unit for NSTEMI.     Impression    NSTEMI, unlikely demand  Anxiety  Chest pain  CKD stage 3      Plan:      CNS:  Continue with Lithium, anxiolytics PRN    HEENT: Oral care    CARDIOVASCULAR: NSTEMI management per cardiology, received DAPT, AC w/ Enoxaparin.  continuous telemetry monitoring.  High dose statin.  Follow-up cardiology, plan is for cath monday.    PULMONARY: HOB @ 45 degrees, aspiration precautions    GASTROINTESTINAL: NPO after midnight for pending cath.  Bowel regimen PRN    GENITOURINARY/RENAL  [place]: no  Monitor I&O    INFECTIOUS: no active concerns    HEMATOLOGIC  DVT ppx: full AC    ENDOCRINE  Follow up FS.  Insulin protocol as needed.  BG goal 140-180    MSK/DERM  PT/OT      CODE STATUS: FULL  SDU
# Chest pain, r/o NSTEMI  - ECG showed TWI in lateral leads  - Trop 0.68 -> 0.43  - CKMB 17 -> 11.2  - On ASA and Plavix  - Cholesterol 221  - C/w statin  - AC with lovenox (held this morning for cath)  - Cath today at [facility]    # Anxiety  # Depression  # Bipolar disorder  - C/w sertraline  - C/w lithium    DVT ppx: LVX (held for cath today)  GI ppx: PTX  Diet: NPO for cath  FULL CODE
Patient with chest pain relieved with belching. EKG pend. ASA BET statin. Will need echo. Possible cardiac cath today. 
73 yo M PMH Tonia was admitted to the unit  with chest pain.     NSTEMI     - aspirin, Plavix, Lipitor, Lovenox, Lopressor    - continue psych meds   - NPO after midnight for cardiac cath in am   - hold am Lovenox 
71 yo M PMH Bipolar, Depression and PTSD, presented to the ED for eval of chest pain.    Impression:  #Chest pain: NSTEMI    Still c/o CP at times but improved  Trop trending down 0.68-->0.43  CKMB trending down 17-->11  ECG: NS, TWI on lateral leads  ECHO(12/16/21): EF 60-65%, Mod LVH, Grade 1 diastolic dysfunction      Plan:  Serial EKG PRN for chest pain to assess for ST changes  C/w Aspirin 81 PO daily, Lipitor 80 mg PO daily, Plavix 75 Po daily  Continue with therapeutic Lovenox. Hold am dose in anticipation for possible LHC on Monday 10/3  C/w Metroprolol 25mg PO BID  Echo pending  Cxr: No radiographic evidence of acute cardiopulmonary disease  NPO after midnight on Clement 10/2/22 for possible LHC on Monday 10/3/22  Monitor skye

## 2022-10-03 NOTE — PROGRESS NOTE ADULT - SUBJECTIVE AND OBJECTIVE BOX
VITA CORREA 72y Male  MRN#: [id number]   CODE STATUS: FULL    SUBJECTIVE  Patient is a 72y old Male who presents with a chief complaint of NSTEMI (03 Oct 2022 08:33)  Currently admitted to medicine with the primary diagnosis of NSTEMI (non-ST elevation myocardial infarction)      Today is hospital day 2d, and this morning he is resting well. Pt reports feeling anxious about his cath today and reports some left sided chest pain but says it is better than when he came in.     OBJECTIVE  PAST MEDICAL & SURGICAL HISTORY  Stage 3 chronic kidney disease    Bipolar disorder in full remission, most recent episode unspecified type    Anxiety    Post traumatic stress disorder (PTSD)      ALLERGIES:  amoxicillin (Angioedema)    MEDICATIONS:  STANDING MEDICATIONS  aspirin  chewable 81 milliGRAM(s) Oral daily  atorvastatin 80 milliGRAM(s) Oral at bedtime  chlorhexidine 2% Cloths 1 Application(s) Topical <User Schedule>  clopidogrel Tablet 75 milliGRAM(s) Oral daily  lithium 300 milliGRAM(s) Oral at bedtime  metoprolol tartrate 25 milliGRAM(s) Oral two times a day  pantoprazole    Tablet 40 milliGRAM(s) Oral before breakfast  sertraline 150 milliGRAM(s) Oral daily  sodium chloride 0.9%. 1000 milliLiter(s) IV Continuous <Continuous>    PRN MEDICATIONS  acetaminophen     Tablet .. 650 milliGRAM(s) Oral every 6 hours PRN  clonazePAM  Tablet 1 milliGRAM(s) Oral two times a day PRN      VITAL SIGNS: Last 24 Hours  T(C): 36.2 (03 Oct 2022 07:07), Max: 36.2 (03 Oct 2022 07:07)  T(F): 97.2 (03 Oct 2022 07:07), Max: 97.2 (03 Oct 2022 07:07)  HR: 60 (03 Oct 2022 07:05) (53 - 60)  BP: 156/86 (03 Oct 2022 07:05) (128/78 - 172/97)  BP(mean): 115 (03 Oct 2022 07:05) (98 - 128)  RR: 28 (03 Oct 2022 07:07) (14 - 28)  SpO2: 97% (03 Oct 2022 07:05) (95% - 97%)    LABS:                        15.1   9.16  )-----------( 195      ( 03 Oct 2022 05:19 )             44.7     10-03    137  |  106  |  18  ----------------------------<  90  4.3   |  23  |  1.2    Ca    9.9      03 Oct 2022 05:19  Phos  2.4     10-02  Mg     2.1     10-03    TPro  6.7  /  Alb  4.4  /  TBili  0.6  /  DBili  x   /  AST  19  /  ALT  14  /  AlkPhos  78  10-03      CARDIAC MARKERS ( 01 Oct 2022 11:43 )  x     / 0.43 ng/mL / x     / x     / 11.2 ng/mL      PHYSICAL EXAM:    GENERAL: NAD, anxious affect  HEENT:  Atraumatic, Normocephalic  PULMONARY: Clear to auscultation bilaterally; No wheeze  CARDIOVASCULAR: Regular rate and rhythm; No murmurs, rubs, or gallops  GASTROINTESTINAL: Soft, Nontender, Nondistended; Bowel sounds present  MUSCULOSKELETAL:  2+ Peripheral Pulses, No clubbing, cyanosis, or edema  NEUROLOGY: non-focal  SKIN: No rashes or lesions

## 2022-10-03 NOTE — CHART NOTE - NSCHARTNOTEFT_GEN_A_CORE
PRE-OP DIAGNOSIS:  NSTEMI    PROCEDURE:   [ ] Coronary Angiogram   [x ] LHC   [ ] LVG   [ ] RHC   [x ] Intervention (see below)       PHYSICIAN:  Dr. Carrera  INTERVENTIONAL FELLOW: Dr. Mullins  FELLOW: Ro    PROCEDURE DESCRIPTION:     Consent:    [x] Patient   [] Family Member   []  Used      Anesthesia:   [ ] General   [X] Sedation   [X] Local     Access & Closure:   [ x]6 Fr Radial Artery  -> D-stat   [ ] Fr Femoral Artery ->   [ ] Fr Femoral Vein ->  [ ] Fr Brachial Vein ->     IV Contrast: 85 mL      Intervention:     Successful PCI of  pRCA with balloon angioplasty s/p JERRI x2    Implants:     CEE XLV 4.29S63yj to pRCA  CEE XLV 4.15M01bh to pRCA    AUC: 8, 8     FINDINGS:   Coronary Dominance: right  LM: mild disease  LAD: moderate diffuse disease   CX:  80% mLCX  RCA:  90% pRCA thrombotic stenosis.     LVEDP:  13 mmHg     EF:  60-65%      ESTIMATED BLOOD LOSS: < 10 mL      CONDITION:   [x] Good   [ ] Fair   [ ] Critical     SPECIMEN REMOVED: N/A     POST-OP DIAGNOSIS:    [ ] Normal Coronary Angiogram   [ ] Mild Coronary Artery Disease (< 50% stenosis)   [ x] 2-Vessel Coronary Artery Disease      PLAN OF CARE:   [ ] D/C Home Today   [ x] Return to In-patient bed   [ ] Admit for observation   [ x] Return for Staged Procedure in 4-6 weeks to LCx  [ ] CT Surgery Consult   [X] Medications: ASA,  statin, brilinta  [X] IV Fluids: NS @ 50cc/h x 6 hours  [ ] EP Consult  [x] Remove D-stat  and hold manual pressure if signs of hematoma or bleeding  [x] Smoking cessation PRE-OP DIAGNOSIS:  NSTEMI    PROCEDURE:   [ ] Coronary Angiogram   [x ] LHC   [ ] LVG   [ ] RHC   [x ] Intervention (see below)       PHYSICIAN:  Dr. Carrera  INTERVENTIONAL FELLOW: Dr. Mullins  FELLOW: Ro    PROCEDURE DESCRIPTION:     Consent:    [x] Patient   [] Family Member   []  Used      Anesthesia:   [ ] General   [X] Sedation   [X] Local     Access & Closure:   [ x]6 Fr Radial Artery  -> D-stat   [ ] Fr Femoral Artery ->   [ ] Fr Femoral Vein ->  [ ] Fr Brachial Vein ->     IV Contrast: 85 mL      Intervention:     Successful PCI of  pRCA with balloon angioplasty s/p JERRI x2    Implants:     CEE XLV 4.04T85eq to pRCA  CEE XLV 4.18N63gl to pRCA    AUC: 8, 8     FINDINGS:   Coronary Dominance: right  LM: mild disease  LAD: moderate diffuse disease   CX:  95% mLCX  RCA:  90% pRCA thrombotic stenosis.     LVEDP:  13 mmHg     EF:  60-65%      ESTIMATED BLOOD LOSS: < 10 mL      CONDITION:   [x] Good   [ ] Fair   [ ] Critical     SPECIMEN REMOVED: N/A     POST-OP DIAGNOSIS:    [ ] Normal Coronary Angiogram   [ ] Mild Coronary Artery Disease (< 50% stenosis)   [ x] 2-Vessel Coronary Artery Disease      PLAN OF CARE:   [ ] D/C Home Today   [ x] Return to In-patient bed   [ ] Admit for observation   [ x] Return for Staged Procedure in 4-6 weeks to LCx  [ ] CT Surgery Consult   [X] Medications: ASA,  statin, brilinta  [X] IV Fluids: NS @ 50cc/h x 6 hours  [ ] EP Consult  [x] Remove D-stat  and hold manual pressure if signs of hematoma or bleeding  [x] Smoking cessation

## 2022-10-03 NOTE — PROGRESS NOTE ADULT - SUBJECTIVE AND OBJECTIVE BOX
Patient is feeling anxious this am about procedure        T(F): 97.2 (10-03-22 @ 07:07), Max: 97.2 (10-03-22 @ 07:07)  HR: 60 (10-03-22 @ 07:05)  BP: 156/86 (10-03-22 @ 07:05)  RR: 20  SpO2: 97% (10-03-22 @ 07:05) (95% - 97%)    PHYSICAL EXAM:  GENERAL: NAD  HEAD:  Atraumatic, Normocephalic  EYES: EOMI, PERRLA, conjunctiva and sclera clear  NERVOUS SYSTEM:  Alert & Oriented X3, no focal deficits   CHEST/LUNG: Clear to percussion bilaterally; No rales, rhonchi, wheezing, or rubs  HEART: Regular rate and rhythm; No murmurs, rubs, or gallops  ABDOMEN: Soft, Nontender, Nondistended; Bowel sounds present  EXTREMITIES:  2+ Peripheral Pulses, No clubbing, cyanosis, or edema    LABS  10-03    137  |  106  |  18  ----------------------------<  90  4.3   |  23  |  1.2    Ca    9.9      03 Oct 2022 05:19  Phos  2.4     10-02  Mg     2.1     10-03    TPro  6.7  /  Alb  4.4  /  TBili  0.6  /  DBili  x   /  AST  19  /  ALT  14  /  AlkPhos  78  10-03                          15.1   9.16  )-----------( 195      ( 03 Oct 2022 05:19 )             44.7         CARDIAC ENZYMES    CKMB Units: 11.2 (10-01 @ 11:43)  CKMB Units: 17.0 (10-01 @ 02:01)    Troponin T, Serum: 0.43 ng/mL (10-01-22 @ 11:43)  Troponin T, Serum: 0.68 ng/mL (10-01-22 @ 02:01)      RADIOLOGY  < from: Xray Chest 1 View- PORTABLE-Urgent (Xray Chest 1 View- PORTABLE-Urgent .) (10.01.22 @ 01:26) >  Impression:    No radiographic evidence of acute cardiopulmonary disease.      < end of copied text >    MEDICATIONS  (STANDING):  aspirin  chewable 81 milliGRAM(s) Oral daily  atorvastatin 80 milliGRAM(s) Oral at bedtime  chlorhexidine 2% Cloths 1 Application(s) Topical <User Schedule>  clopidogrel Tablet 75 milliGRAM(s) Oral daily  lithium 300 milliGRAM(s) Oral at bedtime  metoprolol tartrate 25 milliGRAM(s) Oral two times a day  pantoprazole    Tablet 40 milliGRAM(s) Oral before breakfast  sertraline 150 milliGRAM(s) Oral daily    MEDICATIONS  (PRN):  acetaminophen     Tablet .. 650 milliGRAM(s) Oral every 6 hours PRN Temp greater or equal to 38C (100.4F), Severe Pain (7 - 10)  clonazePAM  Tablet 1 milliGRAM(s) Oral two times a day PRN Anxiety

## 2022-10-04 ENCOUNTER — TRANSCRIPTION ENCOUNTER (OUTPATIENT)
Age: 72
End: 2022-10-04

## 2022-10-04 VITALS
TEMPERATURE: 98 F | RESPIRATION RATE: 18 BRPM | SYSTOLIC BLOOD PRESSURE: 155 MMHG | HEART RATE: 66 BPM | DIASTOLIC BLOOD PRESSURE: 81 MMHG

## 2022-10-04 LAB
ANION GAP SERPL CALC-SCNC: 13 MMOL/L — SIGNIFICANT CHANGE UP (ref 7–14)
BUN SERPL-MCNC: 14 MG/DL — SIGNIFICANT CHANGE UP (ref 10–20)
CALCIUM SERPL-MCNC: 9.6 MG/DL — SIGNIFICANT CHANGE UP (ref 8.4–10.5)
CHLORIDE SERPL-SCNC: 112 MMOL/L — HIGH (ref 98–110)
CO2 SERPL-SCNC: 17 MMOL/L — SIGNIFICANT CHANGE UP (ref 17–32)
CREAT SERPL-MCNC: 1.2 MG/DL — SIGNIFICANT CHANGE UP (ref 0.7–1.5)
EGFR: 64 ML/MIN/1.73M2 — SIGNIFICANT CHANGE UP
GLUCOSE SERPL-MCNC: 94 MG/DL — SIGNIFICANT CHANGE UP (ref 70–99)
HCT VFR BLD CALC: 43.2 % — SIGNIFICANT CHANGE UP (ref 42–52)
HGB BLD-MCNC: 15.5 G/DL — SIGNIFICANT CHANGE UP (ref 14–18)
MCHC RBC-ENTMCNC: 34.4 PG — HIGH (ref 27–31)
MCHC RBC-ENTMCNC: 35.9 G/DL — SIGNIFICANT CHANGE UP (ref 32–37)
MCV RBC AUTO: 95.8 FL — HIGH (ref 80–94)
NRBC # BLD: 0 /100 WBCS — SIGNIFICANT CHANGE UP (ref 0–0)
PLATELET # BLD AUTO: 195 K/UL — SIGNIFICANT CHANGE UP (ref 130–400)
POTASSIUM SERPL-MCNC: 3.8 MMOL/L — SIGNIFICANT CHANGE UP (ref 3.5–5)
POTASSIUM SERPL-SCNC: 3.8 MMOL/L — SIGNIFICANT CHANGE UP (ref 3.5–5)
RBC # BLD: 4.51 M/UL — LOW (ref 4.7–6.1)
RBC # FLD: 13.7 % — SIGNIFICANT CHANGE UP (ref 11.5–14.5)
SODIUM SERPL-SCNC: 142 MMOL/L — SIGNIFICANT CHANGE UP (ref 135–146)
WBC # BLD: 11.29 K/UL — HIGH (ref 4.8–10.8)
WBC # FLD AUTO: 11.29 K/UL — HIGH (ref 4.8–10.8)

## 2022-10-04 PROCEDURE — 99238 HOSP IP/OBS DSCHRG MGMT 30/<: CPT

## 2022-10-04 PROCEDURE — 93306 TTE W/DOPPLER COMPLETE: CPT | Mod: 26

## 2022-10-04 PROCEDURE — 93010 ELECTROCARDIOGRAM REPORT: CPT

## 2022-10-04 RX ORDER — ATORVASTATIN CALCIUM 80 MG/1
1 TABLET, FILM COATED ORAL
Qty: 60 | Refills: 0
Start: 2022-10-04 | End: 2022-12-02

## 2022-10-04 RX ORDER — ATORVASTATIN CALCIUM 80 MG/1
1 TABLET, FILM COATED ORAL
Qty: 60 | Refills: 0
Start: 2022-10-04 | End: 2023-03-05

## 2022-10-04 RX ORDER — CLOPIDOGREL BISULFATE 75 MG/1
300 TABLET, FILM COATED ORAL ONCE
Refills: 0 | Status: COMPLETED | OUTPATIENT
Start: 2022-10-04 | End: 2022-10-04

## 2022-10-04 RX ORDER — ATORVASTATIN CALCIUM 80 MG/1
1 TABLET, FILM COATED ORAL
Qty: 0 | Refills: 0 | DISCHARGE
Start: 2022-10-04

## 2022-10-04 RX ORDER — CLOPIDOGREL BISULFATE 75 MG/1
1 TABLET, FILM COATED ORAL
Qty: 30 | Refills: 3
Start: 2022-10-04 | End: 2023-01-31

## 2022-10-04 RX ORDER — ASPIRIN/CALCIUM CARB/MAGNESIUM 324 MG
1 TABLET ORAL
Qty: 0 | Refills: 0 | DISCHARGE
Start: 2022-10-04

## 2022-10-04 RX ORDER — METOPROLOL TARTRATE 50 MG
1 TABLET ORAL
Qty: 120 | Refills: 0
Start: 2022-10-04 | End: 2022-12-02

## 2022-10-04 RX ORDER — PANTOPRAZOLE SODIUM 20 MG/1
1 TABLET, DELAYED RELEASE ORAL
Qty: 0 | Refills: 0 | DISCHARGE
Start: 2022-10-04

## 2022-10-04 RX ORDER — PANTOPRAZOLE SODIUM 20 MG/1
1 TABLET, DELAYED RELEASE ORAL
Qty: 60 | Refills: 0
Start: 2022-10-04 | End: 2022-12-02

## 2022-10-04 RX ORDER — METOPROLOL TARTRATE 50 MG
1 TABLET ORAL
Qty: 0 | Refills: 0 | DISCHARGE
Start: 2022-10-04

## 2022-10-04 RX ORDER — CLOPIDOGREL BISULFATE 75 MG/1
75 TABLET, FILM COATED ORAL DAILY
Refills: 0 | Status: DISCONTINUED | OUTPATIENT
Start: 2022-10-05 | End: 2022-10-04

## 2022-10-04 RX ORDER — ASPIRIN/CALCIUM CARB/MAGNESIUM 324 MG
1 TABLET ORAL
Qty: 60 | Refills: 0
Start: 2022-10-04 | End: 2022-12-02

## 2022-10-04 RX ADMIN — CHLORHEXIDINE GLUCONATE 1 APPLICATION(S): 213 SOLUTION TOPICAL at 06:14

## 2022-10-04 RX ADMIN — CLOPIDOGREL BISULFATE 300 MILLIGRAM(S): 75 TABLET, FILM COATED ORAL at 11:38

## 2022-10-04 RX ADMIN — PANTOPRAZOLE SODIUM 40 MILLIGRAM(S): 20 TABLET, DELAYED RELEASE ORAL at 06:15

## 2022-10-04 RX ADMIN — Medication 81 MILLIGRAM(S): at 11:38

## 2022-10-04 RX ADMIN — SERTRALINE 150 MILLIGRAM(S): 25 TABLET, FILM COATED ORAL at 11:39

## 2022-10-04 RX ADMIN — TICAGRELOR 90 MILLIGRAM(S): 90 TABLET ORAL at 08:51

## 2022-10-04 RX ADMIN — Medication 25 MILLIGRAM(S): at 06:15

## 2022-10-04 NOTE — PROGRESS NOTE ADULT - SUBJECTIVE AND OBJECTIVE BOX
Cardiology Follow up s/p PCI VITA LEDBETTER   72y Male  PAST MEDICAL & SURGICAL HISTORY:    Stage 3 chronic kidney disease    Bipolar disorder in full remission, most recent episode unspecified type    Anxiety    Post traumatic stress disorder (PTSD)           HPI:  73 yo M PMH Bipolar, Depression. Presents to ED with chest pain. Patient states that chest pain first started about 1 week ago.   At this time pain was left sided, dull, 4-5/10,  worse with ambulation, intermittent lasting for about 30 minutes at a time.   Patient said he awoke this evening with more sharp L sided chest pain 7/10 that lasted for about 15 minutes.   While in ED has had intermittent episodes of pain which resolve spontaneously.   Denies SOB at rest, fever, chills, cough, N/V/D, dizziness, weakness, and any other acute complaints at this time.     Patient do report exertional SOB when going up the stairs     (01 Oct 2022 03:48)    Allergies    amoxicillin (Angioedema)    Intolerances    Patient seen and examined at bedside. No acute events overnight.  Patient without complaints. Pt ambulated without issues/symptoms  Denies CP, SOB, palpitations, or dizziness  No events on telemetry overnight    Vital Signs Last 24 Hrs  T(C): 36.9 (04 Oct 2022 04:57), Max: 36.9 (04 Oct 2022 04:57)  T(F): 98.5 (04 Oct 2022 04:57), Max: 98.5 (04 Oct 2022 04:57)  HR: 64 (04 Oct 2022 04:57) (62 - 64)  BP: 132/75 (04 Oct 2022 04:57) (132/75 - 139/76)  BP(mean): --  RR: --  SpO2: --        MEDICATIONS  (STANDING):  aspirin  chewable 81 milliGRAM(s) Oral daily  atorvastatin 80 milliGRAM(s) Oral at bedtime  chlorhexidine 2% Cloths 1 Application(s) Topical <User Schedule>  clopidogrel Tablet 300 milliGRAM(s) Oral once  lithium 300 milliGRAM(s) Oral at bedtime  metoprolol tartrate 25 milliGRAM(s) Oral two times a day  pantoprazole    Tablet 40 milliGRAM(s) Oral before breakfast  sertraline 150 milliGRAM(s) Oral daily  sodium chloride 0.9%. 1000 milliLiter(s) (75 mL/Hr) IV Continuous <Continuous>    MEDICATIONS  (PRN):  acetaminophen     Tablet .. 650 milliGRAM(s) Oral every 6 hours PRN Temp greater or equal to 38C (100.4F), Severe Pain (7 - 10)  clonazePAM  Tablet 1 milliGRAM(s) Oral two times a day PRN Anxiety      REVIEW OF SYSTEMS:          All negative except as mentioned in HPI    PHYSICAL EXAM:           CONSTITUTIONAL: Well-developed; well-nourished; in no acute distress  	SKIN: warm, dry  	HEAD: Normocephalic; atraumatic  	EYES: PERRL.  	ENT: No nasal discharge, airway clear, mucous membranes moist  	NECK: Supple; non tender.  	CARD: +S1, +S2, no murmurs, gallops, or rubs. Regular rate and rhythm    	RESP: No wheezes, rales or rhonchi. CTA B/L  	ABD: soft ntnd, + BS x 4 quadrants  	EXT: moves all extremities,  no clubbing, cyanosis or edema  	NEURO: Alert and oriented x3, no focal deficits          PSYCH: Cooperative, appropriate          VASCULAR:  + Rad / + PTs / +  DPs          EXTREMITY:               Right Radial: pressure dressing removed, access site slightly swollen - soft, no hematoma, local area of ecchymosis noted, no pain, + pulses, no sign of infection, no numbness            ECG:   < from: 12 Lead ECG (10.03.22 @ 07:52) >    Ventricular Rate 57 BPM    Atrial Rate 57 BPM    P-R Interval 162 ms    QRS Duration 132 ms    Q-T Interval 412 ms    QTC Calculation(Bazett) 401 ms    P Axis 75 degrees    R Axis -7 degrees    T Axis -45 degrees    Diagnosis Line Sinus bradycardia  Right bundle branch block  T wave abnormality, consider lateral ischemia  Abnormal ECG    Confirmed by TAMIA QUEVEDO MD ([id number]) on 10/4/2022 9:10:02 AM                                                                                   2D ECHO:  P      LABS:                        15.1   9.16  )-----------( 195      ( 03 Oct 2022 05:19 )             44.7     10-03    137  |  106  |  18  ----------------------------<  90  4.3   |  23  |  1.2    Ca    9.9      03 Oct 2022 05:19  Mg     2.1     10-03    TPro  6.7  /  Alb  4.4  /  TBili  0.6  /  DBili  x   /  AST  19  /  ALT  14  /  AlkPhos  78  10-03      LIVER FUNCTIONS - ( 03 Oct 2022 05:19 )  Alb: 4.4 g/dL / Pro: 6.7 g/dL / ALK PHOS: 78 U/L / ALT: 14 U/L / AST: 19 U/L / GGT: x           LDL Cholesterol Calculated: 144 mg/dL (10.02.22 @ 05:21)   A1C with Estimated Average Glucose Result: 4.8      A/P:  I discussed the case with Cardiologist Dr. Carrera and recommend the following:    S/P PCI:   IV Contrast: 85 mL      Intervention:     Successful PCI of  pRCA with balloon angioplasty s/p JERRI x2    Implants:     CEE XLV 4.93Y02pc to pRCA  CEE XLV 4.20K82vn to pRCA    AUC: 8, 8     FINDINGS:   Coronary Dominance: right  LM: mild disease  LAD: moderate diffuse disease   CX:  95% mLCX  RCA:  90% pRCA thrombotic stenosis.     LVEDP:  13 mmHg                           f/u CBC/BMP at 11 am                   f/u 2D Echo results                    No ACEi/ARB, EF NL, No DM, will evaluate as OP                   D/C Brilinta due to spells of SOB                   Give Plavix 300 mg POx1 now                   Start Plavix 75 mg PO Daily on 10/5/22  	     Continue DAPT (  Aspirin 81 mg daily and Plavix 75 mg daily ), B-Blocker, Statin Therapy                   Patient given 30 day supply of ( Aspirin 81 mg daily and Plavix 75 mg daily ) to take at home                   Ambulate patient around the unit                      Patient agreeing to take DAPT for at least one year or as directed by cardiologist                    Pt given instructions on importance of taking antiplatelet medication or risk acute stent thrombosis/death                   Post cath instructions, access site care and activity restrictions reviewed with patient                     Discussed with patient to return to hospital if experience chest pain, shortness breath, dizziness and site bleeding                   Aggressive risk factor modification, diet counseling, smoking cessation discussed with patient                       Can discharge patient from cardiac standpoint after ambulating without symptoms and access site wnl, Echo and blood work reviewed                    Benefits of Cardiac Rehab discussed with patient, All documents sent to [facility]. Patient instructed to call and make first                               appointment after first f/u visit with Cardiologist                    Return for Staged Procedure in 4-6 weeks to LCx                   Follow up with Cardiology Dr. Carrera in two weeks.  Instructed to call and make an appointment                      Discharge instructions as follows, when ready to d/c:                   - Continue medical regimen as prescribed to prevent chest pain                   - Continue dual anti-platelet therapy, beta blocker, statin                   - If you are diabetic and taking medication containing Metformin, do not take them for 48 hours after the procedure                   - Instructed to call 911 if chest pain, shortness of breath or bleeding from access site                   - No heavy lifting >10lbs x 1 week                   - No driving x 24 hours                   - No baths, swimming pools x 1 week, may shower                   - Low sodium low fat low cholesterol diet                   - Follow-up with Cardiologist in 2 weeks after discharge

## 2022-10-04 NOTE — DISCHARGE NOTE PROVIDER - NSDCHC_MEDRECSTATUS_GEN_ALL_CORE
Admission Reconciliation is Completed  Discharge Reconciliation is Not Complete Admission Reconciliation is Completed  Discharge Reconciliation is Completed IV intact/Arm band on

## 2022-10-04 NOTE — DISCHARGE NOTE NURSING/CASE MANAGEMENT/SOCIAL WORK - PATIENT PORTAL LINK FT
You can access the FollowMyHealth Patient Portal offered by [facility] by registering at the following website: [email or web address]. By joining [facility]’s FollowMyHealth portal, you will also be able to view your health information using other applications (apps) compatible with our system.

## 2022-10-04 NOTE — DISCHARGE NOTE PROVIDER - NSDCCPCAREPLAN_GEN_ALL_CORE_FT
PRINCIPAL DISCHARGE DIAGNOSIS  Diagnosis: NSTEMI (non-ST elevation myocardial infarction)  Assessment and Plan of Treatment: Coronary artery disease (CAD) is narrowing of the arteries to your heart caused by a buildup of plaque. Plaque is made up of cholesterol and other substances. The narrowing in your arteries decreases the amount of blood that can flow to your heart. This causes your heart to get less oxygen.   You were admitted for non ST elevation myocardial infarction. Two stents were placed. You need to have another stent to be placed in 4-6 weeks. Follow up with cardiology.  Contact your healthcare provider if:  - You have any of the following signs of a heart attack:   Squeezing, pressure, or pain in your chest   and any of the following:   Discomfort or pain in your back, neck, jaw, stomach, or arm  - Shortness of breath  - Nausea or vomiting  -   Lightheadedness or a sudden cold sweat  - You have chest pain that is more frequent, or you have chest pain at rest  - You have questions or concerns about your condition or care.  Cholesterol medicines help lower blood cholesterol levels.   Nitrates, such as nitroglycerin, relax the arteries of your heart so it gets more oxygen. They help to relieve your chest pain.   Antiplatelets, such as aspirin, and Plavix (clopidogrel) help prevent blood clots. Take your antiplatelet medicine exactly as directed. These medications make it more likely for you to bleed or bruise. If you are told to take aspirin, do not take acetaminophen or ibuprofen instead.   Blood thinners help prevent blood clots. Clots can cause strokes, heart attacks, and death. The following are general safety guidelines to follow while you are taking a blood thinner:  Watch for bleeding and bruising while you take blood thinners. Watch for bleeding from your gums or nose, your urine and bowel movements. If you shave, use an electric shaver. Do not play contact sports.   Do not start or stop any medicines unless your healthcare provider tells you to.   Do not smoke. Nicotine and other chemicals in cigarettes and cigars can cause heart and lung damage.       PRINCIPAL DISCHARGE DIAGNOSIS  Diagnosis: NSTEMI (non-ST elevation myocardial infarction)  Assessment and Plan of Treatment: Coronary artery disease (CAD) is narrowing of the arteries to your heart caused by a buildup of plaque. Plaque is made up of cholesterol and other substances. The narrowing in your arteries decreases the amount of blood that can flow to your heart. This causes your heart to get less oxygen.   You were admitted for non ST elevation myocardial infarction. Two stents were placed. You need to have another stent to be placed in 4-6 weeks. Follow up with cardiology.  Contact your healthcare provider if:  - You have any of the following signs of a heart attack:   Squeezing, pressure, or pain in your chest   and any of the following:   Discomfort or pain in your back, neck, jaw, stomach, or arm  - Shortness of breath  - Nausea or vomiting  -    - Continue medical regimen as prescribed to prevent chest pain                   - Continue dual anti-platelet therapy, beta blocker, statin                   - If you are diabetic and taking medication containing Metformin, do not take them for 48 hours after the procedure                   - Instructed to call 911 if chest pain, shortness of breath or bleeding from access site                   - No heavy lifting >10lbs x 1 week                   - No driving x 24 hours                   - No baths, swimming pools x 1 week, may shower                   - Low sodium low fat low cholesterol diet                   - Follow-up with Cardiologist in 2 weeks after discharge

## 2022-10-04 NOTE — DISCHARGE NOTE PROVIDER - HOSPITAL COURSE
HPI:  71 yo M PMH Bipolar, Depression. Presents to ED with chest pain. Patient states that chest pain first started about 1 week ago.   At this time pain was left sided, dull, 4-5/10,  worse with ambulation, intermittent lasting for about 30 minutes at a time.   Patient said he awoke this evening with more sharp L sided chest pain 7/10 that lasted for about 15 minutes.   While in ED has had intermittent episodes of pain which resolve spontaneously.   Denies SOB at rest, fever, chills, cough, N/V/D, dizziness, weakness, and any other acute complaints at this time.     Patient do report exertional SOB when going up the stairs      Patient admitted for a diagnosis of NSTEMI sp cath on 10/03/2022    Intervention: PCI of  pRCA with balloon angioplasty s/p JERRI x2    Implants:   CEE XLV 4.13X04xx to pRCA  CEE XLV 4.47E23gp to pRCA     FINDINGS:   Coronary Dominance: right  LM: mild disease  LAD: moderate diffuse disease   CX:  95% mLCX  RCA:  90% pRCA thrombotic stenosis.     EF:  60-65%   2-Vessel Coronary Artery Disease      PLAN  - Return for Staged Procedure in 4-6 weeks to LCx  Medications: ASA,  statin, brilinta       HPI:  71 yo M PMH Bipolar, Depression. Presents to ED with chest pain. Patient states that chest pain first started about 1 week ago.   At this time pain was left sided, dull, 4-5/10,  worse with ambulation, intermittent lasting for about 30 minutes at a time.   Patient said he awoke this evening with more sharp L sided chest pain 7/10 that lasted for about 15 minutes.   While in ED has had intermittent episodes of pain which resolve spontaneously.   Denies SOB at rest, fever, chills, cough, N/V/D, dizziness, weakness, and any other acute complaints at this time.     Patient do report exertional SOB when going up the stairs      Patient admitted for a diagnosis of NSTEMI sp cath on 10/03/2022    Intervention: PCI of  pRCA with balloon angioplasty s/p JERRI x2    Implants:   CEE XLV 4.44L50st to pRCA  CEE XLV 4.78X05nh to pRCA     FINDINGS:   Coronary Dominance: right  LM: mild disease  LAD: moderate diffuse disease   CX:  95% mLCX  RCA:  90% pRCA thrombotic stenosis.     EF:  60-65%   2-Vessel Coronary Artery Disease      PLAN  - Return for Staged Procedure in 4-6 weeks to LCx  Medications: ASA,  statin, plavix  Follow up with cardiology in 2 weeks

## 2022-10-04 NOTE — DISCHARGE NOTE PROVIDER - NSDCMRMEDTOKEN_GEN_ALL_CORE_FT
aspirin 81 mg oral tablet, chewable: 1 tab(s) orally once a day  atorvastatin 80 mg oral tablet: 1 tab(s) orally once a day (at bedtime)  clopidogrel 75 mg oral tablet: 1 tab(s) orally once a day MDD:1  KlonoPIN 1 mg oral tablet: 1 tab(s) orally 2 times a day  lithium 300 mg oral tablet: 2 tab(s) orally once a day (at bedtime)   pantoprazole 40 mg oral delayed release tablet: 1 tab(s) orally once a day (before a meal)  Zoloft: 150 milligram(s) orally once a day   aspirin 81 mg oral tablet, chewable: 1 tab(s) orally once a day  atorvastatin 80 mg oral tablet: 1 tab(s) orally once a day (at bedtime)  clopidogrel 75 mg oral tablet: 1 tab(s) orally once a day MDD:1  KlonoPIN 1 mg oral tablet: 1 tab(s) orally 2 times a day  lithium 300 mg oral tablet: 2 tab(s) orally once a day (at bedtime)   metoprolol tartrate 25 mg oral tablet: 1 tab(s) orally 2 times a day  pantoprazole 40 mg oral delayed release tablet: 1 tab(s) orally once a day (before a meal)  Zoloft: 150 milligram(s) orally once a day

## 2022-10-04 NOTE — DISCHARGE NOTE PROVIDER - CARE PROVIDER_API CALL
Jakob Lindquist)  Cardiovascular Disease; Internal Medicine  [address] [place] [address]  Phone: [phone]  Fax: [phone]  Follow Up Time: 1 week   Vivek Carrera)  Cardiovascular Disease; Internal Medicine; Interventional Cardiology; Nuclear Cardiology  [address] [place] [address], [place] [address]  Phone: [phone]  Fax: [phone]  Follow Up Time: 2 weeks

## 2022-10-04 NOTE — DISCHARGE NOTE PROVIDER - PROVIDER TOKENS
PROVIDER:[TOKEN:[[id number]],FOLLOWUP:[1 week]] PROVIDER:[TOKEN:[[id number]],FOLLOWUP:[2 weeks]]

## 2022-10-04 NOTE — DISCHARGE NOTE NURSING/CASE MANAGEMENT/SOCIAL WORK - NSDCPEFALRISK_GEN_ALL_CORE
For information on Fall & Injury Prevention, visit: [email or web address] OR  [email or web address] OR  https://www.cdc.gov/steadi/patient.html

## 2022-10-04 NOTE — DISCHARGE NOTE PROVIDER - NSDCFUADDAPPT_GEN_ALL_CORE_FT
Follow up with your PCP Dr Peters in 1-2 weeks Follow up with your PCP Dr Peters in 1-2 weeks    Discharge instructions as follows, when ready to d/c:                   - Continue medical regimen as prescribed to prevent chest pain                   - Continue dual anti-platelet therapy, beta blocker, statin                   - If you are diabetic and taking medication containing Metformin, do not take them for 48 hours after the procedure                   - Instructed to call 911 if chest pain, shortness of breath or bleeding from access site                   - No heavy lifting >10lbs x 1 week                   - No driving x 24 hours                   - No baths, swimming pools x 1 week, may shower                   - Low sodium low fat low cholesterol diet                   - Follow-up with Cardiologist in 2 weeks after discharge

## 2022-10-04 NOTE — DISCHARGE NOTE PROVIDER - NSDCFUADDINST_GEN_ALL_CORE_FT
- Continue medical regimen as prescribed to prevent chest pain                   - Continue dual anti-platelet therapy, beta blocker, statin                   - If you are diabetic and taking medication containing Metformin, do not take them for 48 hours after the procedure                   - Instructed to call 911 if chest pain, shortness of breath or bleeding from access site                   - No heavy lifting >10lbs x 1 week                   - No driving x 24 hours                   - No baths, swimming pools x 1 week, may shower                   - Low sodium low fat low cholesterol diet                   - Follow-up with Cardiologist in 2 weeks after discharge

## 2022-10-04 NOTE — DISCHARGE NOTE PROVIDER - CARE PROVIDERS DIRECT ADDRESSES
[email or web address]

## 2022-10-04 NOTE — PROGRESS NOTE ADULT - PROVIDER SPECIALTY LIST ADULT
Cardiology
Hospitalist
Cardiology
Intervent Cardiology
Hospitalist
Hospitalist
Internal Medicine
Pulmonology

## 2022-10-04 NOTE — DISCHARGE NOTE NURSING/CASE MANAGEMENT/SOCIAL WORK - NSDCFUADDAPPT_GEN_ALL_CORE_FT
Follow up with your PCP Dr Peters in 1-2 weeks    Discharge instructions as follows, when ready to d/c:                   - Continue medical regimen as prescribed to prevent chest pain                   - Continue dual anti-platelet therapy, beta blocker, statin                   - If you are diabetic and taking medication containing Metformin, do not take them for 48 hours after the procedure                   - Instructed to call 911 if chest pain, shortness of breath or bleeding from access site                   - No heavy lifting >10lbs x 1 week                   - No driving x 24 hours                   - No baths, swimming pools x 1 week, may shower                   - Low sodium low fat low cholesterol diet                   - Follow-up with Cardiologist in 2 weeks after discharge

## 2022-10-12 DIAGNOSIS — F43.10 POST-TRAUMATIC STRESS DISORDER, UNSPECIFIED: ICD-10-CM

## 2022-10-12 DIAGNOSIS — Z87.891 PERSONAL HISTORY OF NICOTINE DEPENDENCE: ICD-10-CM

## 2022-10-12 DIAGNOSIS — Z88.1 ALLERGY STATUS TO OTHER ANTIBIOTIC AGENTS STATUS: ICD-10-CM

## 2022-10-12 DIAGNOSIS — N18.30 CHRONIC KIDNEY DISEASE, STAGE 3 UNSPECIFIED: ICD-10-CM

## 2022-10-12 DIAGNOSIS — Z79.899 OTHER LONG TERM (CURRENT) DRUG THERAPY: ICD-10-CM

## 2022-10-12 DIAGNOSIS — F31.9 BIPOLAR DISORDER, UNSPECIFIED: ICD-10-CM

## 2022-10-12 DIAGNOSIS — F41.9 ANXIETY DISORDER, UNSPECIFIED: ICD-10-CM

## 2022-10-12 DIAGNOSIS — I21.4 NON-ST ELEVATION (NSTEMI) MYOCARDIAL INFARCTION: ICD-10-CM

## 2022-10-12 DIAGNOSIS — I25.84 CORONARY ATHEROSCLEROSIS DUE TO CALCIFIED CORONARY LESION: ICD-10-CM

## 2022-10-12 DIAGNOSIS — E78.5 HYPERLIPIDEMIA, UNSPECIFIED: ICD-10-CM

## 2022-10-12 DIAGNOSIS — I25.119 ATHEROSCLEROTIC HEART DISEASE OF NATIVE CORONARY ARTERY WITH UNSPECIFIED ANGINA PECTORIS: ICD-10-CM

## 2022-11-09 ENCOUNTER — EMERGENCY (EMERGENCY)
Facility: HOSPITAL | Age: 72
LOS: 0 days | Discharge: HOME | End: 2022-11-09
Attending: EMERGENCY MEDICINE

## 2022-11-09 DIAGNOSIS — Z95.5 PRESENCE OF CORONARY ANGIOPLASTY IMPLANT AND GRAFT: Chronic | ICD-10-CM

## 2022-11-09 DIAGNOSIS — Z02.9 ENCOUNTER FOR ADMINISTRATIVE EXAMINATIONS, UNSPECIFIED: ICD-10-CM

## 2022-11-09 PROCEDURE — L9992: CPT

## 2022-11-11 ENCOUNTER — APPOINTMENT (OUTPATIENT)
Dept: CARDIOLOGY | Facility: CLINIC | Age: 72
End: 2022-11-11

## 2022-12-02 ENCOUNTER — APPOINTMENT (OUTPATIENT)
Dept: CARDIOLOGY | Facility: CLINIC | Age: 72
End: 2022-12-02

## 2022-12-02 VITALS
SYSTOLIC BLOOD PRESSURE: 102 MMHG | HEIGHT: 68 IN | DIASTOLIC BLOOD PRESSURE: 70 MMHG | HEART RATE: 55 BPM | WEIGHT: 178 LBS | BODY MASS INDEX: 26.98 KG/M2

## 2022-12-02 DIAGNOSIS — F31.9 BIPOLAR DISORDER, UNSPECIFIED: ICD-10-CM

## 2022-12-02 DIAGNOSIS — Z95.5 PRESENCE OF CORONARY ANGIOPLASTY IMPLANT AND GRAFT: ICD-10-CM

## 2022-12-02 DIAGNOSIS — Z78.9 OTHER SPECIFIED HEALTH STATUS: ICD-10-CM

## 2022-12-02 DIAGNOSIS — I25.10 ATHEROSCLEROTIC HEART DISEASE OF NATIVE CORONARY ARTERY W/OUT ANGINA PECTORIS: ICD-10-CM

## 2022-12-02 DIAGNOSIS — E78.5 HYPERLIPIDEMIA, UNSPECIFIED: ICD-10-CM

## 2022-12-02 DIAGNOSIS — R06.02 SHORTNESS OF BREATH: ICD-10-CM

## 2022-12-02 DIAGNOSIS — Z86.59 PERSONAL HISTORY OF OTHER MENTAL AND BEHAVIORAL DISORDERS: ICD-10-CM

## 2022-12-02 DIAGNOSIS — I20.9 ANGINA PECTORIS, UNSPECIFIED: ICD-10-CM

## 2022-12-02 DIAGNOSIS — I21.9 ACUTE MYOCARDIAL INFARCTION, UNSPECIFIED: ICD-10-CM

## 2022-12-02 PROCEDURE — 99214 OFFICE O/P EST MOD 30 MIN: CPT

## 2022-12-02 PROCEDURE — 93000 ELECTROCARDIOGRAM COMPLETE: CPT

## 2022-12-02 RX ORDER — SERTRALINE HYDROCHLORIDE 100 MG/1
100 TABLET, FILM COATED ORAL
Qty: 30 | Refills: 0 | Status: ACTIVE | COMMUNITY
Start: 2022-11-11

## 2022-12-02 RX ORDER — SERTRALINE HYDROCHLORIDE 50 MG/1
50 TABLET, FILM COATED ORAL
Qty: 30 | Refills: 0 | Status: ACTIVE | COMMUNITY
Start: 2022-11-11

## 2022-12-02 RX ORDER — ATORVASTATIN CALCIUM 80 MG/1
80 TABLET, FILM COATED ORAL
Qty: 90 | Refills: 3 | Status: ACTIVE | COMMUNITY
Start: 1900-01-01 | End: 1900-01-01

## 2022-12-02 RX ORDER — LITHIUM CARBONATE 300 MG/1
300 TABLET ORAL
Qty: 60 | Refills: 0 | Status: ACTIVE | COMMUNITY
Start: 2022-11-28

## 2022-12-02 RX ORDER — METOPROLOL TARTRATE 25 MG/1
25 TABLET, FILM COATED ORAL TWICE DAILY
Qty: 180 | Refills: 3 | Status: ACTIVE | COMMUNITY
Start: 1900-01-01 | End: 1900-01-01

## 2022-12-02 RX ORDER — KRILL/OM-3/DHA/EPA/PHOSPHO/AST 1000-230MG
81 CAPSULE ORAL DAILY
Qty: 90 | Refills: 0 | Status: ACTIVE | COMMUNITY
Start: 2022-12-02 | End: 1900-01-01

## 2022-12-02 RX ORDER — FOLIC ACID 1 MG/1
1 TABLET ORAL
Qty: 30 | Refills: 0 | Status: ACTIVE | COMMUNITY
Start: 2022-07-21

## 2022-12-02 RX ORDER — PANTOPRAZOLE 40 MG/1
40 TABLET, DELAYED RELEASE ORAL DAILY
Qty: 90 | Refills: 3 | Status: ACTIVE | COMMUNITY
Start: 1900-01-01 | End: 1900-01-01

## 2022-12-02 RX ORDER — CLONAZEPAM 1 MG/1
1 TABLET ORAL
Qty: 60 | Refills: 0 | Status: ACTIVE | COMMUNITY
Start: 2022-11-28

## 2022-12-02 NOTE — PHYSICAL EXAM
[No Acute Distress] : no acute distress [Normal Venous Pressure] : normal venous pressure [Normal S1, S2] : normal S1, S2 [No Murmur] : no murmur [No Rub] : no rub [No Gallop] : no gallop [Clear Lung Fields] : clear lung fields [Soft] : abdomen soft [Normal Bowel Sounds] : normal bowel sounds [Normal Gait] : normal gait [No Edema] : no edema [No Varicosities] : no varicosities [No Rash] : no rash [Moves all extremities] : moves all extremities [No Focal Deficits] : no focal deficits [Normal Speech] : normal speech [Alert and Oriented] : alert and oriented [Normal memory] : normal memory

## 2022-12-05 NOTE — ASSESSMENT
[FreeTextEntry1] : Assessment:\par #CAD, S/P MI, Angina\par #S/p PCI\par #HL\par \par Plan:\par - set up for Cardiac Catheterization/PCPI of lcx  on 12/12/22 \par - Cont Atorvastatin, Plavix, ASA, Metoprolol \par - Activities as tolerated \par - Low Fat, Low Na diet \par - Repeat labs\par - Cardiac rehab\par - F/u  after Cath\par \par ATT NOTE\par PT SEEN AND EXAMINED\par AGREE WITH ABOVE\par

## 2022-12-05 NOTE — REVIEW OF SYSTEMS
[Negative] : Heme/Lymph [Hearing Loss] : hearing loss [Dyspnea on exertion] : dyspnea during exertion [Chest Discomfort] : chest discomfort [Feeling Fatigued] : feeling fatigued [Lower Ext Edema] : no extremity edema [Leg Claudication] : no intermittent leg claudication [Palpitations] : no palpitations [Orthopnea] : no orthopnea [PND] : no PND [Syncope] : no syncope [de-identified] : h/o bipolar dis-r

## 2022-12-05 NOTE — HISTORY OF PRESENT ILLNESS
[FreeTextEntry1] : Pt is 72 year old Male with PMH of CKD Stage,Bipolar disorder, PTSD, hearing loss, HL was not on medications prior to hospitalization.\par S/p hospitalization at [facility] due to chest pain \par S/p Cardiac Cath with severe 2 v cad\par Successful PCI of  pRCA with balloon angioplasty s/p JERRI x2\par + cp and gomez with mild effort\par  mg/dL A1C  4.8\par TTE Normal LV size and LV systolic function \par no palpitations, no edema \par  \par  \par \par \par \par

## 2022-12-08 LAB
ANION GAP SERPL CALC-SCNC: 16 MMOL/L
BASOPHILS # BLD AUTO: 0.09 K/UL
BASOPHILS NFR BLD AUTO: 0.8 %
BUN SERPL-MCNC: 19 MG/DL
CALCIUM SERPL-MCNC: 10.8 MG/DL
CHLORIDE SERPL-SCNC: 105 MMOL/L
CO2 SERPL-SCNC: 17 MMOL/L
CREAT SERPL-MCNC: 1.3 MG/DL
EGFR: 58 ML/MIN/1.73M2
EOSINOPHIL # BLD AUTO: 0.53 K/UL
EOSINOPHIL NFR BLD AUTO: 4.7 %
GLUCOSE SERPL-MCNC: 110 MG/DL
HCT VFR BLD CALC: 47.5 %
HGB BLD-MCNC: 16.4 G/DL
IMM GRANULOCYTES NFR BLD AUTO: 0.4 %
INR PPP: 0.94 RATIO
LYMPHOCYTES # BLD AUTO: 3.76 K/UL
LYMPHOCYTES NFR BLD AUTO: 33.2 %
MAN DIFF?: NORMAL
MCHC RBC-ENTMCNC: 33.3 PG
MCHC RBC-ENTMCNC: 34.5 G/DL
MCV RBC AUTO: 96.5 FL
MONOCYTES # BLD AUTO: 0.47 K/UL
MONOCYTES NFR BLD AUTO: 4.2 %
NEUTROPHILS # BLD AUTO: 6.43 K/UL
NEUTROPHILS NFR BLD AUTO: 56.7 %
PLATELET # BLD AUTO: 226 K/UL
POTASSIUM SERPL-SCNC: 4.2 MMOL/L
PT BLD: 10.7 SEC
RBC # BLD: 4.92 M/UL
RBC # FLD: 13.4 %
SODIUM SERPL-SCNC: 138 MMOL/L
WBC # FLD AUTO: 11.32 K/UL

## 2022-12-18 ENCOUNTER — LABORATORY RESULT (OUTPATIENT)
Age: 72
End: 2022-12-18

## 2023-01-03 ENCOUNTER — LABORATORY RESULT (OUTPATIENT)
Age: 73
End: 2023-01-03

## 2023-01-03 VITALS
OXYGEN SATURATION: 99 % | DIASTOLIC BLOOD PRESSURE: 97 MMHG | HEART RATE: 57 BPM | SYSTOLIC BLOOD PRESSURE: 159 MMHG | RESPIRATION RATE: 18 BRPM

## 2023-01-03 NOTE — H&P CARDIOLOGY - NSICDXPASTMEDICALHX_GEN_ALL_CORE_FT
PAST MEDICAL HISTORY:  Anxiety     Bipolar disorder in full remission, most recent episode unspecified type     H/O non-ST elevation myocardial infarction (NSTEMI)     Post traumatic stress disorder (PTSD)     Stage 3 chronic kidney disease

## 2023-01-03 NOTE — H&P CARDIOLOGY - HISTORY OF PRESENT ILLNESS
Patient is a 71 y/o male with PMHx of CAD (NSTEMI sig for 2VCAD RCA/LCx 10/3/22 w/ JERRI x 2 pRCA), HLD, CKD (Cr 1.2-1.4), and bipolar disorder. Patient denies any CP, palpitations, or edema however c/o mild SOB. Presents today for [facility] with staged intervention to LCx.   Patient is a 71 y/o male with PMHx of CAD (NSTEMI sig for 2VCAD RCA/LCx 10/3/22 w/ JERRI x 2 pRCA), HLD, CKD (Cr 1.2-1.4), and bipolar disorder. Patient denies any CP, palpitations, or edema however c/o mild SOB. Presents today for [facility] with staged intervention to LCx.    Pre cath note:    indication:  [ ] STEMI                [ ] NSTEMI                 [ ] Acute coronary syndrome                     [ ]Unstable Angina   [ ] high risk  [ ] intermediate risk  [ ] low risk                     [ ] Stable Angina     non-invasive testing:                          Date:                     result: [ ] high risk  [ ] intermediate risk  [ ] low risk    Anti- Anginal medications:                    [ ] not used                       [x ] used                   [ ] not used but strong indication not to use    Ejection Fraction                   [ ] <29            [ ] 30-39%   [ ] 40-49%     [ x]>50%    CHF                   [ ] active (within last 14 days on meds   [ ] Chronic (on meds but no exacerbation)    COPD                   [ ] mild (on chronic bronchodilators)  [ ] moderate (on chronic steroid therapy)      [ ] severe (indication for home O2 or PACO2 >50)    Other risk factors:                       [ ] Previous MI                     [ ] CVA/ stroke                    [ ] carotid stent/ CEA                    [ ] PVD/PAD- (arterial aneurysm, non-palpable pulses, tortuous vessel with inability to insert catheter, infra-renal dissection, renal or subclavian artery stenosis)                    [ ] diabetic                    [ ] previous CABG                    [ ] Renal Failure           Right Torin Test:    Adjusted Cath Bleeding Risk: 2.0%    Pre-hydration: Patient is a 71 y/o male with PMHx of CAD (NSTEMI sig for 2VCAD RCA/LCx 10/3/22 w/ JERRI x 2 pRCA), HLD, CKD (Cr 1.2-1.4), family h/o CAD (brother fatal MI at 78), and bipolar disorder. Patient denies any CP, palpitations, or SOB at this time, states all symptoms have resolved since MI. Presents today for [facility] with staged intervention to LCx.    Pre cath note:    indication:  [ ] STEMI                [ ] NSTEMI                 [ ] Acute coronary syndrome                     [ ]Unstable Angina   [ ] high risk  [ ] intermediate risk  [ ] low risk                     [ ] Stable Angina     non-invasive testing:                          Date:                     result: [ ] high risk  [ ] intermediate risk  [ ] low risk    Anti- Anginal medications:                    [ ] not used                       [x ] used                   [ ] not used but strong indication not to use    Ejection Fraction                   [ ] <29            [ ] 30-39%   [ ] 40-49%     [ x]>50%    CHF                   [ ] active (within last 14 days on meds   [ ] Chronic (on meds but no exacerbation)    COPD                   [ ] mild (on chronic bronchodilators)  [ ] moderate (on chronic steroid therapy)      [ ] severe (indication for home O2 or PACO2 >50)    Other risk factors:                       [x ] Previous MI                     [ ] CVA/ stroke                    [ ] carotid stent/ CEA                    [ ] PVD/PAD- (arterial aneurysm, non-palpable pulses, tortuous vessel with inability to insert catheter, infra-renal dissection, renal or subclavian artery stenosis)                    [ ] diabetic                    [ ] previous CABG                    [ ] Renal Failure           Right Torin Test: positive    Adjusted Cath Bleeding Risk: 2.0%    Pre-hydration: NS 250ml bolus x 1 hr prior to cardiac cath Patient is a 71 y/o male with PMHx of CAD (NSTEMI sig for 2VCAD RCA/LCx 10/3/22 w/ JERRI x 2 pRCA), HLD, CKD (Cr 1.2-1.4), family h/o CAD (brother fatal MI at 78), and bipolar disorder. Patient with occasional cp, no palpitations, or SOB at this time, states all symptoms are better since MI. Presents today for [facility] with staged intervention to LCx.    Pre cath note:    indication:  [ ] STEMI                [ ] NSTEMI                 [ ] Acute coronary syndrome                     [ ]Unstable Angina   [ ] high risk  [ ] intermediate risk  [ ] low risk                     [ ] Stable Angina     non-invasive testing:                          Date:                     result: [ ] high risk  [ ] intermediate risk  [ ] low risk    Anti- Anginal medications:                    [ ] not used                       [x ] used                   [ ] not used but strong indication not to use    Ejection Fraction                   [ ] <29            [ ] 30-39%   [ ] 40-49%     [ x]>50%    CHF                   [ ] active (within last 14 days on meds   [ ] Chronic (on meds but no exacerbation)    COPD                   [ ] mild (on chronic bronchodilators)  [ ] moderate (on chronic steroid therapy)      [ ] severe (indication for home O2 or PACO2 >50)    Other risk factors:                       [x ] Previous MI                     [ ] CVA/ stroke                    [ ] carotid stent/ CEA                    [ ] PVD/PAD- (arterial aneurysm, non-palpable pulses, tortuous vessel with inability to insert catheter, infra-renal dissection, renal or subclavian artery stenosis)                    [ ] diabetic                    [ ] previous CABG                    [ ] Renal Failure           Right Torin Test: positive    Adjusted Cath Bleeding Risk: 2.0%    Pre-hydration: NS 250ml bolus x 1 hr prior to cardiac cath

## 2023-01-03 NOTE — H&P CARDIOLOGY - EKG AND INTERPRETATION
Transitions of Care Plan  RUR: 15% - moderate  Clinical Update: paracentesis drain out; HD today; therapy  Consults: Nephrology; Palliative; Cardiology; Therapy  Baseline: independent; resides alone at [facility]  Barriers to Discharge: medical; placement  Disposition: SNF v Home Health - therapy will re-evaluate today  Estimated Discharge Date: 8/26/22    AFTERNOON UPDATE:  CM received call from attending MD to set patient up with home health services. Daughter will be with patient after discharge. Will need HH orders. Patient symptomatic today and not medically stable for discharge. MORNING UPDATES:    1000 - CM received [address] request forms for completion. CM spoke with patient who defers [facility] and also mention she will defer the \"procedure\" today. CM spoke with patient about disposition plan and recommendations for SNF. Patient is agreeable to rehab. Her preference is [facility] with backup choice being [facility]. 1020 - CM spoke with attending MD on conversation. Attending MD would like therapy to work with patient again and reevaluate because patient seems functionally improved. 1035 - CM faxed all clinicals and supporting information to [address]. Included request for SNF placement at [facility][1]. Transition of Care Plan:     The Plan for Transition of Care is related to the following treatment goals: SNF    The Patient and/or patient representative PATIENT was provided with a choice of provider and agrees  with the discharge plan. Yes [x] No []    A Freedom of choice list was provided with basic dialogue that supports the patient's individualized plan of care/goals and shares the quality data associated with the providers. Yes [x] No []    Medicare pt has received, reviewed, and signed 2nd IM letter informing them of their right to appeal the discharge. Signed copied has been placed on pt bedside chart. CM will continue to follow.     Kenroy Octaviano Cristina, [address]  Available via [address] [place] [address] or   12/2 sinus bradycardia, RBBB 55bpm

## 2023-01-05 ENCOUNTER — OUTPATIENT (OUTPATIENT)
Dept: OUTPATIENT SERVICES | Facility: HOSPITAL | Age: 73
LOS: 1 days | Discharge: HOME | End: 2023-01-05
Payer: MEDICARE

## 2023-01-05 DIAGNOSIS — Z98.890 OTHER SPECIFIED POSTPROCEDURAL STATES: Chronic | ICD-10-CM

## 2023-01-05 DIAGNOSIS — Z95.5 PRESENCE OF CORONARY ANGIOPLASTY IMPLANT AND GRAFT: Chronic | ICD-10-CM

## 2023-01-05 LAB
ANION GAP SERPL CALC-SCNC: 11 MMOL/L — SIGNIFICANT CHANGE UP (ref 7–14)
BUN SERPL-MCNC: 16 MG/DL — SIGNIFICANT CHANGE UP (ref 10–20)
CALCIUM SERPL-MCNC: 10.7 MG/DL — HIGH (ref 8.4–10.5)
CHLORIDE SERPL-SCNC: 105 MMOL/L — SIGNIFICANT CHANGE UP (ref 98–110)
CO2 SERPL-SCNC: 21 MMOL/L — SIGNIFICANT CHANGE UP (ref 17–32)
CREAT SERPL-MCNC: 1.1 MG/DL — SIGNIFICANT CHANGE UP (ref 0.7–1.5)
EGFR: 71 ML/MIN/1.73M2 — SIGNIFICANT CHANGE UP
GLUCOSE SERPL-MCNC: 100 MG/DL — HIGH (ref 70–99)
HCT VFR BLD CALC: 46.2 % — SIGNIFICANT CHANGE UP (ref 42–52)
HCT VFR BLD CALC: 46.2 % — SIGNIFICANT CHANGE UP (ref 42–52)
HGB BLD-MCNC: 15.8 G/DL — SIGNIFICANT CHANGE UP (ref 14–18)
HGB BLD-MCNC: 16.4 G/DL — SIGNIFICANT CHANGE UP (ref 14–18)
MCHC RBC-ENTMCNC: 33.8 PG — HIGH (ref 27–31)
MCHC RBC-ENTMCNC: 34.2 G/DL — SIGNIFICANT CHANGE UP (ref 32–37)
MCHC RBC-ENTMCNC: 34.2 PG — HIGH (ref 27–31)
MCHC RBC-ENTMCNC: 35.5 G/DL — SIGNIFICANT CHANGE UP (ref 32–37)
MCV RBC AUTO: 96.5 FL — HIGH (ref 80–94)
MCV RBC AUTO: 98.7 FL — HIGH (ref 80–94)
NRBC # BLD: 0 /100 WBCS — SIGNIFICANT CHANGE UP (ref 0–0)
NRBC # BLD: 0 /100 WBCS — SIGNIFICANT CHANGE UP (ref 0–0)
PLATELET # BLD AUTO: 187 K/UL — SIGNIFICANT CHANGE UP (ref 130–400)
PLATELET # BLD AUTO: 190 K/UL — SIGNIFICANT CHANGE UP (ref 130–400)
POTASSIUM SERPL-MCNC: 4 MMOL/L — SIGNIFICANT CHANGE UP (ref 3.5–5)
POTASSIUM SERPL-SCNC: 4 MMOL/L — SIGNIFICANT CHANGE UP (ref 3.5–5)
RBC # BLD: 4.68 M/UL — LOW (ref 4.7–6.1)
RBC # BLD: 4.79 M/UL — SIGNIFICANT CHANGE UP (ref 4.7–6.1)
RBC # FLD: 13.5 % — SIGNIFICANT CHANGE UP (ref 11.5–14.5)
RBC # FLD: 13.6 % — SIGNIFICANT CHANGE UP (ref 11.5–14.5)
SODIUM SERPL-SCNC: 137 MMOL/L — SIGNIFICANT CHANGE UP (ref 135–146)
WBC # BLD: 10.05 K/UL — SIGNIFICANT CHANGE UP (ref 4.8–10.8)
WBC # BLD: 10.94 K/UL — HIGH (ref 4.8–10.8)
WBC # FLD AUTO: 10.05 K/UL — SIGNIFICANT CHANGE UP (ref 4.8–10.8)
WBC # FLD AUTO: 10.94 K/UL — HIGH (ref 4.8–10.8)

## 2023-01-05 PROCEDURE — 93010 ELECTROCARDIOGRAM REPORT: CPT

## 2023-01-05 PROCEDURE — 93458 L HRT ARTERY/VENTRICLE ANGIO: CPT | Mod: 26,XU

## 2023-01-05 PROCEDURE — 92928 PRQ TCAT PLMT NTRAC ST 1 LES: CPT | Mod: LC

## 2023-01-05 RX ORDER — SERTRALINE 25 MG/1
150 TABLET, FILM COATED ORAL
Qty: 0 | Refills: 0 | DISCHARGE

## 2023-01-05 RX ORDER — MIDAZOLAM HYDROCHLORIDE 1 MG/ML
2 INJECTION, SOLUTION INTRAMUSCULAR; INTRAVENOUS ONCE
Refills: 0 | Status: DISCONTINUED | OUTPATIENT
Start: 2023-01-05 | End: 2023-01-05

## 2023-01-05 RX ORDER — ATORVASTATIN CALCIUM 80 MG/1
1 TABLET, FILM COATED ORAL
Qty: 30 | Refills: 1
Start: 2023-01-05 | End: 2023-03-05

## 2023-01-05 RX ORDER — ATORVASTATIN CALCIUM 80 MG/1
1 TABLET, FILM COATED ORAL
Qty: 30 | Refills: 3
Start: 2023-01-05 | End: 2023-05-04

## 2023-01-05 RX ORDER — CLOPIDOGREL BISULFATE 75 MG/1
1 TABLET, FILM COATED ORAL
Qty: 30 | Refills: 3
Start: 2023-01-05 | End: 2023-05-04

## 2023-01-05 RX ADMIN — MIDAZOLAM HYDROCHLORIDE 2 MILLIGRAM(S): 1 INJECTION, SOLUTION INTRAMUSCULAR; INTRAVENOUS at 12:50

## 2023-01-05 NOTE — CHART NOTE - NSCHARTNOTEFT_GEN_A_CORE
PRE-OP DIAGNOSIS:  staged PCI LCx    PROCEDURE:   [ ] Coronary Angiogram   [ x] LHC   [ ] LVG   [ ] RHC   [x ] Intervention (see below)       PHYSICIAN:  Dr. Carrera  FELLOW: Ro    PROCEDURE DESCRIPTION:     Consent:    [x] Patient   [] Family Member   []  Used      Anesthesia:   [ ] General   [X] Sedation   [X] Local     Access & Closure:   [x ] 6  Fr Radial Artery  -> D-stat  [ ]  Fr Femoral Artery ->   [ ]  Fr Femoral Vein ->   [ ]  Fr Brachial Vein ->    IV Contrast: 100 mL      Intervention:  Successful PCI of  LCx into OM1 with balloon angioplasty s/p JERRI x1    Implants:      CEE FRONTIER RX [id number]  to    LCx into OM1            AUC: 7     FINDINGS:   Coronary Dominance: right  LM: mild disease  LAD: 70% mLAD stenosis. Mild disease in remainder of vessel  CX: 90% OM1. Mild disease in remainder of vessel   RCA: patent stents. 70% RPDA stenosis.    LVEDP:  5 mmHg     EF: not assessed      ESTIMATED BLOOD LOSS: < 10 mL      CONDITION:   [x] Good   [ ] Fair   [ ] Critical     SPECIMEN REMOVED: N/A     POST-OP DIAGNOSIS:    [ ] Normal Coronary Angiogram   [ ] Mild Coronary Artery Disease (< 50% stenosis)   [x ] 1-Vessel Coronary Artery Disease      PLAN OF CARE:   [ x] D/C Home Today   [ ] Return to In-patient bed   [ ] Admit for observation   [ ] Return for Staged Procedure in 4-6 weeks   [ ] CT Surgery Consult   [X] Medications: ASA,  statin, plavix, metoprolol  [X] IV Fluids: NS @ 125cc/h x 6 hours  [ ] EP Consult  [x] Remove D-stat and Hold manual pressure if signs of hematoma or bleeding over radial access site.  [x] Smoking cessation PRE-OP DIAGNOSIS:  staged PCI LCx    PROCEDURE:   [ ] Coronary Angiogram   [ x] LHC   [ ] LVG   [ ] RHC   [x ] Intervention (see below)       PHYSICIAN:  Dr. Carrera  FELLOW: Ro    PROCEDURE DESCRIPTION:     Consent:    [x] Patient   [] Family Member   []  Used      Anesthesia:   [ ] General   [X] Sedation   [X] Local     Access & Closure:   [x ] 6  Fr Radial Artery  -> D-stat  [ ]  Fr Femoral Artery ->   [ ]  Fr Femoral Vein ->   [ ]  Fr Brachial Vein ->    IV Contrast: 100 mL      Intervention:  Successful PCI of  LCx into OM1 with balloon angioplasty s/p JERRI x1    Implants:      CEE FRONTIER RX [id number]  to    LCx into OM1            AUC: 7     FINDINGS:   Coronary Dominance: right  LM: mild disease  LAD: 60% mLAD stenosis. Mild disease in remainder of vessel  CX: 95% OM1. Mild disease in remainder of vessel   RCA: patent stents. 70% RPDA stenosis.    LVEDP:  5 mmHg     EF: not assessed      ESTIMATED BLOOD LOSS: < 10 mL      CONDITION:   [x] Good   [ ] Fair   [ ] Critical     SPECIMEN REMOVED: N/A     POST-OP DIAGNOSIS:    [ ] Normal Coronary Angiogram   [ ] Mild Coronary Artery Disease (< 50% stenosis)   [x ] 3-Vessel Coronary Artery Disease      PLAN OF CARE:   [ x] D/C Home Today   [ ] Return to In-patient bed   [ ] Admit for observation   [ ] Return for Staged Procedure in 4-6 weeks   [ ] CT Surgery Consult   [X] Medications: ASA,  statin, plavix, metoprolol  [X] IV Fluids: NS @ 125cc/h x 6 hours  [ ] EP Consult  [x] Remove D-stat and Hold manual pressure if signs of hematoma or bleeding over radial access site.  [x] Smoking cessation

## 2023-01-05 NOTE — ASU PATIENT PROFILE, ADULT - FALL HARM RISK - UNIVERSAL INTERVENTIONS
Bed in lowest position, wheels locked, appropriate side rails in place/Call bell, personal items and telephone in reach/Instruct patient to call for assistance before getting out of bed or chair/Non-slip footwear when patient is out of bed/[place] to call system/Physically safe environment - no spills, clutter or unnecessary equipment/Purposeful Proactive Rounding/Room/bathroom lighting operational, light cord in reach

## 2023-01-05 NOTE — PROGRESS NOTE ADULT - SUBJECTIVE AND OBJECTIVE BOX
Cardiology Follow up    VITA CORREA   72y Male  PAST MEDICAL & SURGICAL HISTORY:  Stage 3 chronic kidney disease      Bipolar disorder in full remission, most recent episode unspecified type      Anxiety      Post traumatic stress disorder (PTSD)      H/O non-ST elevation myocardial infarction (NSTEMI)      S/P right coronary artery (RCA) stent placement      H/O hernia repair           HPI:  Patient is a 71 y/o male with PMHx of CAD (NSTEMI sig for 2VCAD RCA/LCx 10/3/22 w/ JERRI x 2 pRCA), HLD, CKD (Cr 1.2-1.4), family h/o CAD (brother fatal MI at 78), and bipolar disorder. Patient with occasional cp, no palpitations, or SOB at this time, states all symptoms are better since MI. Presents today for [place] with staged intervention to LCx.    Pre cath note:    indication:  [ ] STEMI                [ ] NSTEMI                 [ ] Acute coronary syndrome                     [ ]Unstable Angina   [ ] high risk  [ ] intermediate risk  [ ] low risk                     [ ] Stable Angina     non-invasive testing:                          Date:                     result: [ ] high risk  [ ] intermediate risk  [ ] low risk    Anti- Anginal medications:                    [ ] not used                       [x ] used                   [ ] not used but strong indication not to use    Ejection Fraction                   [ ] <29            [ ] 30-39%   [ ] 40-49%     [ x]>50%    CHF                   [ ] active (within last 14 days on meds   [ ] Chronic (on meds but no exacerbation)    COPD                   [ ] mild (on chronic bronchodilators)  [ ] moderate (on chronic steroid therapy)      [ ] severe (indication for home O2 or PACO2 >50)    Other risk factors:                       [x ] Previous MI                     [ ] CVA/ stroke                    [ ] carotid stent/ CEA                    [ ] PVD/PAD- (arterial aneurysm, non-palpable pulses, tortuous vessel with inability to insert catheter, infra-renal dissection, renal or subclavian artery stenosis)                    [ ] diabetic                    [ ] previous CABG                    [ ] Renal Failure           Right Torin Test: positive    Adjusted Cath Bleeding Risk: 2.0%    Pre-hydration: NS 250ml bolus x 1 hr prior to cardiac cath (03 Jan 2023 14:08)    Allergies    No Known Allergies    Intolerances      Patient seen and examined at bedside.   Patient without complaints.  Denies CP, SOB, palpitations, or dizziness    Vital Signs:    HR: 56  BP: 137/89  RR: 19  SpO2: 97% room air        MEDICATIONS  (STANDING):    MEDICATIONS  (PRN):      REVIEW OF SYSTEMS:          All negative except as mentioned in HPI    PHYSICAL EXAM:           CONSTITUTIONAL: Well-developed; well-nourished; in no acute distress  	SKIN: warm, dry  	HEAD: Normocephalic; atraumatic  	EYES: PERRL.  	ENT: No nasal discharge, airway clear, mucous membranes moist  	NECK: Supple; non tender.  	CARD: +S1, +S2, no murmurs, gallops, or rubs. Regular rate and rhythm    	RESP: No wheezes, rales or rhonchi. CTA B/L  	ABD: soft ntnd, + BS x 4 quadrants  	EXT: moves all extremities,  no clubbing, cyanosis or edema  	NEURO: Alert and oriented x3, no focal deficits          PSYCH: Cooperative, appropriate          VASCULAR:  + Rad / + PTs / + DPs          EXTREMITY:  	   Right Radial: DSTAT in place, access site soft, no hematoma, no pain, + pulses, no sign of infection, no numbness            ECG: pending                                                                                                              LABS: pending                        16.4   10.94 )-----------( 187      ( 05 Jan 2023 08:20 )             46.2     01-05    137  |  105  |  16  ----------------------------<  100<H>  4.0   |  21  |  1.1    Ca    10.7<H>      05 Jan 2023 08:20                  A/P:  I discussed the case with Cardiologist Dr. Carrera   and recommend the following:    S/P PCI today:    Intervention:  Successful PCI of  LCx into OM1 with balloon angioplasty s/p JERRI x1    Implants:      CEE FRONTIER RX [id number]  to    LCx into OM1            AUC: 7     FINDINGS:   Coronary Dominance: right  LM: mild disease  LAD: 60% mLAD stenosis. Mild disease in remainder of vessel  CX: 95% OM1. Mild disease in remainder of vessel   RCA: patent stents. 70% RPDA stenosis.    LVEDP:  5 mmHg         	         Continue DAPT ( Aspirin 81 mg PO Daily and Plavix 75mg daily. ),  B-Blocker, Statin Therapy                   Patient given 30 day supply of ( Aspirin 81 mg daily and Plavix 75 mg daily ) to take at home                   No ACE/ARB due to patient not diabetic, EF WNL                   CBC @ 1400.                    EKG prior to d/c @ 1400.                   NS @ 125 ml/hr x 5hrs                   OOB/ambulate.                   Monitor access site/ distal pulses                   Patient agreeing to take DAPT for at least one year or as directed by cardiologist                    Pt given instructions on importance of taking antiplatelet medication or risk acute stent thrombosis/death                   Post cath instructions, access site care and activity restrictions reviewed with patient                     Discussed with patient to return to hospital if experience chest pain, shortness breath, dizziness and site bleeding                   Aggressive risk factor modification, diet counseling, smoking cessation discussed with patient                    Benefits of cardiac rehab discussed with patient and all documents sent to cardiac rehab center                   Patient instructed to call cardiac rehab and make initial appointment after first follow-up visit with Cardiologist                    Can discharge patient @1700 from cardiac standpoint after ambulating without symptoms, access site wnl, labs and ECG reviewed                    Follow up with Cardiology Dr. Carrera in two weeks.  Instructed to call and make an appointment

## 2023-01-10 DIAGNOSIS — I25.119 ATHEROSCLEROTIC HEART DISEASE OF NATIVE CORONARY ARTERY WITH UNSPECIFIED ANGINA PECTORIS: ICD-10-CM

## 2023-01-10 DIAGNOSIS — I20.9 ANGINA PECTORIS, UNSPECIFIED: ICD-10-CM

## 2023-01-10 DIAGNOSIS — Z87.891 PERSONAL HISTORY OF NICOTINE DEPENDENCE: ICD-10-CM

## 2023-01-10 DIAGNOSIS — N18.9 CHRONIC KIDNEY DISEASE, UNSPECIFIED: ICD-10-CM

## 2023-01-10 DIAGNOSIS — I12.9 HYPERTENSIVE CHRONIC KIDNEY DISEASE WITH STAGE 1 THROUGH STAGE 4 CHRONIC KIDNEY DISEASE, OR UNSPECIFIED CHRONIC KIDNEY DISEASE: ICD-10-CM

## 2023-01-10 DIAGNOSIS — I25.2 OLD MYOCARDIAL INFARCTION: ICD-10-CM

## 2023-01-13 NOTE — ED ADULT NURSE NOTE - NSIMPLEMENTINTERV_GEN_ALL_ED
Implemented All Universal Safety Interventions:  [place] to call system. Call bell, personal items and telephone within reach. Instruct patient to call for assistance. Room bathroom lighting operational. Non-slip footwear when patient is off stretcher. Physically safe environment: no spills, clutter or unnecessary equipment. Stretcher in lowest position, wheels locked, appropriate side rails in place. Rhombic Flap Text: The defect edges were debeveled with a #15 scalpel blade.  Given the location of the defect and the proximity to free margins a rhombic flap was deemed most appropriate.  Using a sterile surgical marker, an appropriate rhombic flap was drawn incorporating the defect.    The area thus outlined was incised deep to adipose tissue with a #15 scalpel blade.  The skin margins were undermined to an appropriate distance in all directions utilizing iris scissors.

## 2023-01-27 ENCOUNTER — APPOINTMENT (OUTPATIENT)
Dept: CARDIOLOGY | Facility: CLINIC | Age: 73
End: 2023-01-27

## 2023-04-03 ENCOUNTER — NON-APPOINTMENT (OUTPATIENT)
Age: 73
End: 2023-04-03

## 2023-06-07 NOTE — ED BEHAVIORAL HEALTH ASSESSMENT NOTE - PAST PSYCHOTROPIC MEDICATION
Statement Selected
Reportedly on Li 600 qhs, Zoloft 100 mg qd and 50 qhs, and Klonopin 2 mg BID from [facility] [phone] (DOSAGES NOT CONFIRMED AS PHARMACY CLOSED.)     Found on iSTOP: Pt picked up Klonopin 1 mg BID 1mo supply 2d ago.

## 2023-11-03 ENCOUNTER — EMERGENCY (EMERGENCY)
Facility: HOSPITAL | Age: 73
LOS: 0 days | Discharge: HOME | End: 2023-11-03
Attending: EMERGENCY MEDICINE
Payer: MEDICARE

## 2023-11-03 VITALS
RESPIRATION RATE: 19 BRPM | DIASTOLIC BLOOD PRESSURE: 74 MMHG | TEMPERATURE: 98 F | HEIGHT: 68 IN | WEIGHT: 154.98 LBS | HEART RATE: 74 BPM | SYSTOLIC BLOOD PRESSURE: 138 MMHG | OXYGEN SATURATION: 98 %

## 2023-11-03 DIAGNOSIS — Z76.0 ENCOUNTER FOR ISSUE OF REPEAT PRESCRIPTION: ICD-10-CM

## 2023-11-03 DIAGNOSIS — Z95.5 PRESENCE OF CORONARY ANGIOPLASTY IMPLANT AND GRAFT: Chronic | ICD-10-CM

## 2023-11-03 DIAGNOSIS — Z98.890 OTHER SPECIFIED POSTPROCEDURAL STATES: Chronic | ICD-10-CM

## 2023-11-03 PROBLEM — I25.2 OLD MYOCARDIAL INFARCTION: Chronic | Status: ACTIVE | Noted: 2023-01-03

## 2023-11-03 PROCEDURE — 99283 EMERGENCY DEPT VISIT LOW MDM: CPT | Mod: GC

## 2023-11-03 PROCEDURE — 99281 EMR DPT VST MAYX REQ PHY/QHP: CPT

## 2023-11-03 NOTE — ED PROVIDER NOTE - ATTENDING CONTRIBUTION TO CARE
73-year-old male past medical history significant for bipolar disorder, anxiety, hypertension, dyslipidemia, CAD requesting refill of clonazepam.  Patient complaining of anxiety after he ran out of his clonazepam.  Patient reports he is unable to refill his prescription at the pharmacy because he is not due for another refill.  Patient denies any chest pain, shortness of breath, palpitations.  Vitals noted.  CONSTITUTIONAL: Well-appearing; well-nourished; in no apparent distress.   HEAD: Normocephalic; atraumatic.   EYES: PERRL; EOM intact. Conjunctiva normal B/L.   ENT: Normal pharynx with no tonsillar hypertrophy. MMM.  NECK: Supple; non-tender; no cervical lymphadenopathy.   CHEST: Normal chest excursion with respiration.   CARDIOVASCULAR: Normal S1, S2; no murmurs, rubs, or gallops.   RESPIRATORY: Normal chest excursion with respiration; breath sounds clear and equal bilaterally; no wheezes, rhonchi, or rales.  NEURO: A & O x 4; CN 2-12 intact. Grossly unremarkable.

## 2023-11-03 NOTE — ED ADULT NURSE NOTE - NSFALLUNIVINTERV_ED_ALL_ED
Bed/Stretcher in lowest position, wheels locked, appropriate side rails in place/Call bell, personal items and telephone in reach/Instruct patient to call for assistance before getting out of bed/chair/stretcher/Non-slip footwear applied when patient is off stretcher/[place] to call system/Physically safe environment - no spills, clutter or unnecessary equipment/Purposeful proactive rounding/Room/bathroom lighting operational, light cord in reach

## 2023-11-03 NOTE — ED PROVIDER NOTE - NSFOLLOWUPINSTRUCTIONS_ED_ALL_ED_FT
UNFORTUNATELY, WE ARE UNABLE TO PROVIDE A REFILL OF YOUR CLONAZEPAM PRESCRIPTION. PLEASE FOLLOW UP WITH YOUR PSYCHIATRIST FOR FURTHER EVALUATION AND MANAGEMENT. THANK YOU.    ----------------------------    Anxiety    Generalized anxiety disorder (JAVIER) is a mental disorder. It is defined as anxiety that is not necessarily related to specific events or activities or is out of proportion to said events. Symptoms include restlessness, fatigue, difficulty concentrations, irritability and difficulty concentrating. It interferes with life functions, including relationships, work, and school. If you were started on a medication make sure to take exactly as prescribed and follow up with a psychiatrist.    SEEK IMMEDIATE MEDICAL CARE IF YOU HAVE THE FOLLOWING SYMPTOMS: thoughts about hurting killing yourself, thoughts about hurting or killing somebody else, hallucinations, or worsening depression.

## 2023-11-03 NOTE — ED PROVIDER NOTE - CARE PROVIDER_API CALL
Dr. Dowd (your psychiatrist),   Phone: (   )    -  Fax: (   )    -  Established Patient  Follow Up Time: 1-3 Days

## 2023-11-03 NOTE — ED PROVIDER NOTE - PROVIDER TOKENS
FREE:[LAST:[Dr. Dowd (your psychiatrist)],PHONE:[(   )    -],FAX:[(   )    -],FOLLOWUP:[1-3 Days],ESTABLISHEDPATIENT:[T]]

## 2023-11-03 NOTE — ED PROVIDER NOTE - CLINICAL SUMMARY MEDICAL DECISION MAKING FREE TEXT BOX
73-year-old man past medical history as documented presents to the ED for refill of his clonazepam prescription.  As per I stop, patient was prescribed 2 mg tablets, 60 quantity on 10/12.  Patient instructed to follow-up with his psychiatrist and given return instructions.

## 2023-11-03 NOTE — ED PROVIDER NOTE - OBJECTIVE STATEMENT
Pt is a 73M with a PMHx of bipolar disorder and anxiety (on clonazepam) presenting requesting medication refill of clonazepam. Per iStop, pt last filled 60ct 2mg tablets of clonazepam on 10/12 (prescribed 1 tablet BID). Pt states he ran out and feels anxious so presented to ED to request refill. No other symptoms.

## 2023-11-03 NOTE — ED PROVIDER NOTE - PHYSICAL EXAMINATION
CONSTITUTIONAL:  NAD;   SKIN:  warm, dry;   HEAD:  NCAT;   EYES:  NL inspection;   ENT:  MMM;   NECK: supple; normal ROM;   CARD:  RRR;   RESP:  CTAB;   ABD:  S/NT, no R/G;   MSK:  no extremity injury/deformity;   NEURO:  grossly unremarkable;   PSYCH:  + anxious appearing, othprocess and normal thought content, no evidence of responding to internal stimuli; no paranoia; cooperative, appropriate;

## 2023-11-03 NOTE — ED PROVIDER NOTE - PATIENT PORTAL LINK FT
You can access the FollowMyHealth Patient Portal offered by [facility] by registering at the following website: [email or web address]. By joining [facility]’s FollowMyHealth portal, you will also be able to view your health information using other applications (apps) compatible with our system.

## 2023-11-05 ENCOUNTER — EMERGENCY (EMERGENCY)
Facility: HOSPITAL | Age: 73
LOS: 0 days | Discharge: HOME | End: 2023-11-05
Attending: EMERGENCY MEDICINE
Payer: MEDICARE

## 2023-11-05 VITALS
HEIGHT: 68 IN | RESPIRATION RATE: 18 BRPM | SYSTOLIC BLOOD PRESSURE: 132 MMHG | OXYGEN SATURATION: 99 % | HEART RATE: 112 BPM | DIASTOLIC BLOOD PRESSURE: 79 MMHG | WEIGHT: 154.98 LBS | TEMPERATURE: 98 F

## 2023-11-05 DIAGNOSIS — F31.9 BIPOLAR DISORDER, UNSPECIFIED: ICD-10-CM

## 2023-11-05 DIAGNOSIS — Z98.890 OTHER SPECIFIED POSTPROCEDURAL STATES: Chronic | ICD-10-CM

## 2023-11-05 DIAGNOSIS — Z79.02 LONG TERM (CURRENT) USE OF ANTITHROMBOTICS/ANTIPLATELETS: ICD-10-CM

## 2023-11-05 DIAGNOSIS — F41.9 ANXIETY DISORDER, UNSPECIFIED: ICD-10-CM

## 2023-11-05 DIAGNOSIS — Z95.5 PRESENCE OF CORONARY ANGIOPLASTY IMPLANT AND GRAFT: ICD-10-CM

## 2023-11-05 DIAGNOSIS — I25.2 OLD MYOCARDIAL INFARCTION: ICD-10-CM

## 2023-11-05 DIAGNOSIS — Z91.138 PATIENT'S UNINTENTIONAL UNDERDOSING OF MEDICATION REGIMEN FOR OTHER REASON: ICD-10-CM

## 2023-11-05 DIAGNOSIS — Z79.01 LONG TERM (CURRENT) USE OF ANTICOAGULANTS: ICD-10-CM

## 2023-11-05 DIAGNOSIS — Z95.5 PRESENCE OF CORONARY ANGIOPLASTY IMPLANT AND GRAFT: Chronic | ICD-10-CM

## 2023-11-05 DIAGNOSIS — Z79.82 LONG TERM (CURRENT) USE OF ASPIRIN: ICD-10-CM

## 2023-11-05 PROCEDURE — 99283 EMERGENCY DEPT VISIT LOW MDM: CPT

## 2023-11-05 PROCEDURE — 99284 EMERGENCY DEPT VISIT MOD MDM: CPT | Mod: FS

## 2023-11-05 RX ORDER — CLONAZEPAM 1 MG
1 TABLET ORAL ONCE
Refills: 0 | Status: DISCONTINUED | OUTPATIENT
Start: 2023-11-05 | End: 2023-11-05

## 2023-11-05 RX ADMIN — Medication 1 MILLIGRAM(S): at 06:15

## 2023-11-05 NOTE — ED PROVIDER NOTE - OBJECTIVE STATEMENT
Patient is a 73 year old male with pmhx of bipolar d/o, anxiety, stent placement on plavix presents for anxiety and is requesting refill of his clonazepam. Patient states he is prescribed the meds from Dr Dowd Psychiatrist at [facility]. He states he ran out of his medications. He states he has an appointment tomorrow (Monday) with psychiatrist for refill. He denies any SI / HI / AVH or other complaints at this time.

## 2023-11-05 NOTE — ED ADULT NURSE NOTE - NSFALLUNIVINTERV_ED_ALL_ED
Bed/Stretcher in lowest position, wheels locked, appropriate side rails in place/Call bell, personal items and telephone in reach/Instruct patient to call for assistance before getting out of bed/chair/stretcher/Non-slip footwear applied when patient is off stretcher/Altha to call system/Physically safe environment - no spills, clutter or unnecessary equipment/Purposeful proactive rounding/Room/bathroom lighting operational, light cord in reach

## 2023-11-05 NOTE — ED PROVIDER NOTE - NS ED ATTENDING STATEMENT MOD
I have seen and examined this patient and fully participated in the care of this patient as the teaching attending.  The service was shared with the COOKIE.  I reviewed and verified the documentation and independently performed the documented:

## 2023-11-05 NOTE — ED ADULT TRIAGE NOTE - CHIEF COMPLAINT QUOTE
Patient requesting refill of Klonopin medication. states he's been taking it for anxiety and ran out

## 2023-11-05 NOTE — ED PROVIDER NOTE - PATIENT PORTAL LINK FT
You can access the FollowMyHealth Patient Portal offered by [facility] by registering at the following website: [email or web address]. By joining [employer]’s FollowMyHealth portal, you will also be able to view your health information using other applications (apps) compatible with our system.

## 2023-11-05 NOTE — ED PROVIDER NOTE - ATTENDING CONTRIBUTION TO CARE
73-year-old male, history of bipolar disorder, anxiety, ran out of Klonopin and feels very anxious.  Was in ED a few days ago for the same reason.  Has an appointment with us private psychiatrist tomorrow.  No SI/HI.  Denies alcohol use.  No seizures.  Exam shows alert patient, appears very anxious, in no distress, HEENT NCAT PERRL, neck supple, lungs clear, RR S1S2, abdomen soft NT +BS, no CCE, fine tremors. 73-year-old male, history of MI, cardiac stents, bipolar disorder, anxiety, ran out of Klonopin and feels very anxious.  Was in ED a few days ago for the same reason.  Has an appointment with his private psychiatrist tomorrow.  No SI/HI.  Denies alcohol use.  No seizures.  Exam shows alert patient, appears very anxious, in no distress, HEENT NCAT PERRL, neck supple, lungs clear, RR S1S2, abdomen soft NT +BS, no CCE, fine tremors.

## 2023-11-05 NOTE — ED PROVIDER NOTE - PHYSICAL EXAMINATION
As Follows:  CONST: Anxious appearing,   EYES: PERRL, EOMI, Sclera and conjunctiva clear.   CARD: No murmurs, rubs, or gallops; Normal rate and rhythm  RESP: BS Equal B/L, No wheezes, rhonchi or rales. No distress or accessory breathing  SKIN: Warm, dry, no acute rashes. MMM  NEURO: A&Ox3, No focal deficits. Strength and sensation intact.  PSYCH: Cooperative, anxious. No SI / HI / AVH. As Follows:  CONST: Anxious appearing,   EYES: PERRL, EOMI, Sclera and conjunctiva clear.   CARD: No murmurs, rubs, or gallops; Normal rate and rhythm  RESP: BS Equal B/L, No wheezes, rhonchi or rales. No distress or accessory breathing  SKIN: Warm, dry, no acute rashes. MMM  NEURO: A&Ox3, No focal deficits. Strength and sensation intact.  PSYCH: Cooperative, anxious. No SI / HI / AVH. Stuttered speech. Mild fasciculations.

## 2023-11-05 NOTE — ED PROVIDER NOTE - CLINICAL SUMMARY MEDICAL DECISION MAKING FREE TEXT BOX
73-year-old male, history of bipolar disorder, anxiety, ran out of Klonopin and feels very anxious.  No SI/HI.  No seizures.  Given 1 dose of Klonopin in ED.  Instructed to follow-up with private psychiatrist tomorrow. 73-year-old male, history of MI, cardiac stents, bipolar disorder, anxiety, ran out of Klonopin and feels very anxious.  No SI/HI.  No seizures.  Given 1 dose of Klonopin in ED.  Instructed to follow-up with private psychiatrist tomorrow.

## 2024-02-14 ENCOUNTER — EMERGENCY (EMERGENCY)
Facility: HOSPITAL | Age: 74
LOS: 0 days | Discharge: HOME | End: 2024-02-14
Attending: STUDENT IN AN ORGANIZED HEALTH CARE EDUCATION/TRAINING PROGRAM
Payer: MEDICARE

## 2024-02-14 VITALS
DIASTOLIC BLOOD PRESSURE: 80 MMHG | WEIGHT: 154.98 LBS | RESPIRATION RATE: 20 BRPM | SYSTOLIC BLOOD PRESSURE: 142 MMHG | TEMPERATURE: 98 F | HEART RATE: 76 BPM | OXYGEN SATURATION: 99 %

## 2024-02-14 DIAGNOSIS — I25.10 ATHEROSCLEROTIC HEART DISEASE OF NATIVE CORONARY ARTERY WITHOUT ANGINA PECTORIS: ICD-10-CM

## 2024-02-14 DIAGNOSIS — I45.10 UNSPECIFIED RIGHT BUNDLE-BRANCH BLOCK: ICD-10-CM

## 2024-02-14 DIAGNOSIS — M79.602 PAIN IN LEFT ARM: ICD-10-CM

## 2024-02-14 DIAGNOSIS — F31.9 BIPOLAR DISORDER, UNSPECIFIED: ICD-10-CM

## 2024-02-14 DIAGNOSIS — Z98.890 OTHER SPECIFIED POSTPROCEDURAL STATES: Chronic | ICD-10-CM

## 2024-02-14 DIAGNOSIS — Z79.02 LONG TERM (CURRENT) USE OF ANTITHROMBOTICS/ANTIPLATELETS: ICD-10-CM

## 2024-02-14 DIAGNOSIS — F41.9 ANXIETY DISORDER, UNSPECIFIED: ICD-10-CM

## 2024-02-14 DIAGNOSIS — Z79.82 LONG TERM (CURRENT) USE OF ASPIRIN: ICD-10-CM

## 2024-02-14 DIAGNOSIS — Z95.5 PRESENCE OF CORONARY ANGIOPLASTY IMPLANT AND GRAFT: ICD-10-CM

## 2024-02-14 DIAGNOSIS — Z95.5 PRESENCE OF CORONARY ANGIOPLASTY IMPLANT AND GRAFT: Chronic | ICD-10-CM

## 2024-02-14 DIAGNOSIS — Z79.01 LONG TERM (CURRENT) USE OF ANTICOAGULANTS: ICD-10-CM

## 2024-02-14 LAB
ALBUMIN SERPL ELPH-MCNC: 4.2 G/DL — SIGNIFICANT CHANGE UP (ref 3.5–5.2)
ALP SERPL-CCNC: 83 U/L — SIGNIFICANT CHANGE UP (ref 30–115)
ALT FLD-CCNC: 14 U/L — SIGNIFICANT CHANGE UP (ref 0–41)
ANION GAP SERPL CALC-SCNC: 12 MMOL/L — SIGNIFICANT CHANGE UP (ref 7–14)
AST SERPL-CCNC: 20 U/L — SIGNIFICANT CHANGE UP (ref 0–41)
BASOPHILS # BLD AUTO: 0.07 K/UL — SIGNIFICANT CHANGE UP (ref 0–0.2)
BASOPHILS NFR BLD AUTO: 0.7 % — SIGNIFICANT CHANGE UP (ref 0–1)
BILIRUB SERPL-MCNC: 0.5 MG/DL — SIGNIFICANT CHANGE UP (ref 0.2–1.2)
BUN SERPL-MCNC: 15 MG/DL — SIGNIFICANT CHANGE UP (ref 10–20)
CALCIUM SERPL-MCNC: 9.9 MG/DL — SIGNIFICANT CHANGE UP (ref 8.4–10.5)
CHLORIDE SERPL-SCNC: 108 MMOL/L — SIGNIFICANT CHANGE UP (ref 98–110)
CO2 SERPL-SCNC: 18 MMOL/L — SIGNIFICANT CHANGE UP (ref 17–32)
CREAT SERPL-MCNC: 1.2 MG/DL — SIGNIFICANT CHANGE UP (ref 0.7–1.5)
EGFR: 64 ML/MIN/1.73M2 — SIGNIFICANT CHANGE UP
EOSINOPHIL # BLD AUTO: 0.35 K/UL — SIGNIFICANT CHANGE UP (ref 0–0.7)
EOSINOPHIL NFR BLD AUTO: 3.5 % — SIGNIFICANT CHANGE UP (ref 0–8)
GLUCOSE SERPL-MCNC: 89 MG/DL — SIGNIFICANT CHANGE UP (ref 70–99)
HCT VFR BLD CALC: 46.2 % — SIGNIFICANT CHANGE UP (ref 42–52)
HGB BLD-MCNC: 16.3 G/DL — SIGNIFICANT CHANGE UP (ref 14–18)
IMM GRANULOCYTES NFR BLD AUTO: 0.3 % — SIGNIFICANT CHANGE UP (ref 0.1–0.3)
LYMPHOCYTES # BLD AUTO: 2.73 K/UL — SIGNIFICANT CHANGE UP (ref 1.2–3.4)
LYMPHOCYTES # BLD AUTO: 27.2 % — SIGNIFICANT CHANGE UP (ref 20.5–51.1)
MCHC RBC-ENTMCNC: 34.6 PG — HIGH (ref 27–31)
MCHC RBC-ENTMCNC: 35.3 G/DL — SIGNIFICANT CHANGE UP (ref 32–37)
MCV RBC AUTO: 98.1 FL — HIGH (ref 80–94)
MONOCYTES # BLD AUTO: 0.4 K/UL — SIGNIFICANT CHANGE UP (ref 0.1–0.6)
MONOCYTES NFR BLD AUTO: 4 % — SIGNIFICANT CHANGE UP (ref 1.7–9.3)
NEUTROPHILS # BLD AUTO: 6.47 K/UL — SIGNIFICANT CHANGE UP (ref 1.4–6.5)
NEUTROPHILS NFR BLD AUTO: 64.3 % — SIGNIFICANT CHANGE UP (ref 42.2–75.2)
NRBC # BLD: 0 /100 WBCS — SIGNIFICANT CHANGE UP (ref 0–0)
PLATELET # BLD AUTO: 201 K/UL — SIGNIFICANT CHANGE UP (ref 130–400)
PMV BLD: 9.8 FL — SIGNIFICANT CHANGE UP (ref 7.4–10.4)
POTASSIUM SERPL-MCNC: 4.7 MMOL/L — SIGNIFICANT CHANGE UP (ref 3.5–5)
POTASSIUM SERPL-SCNC: 4.7 MMOL/L — SIGNIFICANT CHANGE UP (ref 3.5–5)
PROT SERPL-MCNC: 6.9 G/DL — SIGNIFICANT CHANGE UP (ref 6–8)
RBC # BLD: 4.71 M/UL — SIGNIFICANT CHANGE UP (ref 4.7–6.1)
RBC # FLD: 13.4 % — SIGNIFICANT CHANGE UP (ref 11.5–14.5)
SODIUM SERPL-SCNC: 138 MMOL/L — SIGNIFICANT CHANGE UP (ref 135–146)
TROPONIN T, HIGH SENSITIVITY RESULT: 10 NG/L — SIGNIFICANT CHANGE UP (ref 6–21)
TROPONIN T, HIGH SENSITIVITY RESULT: 9 NG/L — SIGNIFICANT CHANGE UP (ref 6–21)
WBC # BLD: 10.05 K/UL — SIGNIFICANT CHANGE UP (ref 4.8–10.8)
WBC # FLD AUTO: 10.05 K/UL — SIGNIFICANT CHANGE UP (ref 4.8–10.8)

## 2024-02-14 PROCEDURE — 93010 ELECTROCARDIOGRAM REPORT: CPT

## 2024-02-14 PROCEDURE — 99284 EMERGENCY DEPT VISIT MOD MDM: CPT

## 2024-02-14 PROCEDURE — 36415 COLL VENOUS BLD VENIPUNCTURE: CPT

## 2024-02-14 PROCEDURE — 99285 EMERGENCY DEPT VISIT HI MDM: CPT | Mod: 25

## 2024-02-14 PROCEDURE — 93005 ELECTROCARDIOGRAM TRACING: CPT

## 2024-02-14 PROCEDURE — 71046 X-RAY EXAM CHEST 2 VIEWS: CPT

## 2024-02-14 PROCEDURE — 85025 COMPLETE CBC W/AUTO DIFF WBC: CPT

## 2024-02-14 PROCEDURE — 71046 X-RAY EXAM CHEST 2 VIEWS: CPT | Mod: 26

## 2024-02-14 PROCEDURE — 84484 ASSAY OF TROPONIN QUANT: CPT | Mod: 59

## 2024-02-14 PROCEDURE — 80053 COMPREHEN METABOLIC PANEL: CPT

## 2024-02-14 RX ORDER — ACETAMINOPHEN 500 MG
650 TABLET ORAL ONCE
Refills: 0 | Status: COMPLETED | OUTPATIENT
Start: 2024-02-14 | End: 2024-02-14

## 2024-02-14 NOTE — ED PROVIDER NOTE - ATTENDING APP SHARED VISIT CONTRIBUTION OF CARE
see mdm for attending note Hydroxychloroquine Counseling:  I discussed with the patient that a baseline ophthalmologic exam is needed at the start of therapy and every year thereafter while on therapy. A CBC may also be warranted for monitoring.  The side effects of this medication were discussed with the patient, including but not limited to agranulocytosis, aplastic anemia, seizures, rashes, retinopathy, and liver toxicity. Patient instructed to call the office should any adverse effect occur.  The patient verbalized understanding of the proper use and possible adverse effects of Plaquenil.  All the patient's questions and concerns were addressed.

## 2024-02-14 NOTE — ED PROVIDER NOTE - PROVIDER TOKENS
FREE:[LAST:[[facility]],PHONE:[(   )    -],FAX:[(   )    -],FOLLOWUP:[1-3 Days],ESTABLISHEDPATIENT:[T]]

## 2024-02-14 NOTE — ED ADULT NURSE NOTE - NSFALLRISKFACTORS_ED_ALL_ED
If provider orders tests at today's visit, patient would like to be contacted by telephone).  If to contact patient by phone, patient's preferred phone # is [phone] and it is ok to leave message on voice mail or with family member.  If medications are ordered at today's visit, the pharmacy name/location patient would like them to be sent to is [id number] - [place], [place] - [address] [address]    No indicators present

## 2024-02-14 NOTE — ED ADULT TRIAGE NOTE - CHIEF COMPLAINT QUOTE
sent in by [facility] for ekg changes. pt. c'o left arm pain radiating down. hx of MI and 2 stents

## 2024-02-14 NOTE — ED PROVIDER NOTE - CARE PROVIDER_API CALL
[facility],   Phone: (   )    -  Fax: (   )    -  Established Patient  Follow Up Time: 1-3 Days

## 2024-02-14 NOTE — ED PROVIDER NOTE - OBJECTIVE STATEMENT
73-year-old male with past medical history of CAD status post stents, bipolar disorder, anxiety presenting for evaluation of left arm pain since Saturday morning.  Patient went to urgent care and was sent to ED for further evaluation.  No chest pain or shortness of breath.  No leg swelling or calf pain. No fever, chills, abdominal pain, nausea, vomiting, diarrhea, back pain, urinary symptoms, headache, dizziness, paresthesias, or weakness.

## 2024-02-14 NOTE — ED PROVIDER NOTE - PROGRESS NOTE DETAILS
Dr. Jaz Ferrell, DO: Patient reassessed and symptoms have improved. Discussed results and patient comfortable with discharge home with close follow up.

## 2024-02-14 NOTE — ED PROVIDER NOTE - PATIENT PORTAL LINK FT
KE
You can access the FollowMyHealth Patient Portal offered by [facility] by registering at the following website: [email or web address]. By joining [employer]’s FollowMyHealth portal, you will also be able to view your health information using other applications (apps) compatible with our system.

## 2024-02-14 NOTE — ED ADULT TRIAGE NOTE - HEART RATE (BEATS/MIN)
Georgi Keenan  1951   Chief Complaint   Patient presents with    Shoulder Pain     right    Follow-up     2 month        HISTORY OF PRESENT ILLNESS   Georgi Keenan is a 79 y.o. female who presents today for reevaluation of right shoulder/scapula pain. She was last seen by Dr. Salomón Sousa who ordered PT and rx NSAIDs. She also had an MRI of the right shoulder that showed tendonitis and no full thickness tears of the rotator cuff. She has gone to 1 session of PT and comes in today for follow up. She is having achy pain around the scapula area. She thinks the session of PT she has gone to has helped. She also feels like the rx NSAIDs helps some also. Denies numbness, tingling, burning, pain with overhead movements. She has trouble sleeping at night, she can not roll over on her right side at night. Patient denies any fever, chills, chest pain, shortness of breath or calf pain. There are no new illness or injuries to report since last seen in the office. No changes in medications, allergies, social or family history. PHYSICAL EXAM:   Visit Vitals  BP (!) 125/92   Pulse 94   Temp 98.3 °F (36.8 °C) (Oral)   Resp 14   Ht 5' 5\" (1.651 m)   Wt 204 lb 3.2 oz (92.6 kg)   SpO2 98%   BMI 33.98 kg/m²     The patient is a well-developed, well-nourished female   in no acute distress. The patient is alert and oriented times three. Mood and affect are normal.  LYMPHATIC: lymph nodes are not enlarged and are within normal limits  SKIN: normal in color and non tender to palpation. There are no bruises or abrasions noted. NEUROLOGICAL: Motor sensory exam is within normal limits. Reflexes are equal bilaterally.  There is normal sensation to pinprick and light touch  MUSCULOSKELETAL:  Examination Right shoulder   Skin Intact   AC joint tenderness -   Biceps tenderness -   Forward flexion/Elevation    Active abduction    Glenohumeral abduction 90   External rotation ROM 50   Internal rotation ROM Lower lumbar   Apprehension -   Tristons Relocation -   Jerk -   Load and Shift -   Obriens -   Speeds -   Impingement sign -   Supraspinatus/Empty Can +, 5/5   External Rotation Strength -, 5/5   Lift Off/Belly Press -, 5/5   Neurovascular Intact      Examination Neck   Skin Intact   Tenderness, Paracervical +   Paracervical spasms  -   Flexion Normal   Extension Normal   Lateral bend left Normal   Lateral bend right Normal   Masses -   Spurling sign -   Biceps reflex Normal   Triceps reflex Normal   Brachioradialis reflex Normal   Sensation Normal     TTP to the right scapula, paraspinous musculature    IMPRESSION:      ICD-10-CM ICD-9-CM    1. Rotator cuff syndrome, right M75.101 726.10    2. Cervical radiculopathy M54.12 723.4    3. Pain of right scapula M89.8X1 733.90         PLAN:   Pt having right scapula pain  I think her pain is coming from the neck. Her shoulder pain is improving with PT and rx NSAID. She will continue this. I will prescribe volteran gel to use on her back/scapula to help with pain. She is in pain management and receives percocet.   RTC for her regular follow up in a month with Dr. Cj Gooden, [facility]

## 2024-02-14 NOTE — ED ADULT NURSE NOTE - NSFALLRISKINTERV_ED_ALL_ED

## 2024-02-14 NOTE — ED PROVIDER NOTE - CLINICAL SUMMARY MEDICAL DECISION MAKING FREE TEXT BOX
73-year-old male presenting today with left arm pain since Saturday.  Patient is hemodynamically stable and well-appearing on evaluation.  Patient has no chest pain, shortness of breath.  Patient's troponin noted to be negative x 2.  Patient had improvement in symptomatology of arm pain.  Patient discharged to follow-up with PMD.  Return precautions explained patient    Independent interpretation of the Xray film(s) performed by: Nate Arrieta.     Interpretation: Chest XR negative - no acute infiltrate, no pneumothorax    EKG interpretation:  RBBB, no ST elevations, no t wave inversions as interpreted by Nate Arrieta DO

## 2024-02-14 NOTE — ED ADULT NURSE NOTE - CHIEF COMPLAINT QUOTE
[place] [facility] [place]. pt. c'o left arm pain radiating down. hx of MI and 2 stents

## 2024-02-27 ENCOUNTER — RX RENEWAL (OUTPATIENT)
Age: 74
End: 2024-02-27

## 2024-02-27 RX ORDER — CLOPIDOGREL BISULFATE 75 MG/1
75 TABLET, FILM COATED ORAL DAILY
Qty: 90 | Refills: 3 | Status: ACTIVE | COMMUNITY
Start: 2022-10-04 | End: 1900-01-01

## 2024-06-12 NOTE — ED ADULT NURSE NOTE - NS ED NURSE TRANSPORT WITH
Will check for immunity and offer Hepatitis B vaccine as needed.   Cardiac Monitor/Defib/ACLS/Rescue Kit/O2/BVM

## 2024-10-09 ENCOUNTER — EMERGENCY (EMERGENCY)
Facility: HOSPITAL | Age: 74
LOS: 0 days | Discharge: HOME | End: 2024-10-10
Attending: EMERGENCY MEDICINE
Payer: MEDICARE

## 2024-10-09 VITALS
SYSTOLIC BLOOD PRESSURE: 153 MMHG | OXYGEN SATURATION: 99 % | TEMPERATURE: 98 F | HEART RATE: 105 BPM | DIASTOLIC BLOOD PRESSURE: 92 MMHG | RESPIRATION RATE: 20 BRPM | WEIGHT: 149.91 LBS

## 2024-10-09 DIAGNOSIS — F31.9 BIPOLAR DISORDER, UNSPECIFIED: ICD-10-CM

## 2024-10-09 DIAGNOSIS — R53.1 WEAKNESS: ICD-10-CM

## 2024-10-09 DIAGNOSIS — Z95.5 PRESENCE OF CORONARY ANGIOPLASTY IMPLANT AND GRAFT: Chronic | ICD-10-CM

## 2024-10-09 DIAGNOSIS — B34.9 VIRAL INFECTION, UNSPECIFIED: ICD-10-CM

## 2024-10-09 DIAGNOSIS — R50.9 FEVER, UNSPECIFIED: ICD-10-CM

## 2024-10-09 DIAGNOSIS — R05.9 COUGH, UNSPECIFIED: ICD-10-CM

## 2024-10-09 DIAGNOSIS — Z98.890 OTHER SPECIFIED POSTPROCEDURAL STATES: Chronic | ICD-10-CM

## 2024-10-09 LAB
ALBUMIN SERPL ELPH-MCNC: 4.2 G/DL — SIGNIFICANT CHANGE UP (ref 3.5–5.2)
ALP SERPL-CCNC: 88 U/L — SIGNIFICANT CHANGE UP (ref 30–115)
ALT FLD-CCNC: 8 U/L — SIGNIFICANT CHANGE UP (ref 0–41)
ANION GAP SERPL CALC-SCNC: 10 MMOL/L — SIGNIFICANT CHANGE UP (ref 7–14)
APPEARANCE UR: CLEAR — SIGNIFICANT CHANGE UP
AST SERPL-CCNC: 14 U/L — SIGNIFICANT CHANGE UP (ref 0–41)
BASOPHILS # BLD AUTO: 0.06 K/UL — SIGNIFICANT CHANGE UP (ref 0–0.2)
BASOPHILS NFR BLD AUTO: 0.6 % — SIGNIFICANT CHANGE UP (ref 0–1)
BILIRUB SERPL-MCNC: 0.5 MG/DL — SIGNIFICANT CHANGE UP (ref 0.2–1.2)
BILIRUB UR-MCNC: NEGATIVE — SIGNIFICANT CHANGE UP
BUN SERPL-MCNC: 12 MG/DL — SIGNIFICANT CHANGE UP (ref 10–20)
CALCIUM SERPL-MCNC: 10 MG/DL — SIGNIFICANT CHANGE UP (ref 8.4–10.5)
CHLORIDE SERPL-SCNC: 107 MMOL/L — SIGNIFICANT CHANGE UP (ref 98–110)
CO2 SERPL-SCNC: 21 MMOL/L — SIGNIFICANT CHANGE UP (ref 17–32)
COLOR SPEC: YELLOW — SIGNIFICANT CHANGE UP
CREAT SERPL-MCNC: 1.3 MG/DL — SIGNIFICANT CHANGE UP (ref 0.7–1.5)
DIFF PNL FLD: NEGATIVE — SIGNIFICANT CHANGE UP
EGFR: 58 ML/MIN/1.73M2 — LOW
EOSINOPHIL # BLD AUTO: 0.38 K/UL — SIGNIFICANT CHANGE UP (ref 0–0.7)
EOSINOPHIL NFR BLD AUTO: 4.1 % — SIGNIFICANT CHANGE UP (ref 0–8)
FLUAV AG NPH QL: SIGNIFICANT CHANGE UP
FLUBV AG NPH QL: SIGNIFICANT CHANGE UP
GLUCOSE SERPL-MCNC: 95 MG/DL — SIGNIFICANT CHANGE UP (ref 70–99)
GLUCOSE UR QL: NEGATIVE MG/DL — SIGNIFICANT CHANGE UP
HCT VFR BLD CALC: 45.8 % — SIGNIFICANT CHANGE UP (ref 42–52)
HGB BLD-MCNC: 16.1 G/DL — SIGNIFICANT CHANGE UP (ref 14–18)
IMM GRANULOCYTES NFR BLD AUTO: 0.2 % — SIGNIFICANT CHANGE UP (ref 0.1–0.3)
KETONES UR-MCNC: NEGATIVE MG/DL — SIGNIFICANT CHANGE UP
LEUKOCYTE ESTERASE UR-ACNC: NEGATIVE — SIGNIFICANT CHANGE UP
LIDOCAIN IGE QN: 36 U/L — SIGNIFICANT CHANGE UP (ref 7–60)
LITHIUM SERPL-MCNC: 1.8 MMOL/L — CRITICAL HIGH (ref 0.6–1.2)
LYMPHOCYTES # BLD AUTO: 2.31 K/UL — SIGNIFICANT CHANGE UP (ref 1.2–3.4)
LYMPHOCYTES # BLD AUTO: 24.7 % — SIGNIFICANT CHANGE UP (ref 20.5–51.1)
MAGNESIUM SERPL-MCNC: 2.2 MG/DL — SIGNIFICANT CHANGE UP (ref 1.8–2.4)
MCHC RBC-ENTMCNC: 33.8 PG — HIGH (ref 27–31)
MCHC RBC-ENTMCNC: 35.2 G/DL — SIGNIFICANT CHANGE UP (ref 32–37)
MCV RBC AUTO: 96.2 FL — HIGH (ref 80–94)
MONOCYTES # BLD AUTO: 0.45 K/UL — SIGNIFICANT CHANGE UP (ref 0.1–0.6)
MONOCYTES NFR BLD AUTO: 4.8 % — SIGNIFICANT CHANGE UP (ref 1.7–9.3)
NEUTROPHILS # BLD AUTO: 6.12 K/UL — SIGNIFICANT CHANGE UP (ref 1.4–6.5)
NEUTROPHILS NFR BLD AUTO: 65.6 % — SIGNIFICANT CHANGE UP (ref 42.2–75.2)
NITRITE UR-MCNC: NEGATIVE — SIGNIFICANT CHANGE UP
NRBC # BLD: 0 /100 WBCS — SIGNIFICANT CHANGE UP (ref 0–0)
PH UR: 6.5 — SIGNIFICANT CHANGE UP (ref 5–8)
PLATELET # BLD AUTO: 193 K/UL — SIGNIFICANT CHANGE UP (ref 130–400)
PMV BLD: 8.8 FL — SIGNIFICANT CHANGE UP (ref 7.4–10.4)
POTASSIUM SERPL-MCNC: 3.8 MMOL/L — SIGNIFICANT CHANGE UP (ref 3.5–5)
POTASSIUM SERPL-SCNC: 3.8 MMOL/L — SIGNIFICANT CHANGE UP (ref 3.5–5)
PROT SERPL-MCNC: 6.8 G/DL — SIGNIFICANT CHANGE UP (ref 6–8)
PROT UR-MCNC: NEGATIVE MG/DL — SIGNIFICANT CHANGE UP
RBC # BLD: 4.76 M/UL — SIGNIFICANT CHANGE UP (ref 4.7–6.1)
RBC # FLD: 13 % — SIGNIFICANT CHANGE UP (ref 11.5–14.5)
RSV RNA NPH QL NAA+NON-PROBE: SIGNIFICANT CHANGE UP
SARS-COV-2 RNA SPEC QL NAA+PROBE: SIGNIFICANT CHANGE UP
SODIUM SERPL-SCNC: 138 MMOL/L — SIGNIFICANT CHANGE UP (ref 135–146)
SP GR SPEC: 1.01 — SIGNIFICANT CHANGE UP (ref 1–1.03)
UROBILINOGEN FLD QL: 0.2 MG/DL — SIGNIFICANT CHANGE UP (ref 0.2–1)
WBC # BLD: 9.34 K/UL — SIGNIFICANT CHANGE UP (ref 4.8–10.8)
WBC # FLD AUTO: 9.34 K/UL — SIGNIFICANT CHANGE UP (ref 4.8–10.8)

## 2024-10-09 PROCEDURE — 93010 ELECTROCARDIOGRAM REPORT: CPT

## 2024-10-09 PROCEDURE — 87086 URINE CULTURE/COLONY COUNT: CPT

## 2024-10-09 PROCEDURE — 80178 ASSAY OF LITHIUM: CPT

## 2024-10-09 PROCEDURE — 81003 URINALYSIS AUTO W/O SCOPE: CPT

## 2024-10-09 PROCEDURE — 83735 ASSAY OF MAGNESIUM: CPT

## 2024-10-09 PROCEDURE — 0241U: CPT

## 2024-10-09 PROCEDURE — 99284 EMERGENCY DEPT VISIT MOD MDM: CPT | Mod: FS

## 2024-10-09 PROCEDURE — 71046 X-RAY EXAM CHEST 2 VIEWS: CPT | Mod: 26

## 2024-10-09 PROCEDURE — 93005 ELECTROCARDIOGRAM TRACING: CPT

## 2024-10-09 PROCEDURE — 71046 X-RAY EXAM CHEST 2 VIEWS: CPT

## 2024-10-09 PROCEDURE — 83690 ASSAY OF LIPASE: CPT

## 2024-10-09 PROCEDURE — 99285 EMERGENCY DEPT VISIT HI MDM: CPT | Mod: 25

## 2024-10-09 PROCEDURE — 36415 COLL VENOUS BLD VENIPUNCTURE: CPT

## 2024-10-09 PROCEDURE — 80053 COMPREHEN METABOLIC PANEL: CPT

## 2024-10-09 PROCEDURE — 85025 COMPLETE CBC W/AUTO DIFF WBC: CPT

## 2024-10-09 RX ORDER — SODIUM CHLORIDE 0.9 % (FLUSH) 0.9 %
1000 SYRINGE (ML) INJECTION ONCE
Refills: 0 | Status: COMPLETED | OUTPATIENT
Start: 2024-10-09 | End: 2024-10-09

## 2024-10-09 RX ADMIN — Medication 2000 MILLILITER(S): at 21:23

## 2024-10-09 NOTE — ED PROVIDER NOTE - CARE PLAN
Assessment and plan of treatment:	weakness  labs, check lithium level, imaging, supportive care   Principal Discharge DX:	Viral illness  Assessment and plan of treatment:	weakness  labs, check lithium level, imaging, supportive care   1

## 2024-10-09 NOTE — ED PROVIDER NOTE - ATTENDING APP SHARED VISIT CONTRIBUTION OF CARE
pt co 5 days of gen weakness, lightheaded, chills.  no fever. no trauma. no cp or sob. no ab pain. no n, v. no focal weakness no numbness.     VITAL SIGNS: I have reviewed nursing notes and confirm.  CONSTITUTIONAL: Well-developed; well-nourished; in no acute distress.  SKIN: Skin exam is warm and dry, no acute rash.  HEAD: Normocephalic; atraumatic.  EYES: PERRL, EOM intact; conjunctiva and sclera clear.  ENT: No nasal discharge; airway clear.   NECK: Supple; non tender.  CARD:+ S1, S2   RESP: No wheezes, rales or rhonchi.  ABD: Normal bowel sounds; soft; non-distended; non-tender;  EXT: Normal ROM. No cyanosis or edema.  LYMPH: No acute adenopathy.  NEURO: Alert. Grossly unremarkable. No focal deficits.  PSYCH: Cooperative, appropriate.

## 2024-10-09 NOTE — ED PROVIDER NOTE - NSFOLLOWUPINSTRUCTIONS_ED_ALL_ED_FT
Follow up with your primary care doctor in 1-2 days     Viral Illness, Adult  Viruses are tiny germs that can get into a person's body and cause illness. There are many different types of viruses, and they cause many types of illness. Viral illnesses can range from mild to severe. They can affect various parts of the body.    Common illnesses that are caused by a virus include colds and the flu. Viral illnesses also include serious conditions such as HIV/AIDS (human immunodeficiency virus/acquired immunodeficiency syndrome). A few viruses have been linked to certain cancers.    What are the causes?  Many types of viruses can cause illness. Viruses invade cells in your body, multiply, and cause the infected cells to malfunction or die. When the cell dies, it releases more of the virus. When this happens, you develop symptoms of the illness, and the virus continues to spread to other cells. If the virus takes over the function of the cell, it can cause the cell to divide and grow out of control, as is the case when a virus causes cancer.    Different viruses get into the body in different ways. You can get a virus by:    Swallowing food or water that is contaminated with the virus.  Breathing in droplets that have been coughed or sneezed into the air by an infected person.  Touching a surface that has been contaminated with the virus and then touching your eyes, nose, or mouth.  Being bitten by an insect or animal that carries the virus.  Having sexual contact with a person who is infected with the virus.  Being exposed to blood or fluids that contain the virus, either through an open cut or during a transfusion.    If a virus enters your body, your body's defense system (immune system) will try to fight the virus. You may be at higher risk for a viral illness if your immune system is weak.    What are the signs or symptoms?  Symptoms vary depending on the type of virus and the location of the cells that it invades. Common symptoms of the main types of viral illnesses include:    Cold and flu viruses     Fever.  Headache.  Sore throat.  Muscle aches.  Nasal congestion.  Cough.  Digestive system (gastrointestinal) viruses     Fever.  Abdominal pain.  Nausea.  Diarrhea.  Liver viruses (hepatitis)     Loss of appetite.  Tiredness.  Yellowing of the skin (jaundice).  Brain and spinal cord viruses     Fever.  Headache.  Stiff neck.  Nausea and vomiting.  Confusion or sleepiness.  Skin viruses     Warts.  Itching.  Rash.  Sexually transmitted viruses     Discharge.  Swelling.  Redness.  Rash.  How is this treated?  Viruses can be difficult to treat because they live within cells. Antibiotic medicines do not treat viruses because these drugs do not get inside cells. Treatment for a viral illness may include:    Resting and drinking plenty of fluids.  Medicines to relieve symptoms. These can include over-the-counter medicine for pain and fever, medicines for cough or congestion, and medicines to relieve diarrhea.  Antiviral medicines. These drugs are available only for certain types of viruses. They may help reduce flu symptoms if taken early. There are also many antiviral medicines for hepatitis and HIV/AIDS.    Some viral illnesses can be prevented with vaccinations. A common example is the flu shot.    Follow these instructions at home:  Medicines     Image   Take over-the-counter and prescription medicines only as told by your health care provider.  If you were prescribed an antiviral medicine, take it as told by your health care provider. Do not stop taking the medicine even if you start to feel better.  Be aware of when antibiotics are needed and when they are not needed. Antibiotics do not treat viruses. If your health care provider thinks that you may have a bacterial infection as well as a viral infection, you may get an antibiotic.    Do not ask for an antibiotic prescription if you have been diagnosed with a viral illness. That will not make your illness go away faster.  Frequently taking antibiotics when they are not needed can lead to antibiotic resistance. When this develops, the medicine no longer works against the bacteria that it normally fights.    General instructions     Drink enough fluids to keep your urine clear or pale yellow.  Rest as much as possible.  Return to your normal activities as told by your health care provider. Ask your health care provider what activities are safe for you.  Keep all follow-up visits as told by your health care provider. This is important.  How is this prevented?  ImageTake these actions to reduce your risk of viral infection:    Eat a healthy diet and get enough rest.  Wash your hands often with soap and water. This is especially important when you are in public places. If soap and water are not available, use hand .  Avoid close contact with friends and family who have a viral illness.  If you travel to areas where viral gastrointestinal infection is common, avoid drinking water or eating raw food.  Keep your immunizations up to date. Get a flu shot every year as told by your health care provider.  Do not share toothbrushes, nail clippers, razors, or needles with other people.  Always practice safe sex.    Contact a health care provider if:  You have symptoms of a viral illness that do not go away.  Your symptoms come back after going away.  Your symptoms get worse.  Get help right away if:  You have trouble breathing.  You have a severe headache or a stiff neck.  You have severe vomiting or abdominal pain.  This information is not intended to replace advice given to you by your health care provider. Make sure you discuss any questions you have with your health care provider.

## 2024-10-09 NOTE — ED PROVIDER NOTE - OBJECTIVE STATEMENT
74 year old male past medical history of bipolar on lithium with CKD comes to emergency room for fever/chills, cough, weakness with diarrhea for the last few days. no chest pain no shortness of breath.

## 2024-10-09 NOTE — ED ADULT NURSE NOTE - NSFALLUNIVINTERV_ED_ALL_ED
show Bed/Stretcher in lowest position, wheels locked, appropriate side rails in place/Call bell, personal items and telephone in reach/Instruct patient to call for assistance before getting out of bed/chair/stretcher/Non-slip footwear applied when patient is off stretcher/Edgar to call system/Physically safe environment - no spills, clutter or unnecessary equipment/Purposeful proactive rounding/Room/bathroom lighting operational, light cord in reach

## 2024-10-09 NOTE — ED PROVIDER NOTE - PATIENT PORTAL LINK FT
You can access the FollowMyHealth Patient Portal offered by [facility] by registering at the following website: [email or web address]. By joining [employer]’s FollowMyHealth portal, you will also be able to view your health information using other applications (apps) compatible with our system.

## 2024-10-09 NOTE — ED ADULT NURSE NOTE - NS ED PATIENT SAFETY CONCERN
No new care gaps identified.  Powered by [employer] by [facility]. Reference number: [id number].   1/16/2022 8:04:34 AM CST   No

## 2024-10-09 NOTE — ED PROVIDER NOTE - PHYSICAL EXAMINATION
Physical Exam    Vital Signs: I have reviewed the initial vital signs.  Constitutional: well-nourished, appears stated age, no acute distress  Eyes: Conjunctiva pink, Sclera clear, PERRLA, EOMI.  ears: tms clear  Throat: no swelling uvula midline, no eyerhmea no exudates   Cardiovascular: S1 and S2, regular rate, regular rhythm, well-perfused extremities, radial pulses equal and 2+  Respiratory: unlabored respiratory effort, clear to auscultation bilaterally no wheezing, rales and rhonchi  Gastrointestinal: soft, non-tender abdomen, no pulsatile mass, normal bowl sounds  Musculoskeletal: supple neck, no lower extremity edema, no midline tenderness  Integumentary: warm, dry, no rash  Neurologic: awake, alert, cranial nerves II-XII grossly intact, extremities’ motor and sensory functions grossly intact  Psychiatric: appropriate mood, appropriate affect

## 2024-10-10 VITALS
RESPIRATION RATE: 18 BRPM | OXYGEN SATURATION: 99 % | DIASTOLIC BLOOD PRESSURE: 98 MMHG | SYSTOLIC BLOOD PRESSURE: 160 MMHG | TEMPERATURE: 98 F | HEART RATE: 88 BPM

## 2024-10-11 LAB
CULTURE RESULTS: SIGNIFICANT CHANGE UP
SPECIMEN SOURCE: SIGNIFICANT CHANGE UP

## 2024-11-12 ENCOUNTER — EMERGENCY (EMERGENCY)
Facility: HOSPITAL | Age: 74
LOS: 0 days | Discharge: HOME | End: 2024-11-12
Attending: EMERGENCY MEDICINE
Payer: MEDICARE

## 2024-11-12 VITALS
DIASTOLIC BLOOD PRESSURE: 101 MMHG | WEIGHT: 145.06 LBS | HEART RATE: 92 BPM | TEMPERATURE: 99 F | RESPIRATION RATE: 16 BRPM | OXYGEN SATURATION: 96 % | SYSTOLIC BLOOD PRESSURE: 166 MMHG

## 2024-11-12 DIAGNOSIS — F31.9 BIPOLAR DISORDER, UNSPECIFIED: ICD-10-CM

## 2024-11-12 DIAGNOSIS — R42 DIZZINESS AND GIDDINESS: ICD-10-CM

## 2024-11-12 DIAGNOSIS — R51.9 HEADACHE, UNSPECIFIED: ICD-10-CM

## 2024-11-12 DIAGNOSIS — Z95.5 PRESENCE OF CORONARY ANGIOPLASTY IMPLANT AND GRAFT: Chronic | ICD-10-CM

## 2024-11-12 DIAGNOSIS — Z98.890 OTHER SPECIFIED POSTPROCEDURAL STATES: Chronic | ICD-10-CM

## 2024-11-12 LAB
ALBUMIN SERPL ELPH-MCNC: 4.6 G/DL — SIGNIFICANT CHANGE UP (ref 3.5–5.2)
ALP SERPL-CCNC: 95 U/L — SIGNIFICANT CHANGE UP (ref 30–115)
ALT FLD-CCNC: 11 U/L — SIGNIFICANT CHANGE UP (ref 0–41)
ANION GAP SERPL CALC-SCNC: 10 MMOL/L — SIGNIFICANT CHANGE UP (ref 7–14)
AST SERPL-CCNC: 23 U/L — SIGNIFICANT CHANGE UP (ref 0–41)
BILIRUB SERPL-MCNC: 0.6 MG/DL — SIGNIFICANT CHANGE UP (ref 0.2–1.2)
BUN SERPL-MCNC: 16 MG/DL — SIGNIFICANT CHANGE UP (ref 10–20)
CALCIUM SERPL-MCNC: 10.5 MG/DL — HIGH (ref 8.4–10.4)
CHLORIDE SERPL-SCNC: 107 MMOL/L — SIGNIFICANT CHANGE UP (ref 98–110)
CO2 SERPL-SCNC: 20 MMOL/L — SIGNIFICANT CHANGE UP (ref 17–32)
CREAT SERPL-MCNC: 1.3 MG/DL — SIGNIFICANT CHANGE UP (ref 0.7–1.5)
EGFR: 58 ML/MIN/1.73M2 — LOW
GLUCOSE SERPL-MCNC: 92 MG/DL — SIGNIFICANT CHANGE UP (ref 70–99)
HCT VFR BLD CALC: 48.3 % — SIGNIFICANT CHANGE UP (ref 42–52)
HGB BLD-MCNC: 16.7 G/DL — SIGNIFICANT CHANGE UP (ref 14–18)
MCHC RBC-ENTMCNC: 34.4 PG — HIGH (ref 27–31)
MCHC RBC-ENTMCNC: 34.6 G/DL — SIGNIFICANT CHANGE UP (ref 32–37)
MCV RBC AUTO: 99.6 FL — HIGH (ref 80–94)
NRBC # BLD: 0 /100 WBCS — SIGNIFICANT CHANGE UP (ref 0–0)
PLATELET # BLD AUTO: 255 K/UL — SIGNIFICANT CHANGE UP (ref 130–400)
PMV BLD: 9.4 FL — SIGNIFICANT CHANGE UP (ref 7.4–10.4)
POTASSIUM SERPL-MCNC: 4.7 MMOL/L — SIGNIFICANT CHANGE UP (ref 3.5–5)
POTASSIUM SERPL-SCNC: 4.7 MMOL/L — SIGNIFICANT CHANGE UP (ref 3.5–5)
PROT SERPL-MCNC: 7.5 G/DL — SIGNIFICANT CHANGE UP (ref 6–8)
RBC # BLD: 4.85 M/UL — SIGNIFICANT CHANGE UP (ref 4.7–6.1)
RBC # FLD: 13.4 % — SIGNIFICANT CHANGE UP (ref 11.5–14.5)
SODIUM SERPL-SCNC: 137 MMOL/L — SIGNIFICANT CHANGE UP (ref 135–146)
WBC # BLD: 12.53 K/UL — HIGH (ref 4.8–10.8)
WBC # FLD AUTO: 12.53 K/UL — HIGH (ref 4.8–10.8)

## 2024-11-12 PROCEDURE — 80053 COMPREHEN METABOLIC PANEL: CPT

## 2024-11-12 PROCEDURE — 70450 CT HEAD/BRAIN W/O DYE: CPT | Mod: 26,MC

## 2024-11-12 PROCEDURE — 70450 CT HEAD/BRAIN W/O DYE: CPT | Mod: MC

## 2024-11-12 PROCEDURE — 85027 COMPLETE CBC AUTOMATED: CPT

## 2024-11-12 PROCEDURE — 36000 PLACE NEEDLE IN VEIN: CPT

## 2024-11-12 PROCEDURE — 36415 COLL VENOUS BLD VENIPUNCTURE: CPT

## 2024-11-12 PROCEDURE — 99284 EMERGENCY DEPT VISIT MOD MDM: CPT | Mod: 25

## 2024-11-12 PROCEDURE — 99284 EMERGENCY DEPT VISIT MOD MDM: CPT

## 2024-11-12 RX ORDER — METOCLOPRAMIDE HCL 10 MG
10 TABLET ORAL ONCE
Refills: 0 | Status: DISCONTINUED | OUTPATIENT
Start: 2024-11-12 | End: 2024-11-12

## 2024-11-12 RX ORDER — ACETAMINOPHEN 500 MG
975 TABLET ORAL ONCE
Refills: 0 | Status: COMPLETED | OUTPATIENT
Start: 2024-11-12 | End: 2024-11-12

## 2024-11-12 RX ADMIN — Medication 975 MILLIGRAM(S): at 13:13

## 2024-11-12 NOTE — ED PROVIDER NOTE - OBJECTIVE STATEMENT
Patient is a 74 year old male with PMH of Bipolar Disorder, Anxiety, and CKD presenting for a headache. Patient endorses a throbbing head pressure to the top of his head, associated with intermittent episodes of lightheadedness x1 week. Patient denies neck pain, syncope, vision changes, nausea, vomiting, chest pain, shortness of breath, abdominal pain, back pain, urinary symptoms, fevers, or additional complaints.

## 2024-11-12 NOTE — ED PROVIDER NOTE - DIFFERENTIAL DIAGNOSIS
Differential Diagnosis Subarachnoid hemorrhage, Meningitis/Encephalitis, acute angle closure glaucoma, temporal arteritis, increased intracranial pressure, migraine, tension headache, cluster headache, car monoxide, cerebral venous thrombosis

## 2024-11-12 NOTE — ED PROVIDER NOTE - CLINICAL SUMMARY MEDICAL DECISION MAKING FREE TEXT BOX
Patient here present with throbbing headache  for 1 week..  Neurologic exam is unremarkable CT head is negative.  Patient feels better after meds discharged home.

## 2024-11-12 NOTE — ED PROVIDER NOTE - PATIENT PORTAL LINK FT
You can access the FollowMyHealth Patient Portal offered by [facility] by registering at the following website: [email or web address]. By joining [employer]’s FollowMyHealth portal, you will also be able to view your health information using other applications (apps) compatible with our system.

## 2024-11-12 NOTE — ED PROVIDER NOTE - PHYSICAL EXAMINATION
VITAL SIGNS: I have reviewed nursing notes and confirm.  CONSTITUTIONAL: Well-appearing, non-toxic, in NAD  SKIN: Warm dry, normal skin turgor  HEAD: NCAT  EYES: No conjunctival injection, scleral anicteric. PERRLA, EOMI  ENT: Moist mucous membranes, normal pharynx with no erythema or exudates  NECK: Supple; full ROM. Nontender. No cervical LAD  CARD: RRR, no murmurs, rubs or gallops  RESP: Clear to ausculation bilaterally.  No rales, rhonchi, or wheezing.  ABD: Soft, non-distended, non-tender, no rebound or guarding. No CVA tenderness  EXT: Full ROM,  NEURO: Normal motor, normal sensory. CN II-XII grossly intact. Cerebellar testing normal. Normal gait. Resting tremors at baseline

## 2024-11-14 ENCOUNTER — EMERGENCY (EMERGENCY)
Facility: HOSPITAL | Age: 74
LOS: 0 days | Discharge: LEFT BEFORE DISCHARGE | End: 2024-11-14
Attending: EMERGENCY MEDICINE

## 2024-11-14 VITALS
DIASTOLIC BLOOD PRESSURE: 93 MMHG | HEART RATE: 94 BPM | OXYGEN SATURATION: 98 % | SYSTOLIC BLOOD PRESSURE: 137 MMHG | RESPIRATION RATE: 18 BRPM | TEMPERATURE: 98 F

## 2024-11-14 DIAGNOSIS — Z95.5 PRESENCE OF CORONARY ANGIOPLASTY IMPLANT AND GRAFT: Chronic | ICD-10-CM

## 2024-11-14 DIAGNOSIS — Z98.890 OTHER SPECIFIED POSTPROCEDURAL STATES: Chronic | ICD-10-CM

## 2024-11-14 PROCEDURE — 99282 EMERGENCY DEPT VISIT SF MDM: CPT

## 2024-11-14 PROCEDURE — 99283 EMERGENCY DEPT VISIT LOW MDM: CPT | Mod: FS

## 2024-11-14 RX ORDER — CLONAZEPAM 1 MG
1 TABLET ORAL ONCE
Refills: 0 | Status: DISCONTINUED | OUTPATIENT
Start: 2024-11-14 | End: 2024-11-14

## 2024-11-14 NOTE — ED ADULT NURSE NOTE - NSFALLUNIVINTERV_ED_ALL_ED
Bed/Stretcher in lowest position, wheels locked, appropriate side rails in place/Call bell, personal items and telephone in reach/Instruct patient to call for assistance before getting out of bed/chair/stretcher/Non-slip footwear applied when patient is off stretcher/[place] to call system/Physically safe environment - no spills, clutter or unnecessary equipment/Purposeful proactive rounding/Room/bathroom lighting operational, light cord in reach

## 2024-11-14 NOTE — ED PROVIDER NOTE - PHYSICAL EXAMINATION
Vital Signs: I have reviewed the initial vital signs.  CONSTITUTIONAL: Pt in no acute distress  SKIN: Skin exam is warm and dry, no acute rash.  HEAD: Normocephalic; atraumatic.  EYES: PERRL, EOM intact; conjunctiva and sclera clear.  ENT: No nasal discharge; airway clear. Oropharynx normal.  NECK: Supple;   CARD: S1, S2 normal; no murmurs, gallops, or rubs. Regular rate and rhythm.  RESP: CTAB. No wheezes, rales or rhonchi.  MSK: Normal ROM. No clubbing, cyanosis or edema..  NEURO: Alert, oriented. Grossly unremarkable. No focal deficits.  PSYCH: Cooperative, appropriate.

## 2024-11-14 NOTE — ED PROVIDER NOTE - ATTENDING CONTRIBUTION TO CARE
74-year-old male to ED for medication refill of his Klonopin.  Patient states he ran out of Klonopin.  He was given 30 tablets 10 days ago states he used them all up and his psychiatrist is gone.  No injury or fall well-appearing nontoxic explained to patient that we cannot refill clonazepam as show short interval.  I did give him 1 tablet here for acute crisis.  Afebrile vital signs stable exam as noted clear lungs bilaterally conjunctiva pink HEENT normal, regular rate and rhythm abdomen soft nontender and neuro nonfocal. 74-year-old male to ED for medication refill of his Klonopin.  Patient states he ran out of Klonopin.  He was given 30 tablets 10 days ago states he used them all up and his psychiatrist is gone.  No injury or fall well-appearing nontoxic explained to patient that we cannot refill clonazepam as show short interval.  I did give him 1 tablet here for acute crisis.  Afebrile vital signs stable exam as noted clear lungs bilaterally conjunctiva pink HEENT normal, regular rate and rhythm abdomen soft nontender and neuro nonfocal..

## 2024-11-14 NOTE — ED PROVIDER NOTE - PATIENT PORTAL LINK FT
You can access the FollowMyHealth Patient Portal offered by [facility] by registering at the following website: [email or web address]. By joining [employer]’s FollowMyHealth portal, you will also be able to view your health information using other applications (apps) compatible with our system.

## 2024-11-14 NOTE — ED PROVIDER NOTE - OBJECTIVE STATEMENT
74-year-old male past medical history anxiety on Klonopin, CKD, BPD, presents requesting refill on Klonopin.  Patient states he got 30-tablets refill 10 days ago and he used all of them during this time, and does not have access to his psychiatrist currently because he is away.  Patient has no other somatic complaints.

## 2024-11-14 NOTE — ED PROVIDER NOTE - NSFOLLOWUPINSTRUCTIONS_ED_ALL_ED_FT
Discussed in emergency department, follow-up with your primary care doctor and psychiatrist within 1 week regarding medication refill and regimen.  Return to emergency department immediately if you develop worsening pain, difficulty breathing, falls or any other concerning symptoms.

## 2024-11-14 NOTE — ED PROVIDER NOTE - ATTENDING APP SHARED VISIT CONTRIBUTION OF CARE
74-year-old male to ED for medication refill of his Klonopin.  Patient states he ran out of Klonopin.  He was given 30 tablets 10 days ago states he used them all up and his psychiatrist is gone.  No injury or fall well-appearing nontoxic explained to patient that we cannot refill clonazepam as show short interval.  I did give him 1 tablet here for acute crisis.  Afebrile vital signs stable exam as noted clear lungs bilaterally conjunctiva pink HEENT normal, regular rate and rhythm abdomen soft nontender and neuro nonfocal..

## 2024-11-16 ENCOUNTER — EMERGENCY (EMERGENCY)
Facility: HOSPITAL | Age: 74
LOS: 0 days | Discharge: HOME | End: 2024-11-16
Attending: STUDENT IN AN ORGANIZED HEALTH CARE EDUCATION/TRAINING PROGRAM
Payer: MEDICARE

## 2024-11-16 VITALS
WEIGHT: 145.06 LBS | HEART RATE: 89 BPM | SYSTOLIC BLOOD PRESSURE: 147 MMHG | OXYGEN SATURATION: 99 % | TEMPERATURE: 98 F | DIASTOLIC BLOOD PRESSURE: 87 MMHG | RESPIRATION RATE: 18 BRPM

## 2024-11-16 VITALS
RESPIRATION RATE: 18 BRPM | HEART RATE: 67 BPM | TEMPERATURE: 98 F | SYSTOLIC BLOOD PRESSURE: 149 MMHG | OXYGEN SATURATION: 98 % | DIASTOLIC BLOOD PRESSURE: 85 MMHG

## 2024-11-16 DIAGNOSIS — Z98.890 OTHER SPECIFIED POSTPROCEDURAL STATES: Chronic | ICD-10-CM

## 2024-11-16 DIAGNOSIS — Z95.5 PRESENCE OF CORONARY ANGIOPLASTY IMPLANT AND GRAFT: Chronic | ICD-10-CM

## 2024-11-16 DIAGNOSIS — Z79.82 LONG TERM (CURRENT) USE OF ASPIRIN: ICD-10-CM

## 2024-11-16 DIAGNOSIS — N18.30 CHRONIC KIDNEY DISEASE, STAGE 3 UNSPECIFIED: ICD-10-CM

## 2024-11-16 DIAGNOSIS — I25.2 OLD MYOCARDIAL INFARCTION: ICD-10-CM

## 2024-11-16 DIAGNOSIS — Z79.02 LONG TERM (CURRENT) USE OF ANTITHROMBOTICS/ANTIPLATELETS: ICD-10-CM

## 2024-11-16 DIAGNOSIS — K08.89 OTHER SPECIFIED DISORDERS OF TEETH AND SUPPORTING STRUCTURES: ICD-10-CM

## 2024-11-16 DIAGNOSIS — F31.9 BIPOLAR DISORDER, UNSPECIFIED: ICD-10-CM

## 2024-11-16 PROCEDURE — 99283 EMERGENCY DEPT VISIT LOW MDM: CPT

## 2024-11-16 PROCEDURE — 99284 EMERGENCY DEPT VISIT MOD MDM: CPT | Mod: GC

## 2024-11-16 RX ORDER — LIDOCAINE HYDROCHLORIDE 20 MG/ML
10 JELLY TOPICAL ONCE
Refills: 0 | Status: COMPLETED | OUTPATIENT
Start: 2024-11-16 | End: 2024-11-16

## 2024-11-16 RX ORDER — HYDROXYZINE HYDROCHLORIDE 25 MG/1
25 TABLET, FILM COATED ORAL ONCE
Refills: 0 | Status: COMPLETED | OUTPATIENT
Start: 2024-11-16 | End: 2024-11-16

## 2024-11-16 RX ORDER — HYDROXYZINE HYDROCHLORIDE 25 MG/1
1 TABLET, FILM COATED ORAL
Qty: 7 | Refills: 0
Start: 2024-11-16

## 2024-11-16 RX ORDER — AMOXICILLIN 500 MG/1
1 CAPSULE ORAL
Qty: 20 | Refills: 0
Start: 2024-11-16 | End: 2024-11-25

## 2024-11-16 RX ADMIN — LIDOCAINE HYDROCHLORIDE 10 MILLILITER(S): 20 JELLY TOPICAL at 07:37

## 2024-11-16 RX ADMIN — HYDROXYZINE HYDROCHLORIDE 25 MILLIGRAM(S): 25 TABLET, FILM COATED ORAL at 07:58

## 2024-12-12 ENCOUNTER — EMERGENCY (EMERGENCY)
Facility: HOSPITAL | Age: 74
LOS: 0 days | Discharge: HOME | End: 2024-12-12
Attending: STUDENT IN AN ORGANIZED HEALTH CARE EDUCATION/TRAINING PROGRAM
Payer: MEDICARE

## 2024-12-12 VITALS
OXYGEN SATURATION: 99 % | WEIGHT: 119.93 LBS | HEART RATE: 76 BPM | DIASTOLIC BLOOD PRESSURE: 100 MMHG | SYSTOLIC BLOOD PRESSURE: 160 MMHG | RESPIRATION RATE: 18 BRPM | TEMPERATURE: 98 F | HEIGHT: 68 IN

## 2024-12-12 DIAGNOSIS — F31.9 BIPOLAR DISORDER, UNSPECIFIED: ICD-10-CM

## 2024-12-12 DIAGNOSIS — R42 DIZZINESS AND GIDDINESS: ICD-10-CM

## 2024-12-12 DIAGNOSIS — Z98.890 OTHER SPECIFIED POSTPROCEDURAL STATES: Chronic | ICD-10-CM

## 2024-12-12 DIAGNOSIS — R11.0 NAUSEA: ICD-10-CM

## 2024-12-12 DIAGNOSIS — R25.1 TREMOR, UNSPECIFIED: ICD-10-CM

## 2024-12-12 DIAGNOSIS — Z95.5 PRESENCE OF CORONARY ANGIOPLASTY IMPLANT AND GRAFT: Chronic | ICD-10-CM

## 2024-12-12 LAB
ALBUMIN SERPL ELPH-MCNC: 4.2 G/DL — SIGNIFICANT CHANGE UP (ref 3.5–5.2)
ALP SERPL-CCNC: 106 U/L — SIGNIFICANT CHANGE UP (ref 30–115)
ALT FLD-CCNC: 10 U/L — SIGNIFICANT CHANGE UP (ref 0–41)
ANION GAP SERPL CALC-SCNC: 7 MMOL/L — SIGNIFICANT CHANGE UP (ref 7–14)
APTT BLD: 34.1 SEC — SIGNIFICANT CHANGE UP (ref 27–39.2)
AST SERPL-CCNC: 25 U/L — SIGNIFICANT CHANGE UP (ref 0–41)
BASOPHILS # BLD AUTO: 0.07 K/UL — SIGNIFICANT CHANGE UP (ref 0–0.2)
BASOPHILS NFR BLD AUTO: 0.7 % — SIGNIFICANT CHANGE UP (ref 0–1)
BILIRUB SERPL-MCNC: 0.3 MG/DL — SIGNIFICANT CHANGE UP (ref 0.2–1.2)
BUN SERPL-MCNC: 15 MG/DL — SIGNIFICANT CHANGE UP (ref 10–20)
CALCIUM SERPL-MCNC: 10.4 MG/DL — SIGNIFICANT CHANGE UP (ref 8.4–10.5)
CHLORIDE SERPL-SCNC: 109 MMOL/L — SIGNIFICANT CHANGE UP (ref 98–110)
CO2 SERPL-SCNC: 23 MMOL/L — SIGNIFICANT CHANGE UP (ref 17–32)
CREAT SERPL-MCNC: 1.1 MG/DL — SIGNIFICANT CHANGE UP (ref 0.7–1.5)
EGFR: 70 ML/MIN/1.73M2 — SIGNIFICANT CHANGE UP
EOSINOPHIL # BLD AUTO: 0.51 K/UL — SIGNIFICANT CHANGE UP (ref 0–0.7)
EOSINOPHIL NFR BLD AUTO: 5.3 % — SIGNIFICANT CHANGE UP (ref 0–8)
GLUCOSE SERPL-MCNC: 98 MG/DL — SIGNIFICANT CHANGE UP (ref 70–99)
HCT VFR BLD CALC: 46.9 % — SIGNIFICANT CHANGE UP (ref 42–52)
HGB BLD-MCNC: 16.4 G/DL — SIGNIFICANT CHANGE UP (ref 14–18)
IMM GRANULOCYTES NFR BLD AUTO: 0.3 % — SIGNIFICANT CHANGE UP (ref 0.1–0.3)
INR BLD: 0.88 RATIO — SIGNIFICANT CHANGE UP (ref 0.65–1.3)
LITHIUM SERPL-MCNC: 1.1 MMOL/L — SIGNIFICANT CHANGE UP (ref 0.6–1.2)
LYMPHOCYTES # BLD AUTO: 3.29 K/UL — SIGNIFICANT CHANGE UP (ref 1.2–3.4)
LYMPHOCYTES # BLD AUTO: 34.1 % — SIGNIFICANT CHANGE UP (ref 20.5–51.1)
MAGNESIUM SERPL-MCNC: 2.4 MG/DL — SIGNIFICANT CHANGE UP (ref 1.8–2.4)
MCHC RBC-ENTMCNC: 34.3 PG — HIGH (ref 27–31)
MCHC RBC-ENTMCNC: 35 G/DL — SIGNIFICANT CHANGE UP (ref 32–37)
MCV RBC AUTO: 98.1 FL — HIGH (ref 80–94)
MONOCYTES # BLD AUTO: 0.46 K/UL — SIGNIFICANT CHANGE UP (ref 0.1–0.6)
MONOCYTES NFR BLD AUTO: 4.8 % — SIGNIFICANT CHANGE UP (ref 1.7–9.3)
NEUTROPHILS # BLD AUTO: 5.3 K/UL — SIGNIFICANT CHANGE UP (ref 1.4–6.5)
NEUTROPHILS NFR BLD AUTO: 54.8 % — SIGNIFICANT CHANGE UP (ref 42.2–75.2)
NRBC # BLD: 0 /100 WBCS — SIGNIFICANT CHANGE UP (ref 0–0)
PLATELET # BLD AUTO: 222 K/UL — SIGNIFICANT CHANGE UP (ref 130–400)
PMV BLD: 9.8 FL — SIGNIFICANT CHANGE UP (ref 7.4–10.4)
POTASSIUM SERPL-MCNC: 5.2 MMOL/L — HIGH (ref 3.5–5)
POTASSIUM SERPL-SCNC: 5.2 MMOL/L — HIGH (ref 3.5–5)
PROT SERPL-MCNC: 6.9 G/DL — SIGNIFICANT CHANGE UP (ref 6–8)
PROTHROM AB SERPL-ACNC: 10.3 SEC — SIGNIFICANT CHANGE UP (ref 9.95–12.87)
RBC # BLD: 4.78 M/UL — SIGNIFICANT CHANGE UP (ref 4.7–6.1)
RBC # FLD: 13.5 % — SIGNIFICANT CHANGE UP (ref 11.5–14.5)
SODIUM SERPL-SCNC: 139 MMOL/L — SIGNIFICANT CHANGE UP (ref 135–146)
WBC # BLD: 9.66 K/UL — SIGNIFICANT CHANGE UP (ref 4.8–10.8)
WBC # FLD AUTO: 9.66 K/UL — SIGNIFICANT CHANGE UP (ref 4.8–10.8)

## 2024-12-12 PROCEDURE — 70450 CT HEAD/BRAIN W/O DYE: CPT | Mod: 26,MC,XU

## 2024-12-12 PROCEDURE — 70498 CT ANGIOGRAPHY NECK: CPT | Mod: 26,MC

## 2024-12-12 PROCEDURE — 99285 EMERGENCY DEPT VISIT HI MDM: CPT | Mod: FS

## 2024-12-12 PROCEDURE — 93005 ELECTROCARDIOGRAM TRACING: CPT

## 2024-12-12 PROCEDURE — 80053 COMPREHEN METABOLIC PANEL: CPT

## 2024-12-12 PROCEDURE — 99285 EMERGENCY DEPT VISIT HI MDM: CPT | Mod: 25

## 2024-12-12 PROCEDURE — 80178 ASSAY OF LITHIUM: CPT

## 2024-12-12 PROCEDURE — 85610 PROTHROMBIN TIME: CPT

## 2024-12-12 PROCEDURE — 70498 CT ANGIOGRAPHY NECK: CPT | Mod: MC

## 2024-12-12 PROCEDURE — 85730 THROMBOPLASTIN TIME PARTIAL: CPT

## 2024-12-12 PROCEDURE — 93010 ELECTROCARDIOGRAM REPORT: CPT

## 2024-12-12 PROCEDURE — 70496 CT ANGIOGRAPHY HEAD: CPT | Mod: MC

## 2024-12-12 PROCEDURE — 85025 COMPLETE CBC W/AUTO DIFF WBC: CPT

## 2024-12-12 PROCEDURE — 83735 ASSAY OF MAGNESIUM: CPT

## 2024-12-12 PROCEDURE — 36415 COLL VENOUS BLD VENIPUNCTURE: CPT

## 2024-12-12 PROCEDURE — 70496 CT ANGIOGRAPHY HEAD: CPT | Mod: 26,MC

## 2024-12-12 PROCEDURE — 36000 PLACE NEEDLE IN VEIN: CPT | Mod: XU

## 2024-12-12 PROCEDURE — 70450 CT HEAD/BRAIN W/O DYE: CPT | Mod: MC,XU

## 2024-12-12 NOTE — ED PROVIDER NOTE - PHYSICAL EXAMINATION
Vital Signs: I have reviewed the initial vital signs.  Constitutional: well-nourished, no acute distress, normocephalic  Eyes: PERRLA, EOMI, no nystagmus, clear conjunctiva  ENT: MMM, no facial droop, and tongue midline  Cardiovascular: regular rate, regular rhythm, no murmur appreciated  Respiratory: unlabored respiratory effort, clear to auscultation bilaterally  Gastrointestinal: soft, non-tender, non-distended  abdomen, no pulsatile mass  Musculoskeletal: supple neck, no lower extremity edema, no bony tenderness, +resting tremors  Integumentary: warm, dry, no rash  Neurologic: awake, alert, cranial nerves II-XII grossly intact, extremities’ motor and sensory functions grossly intact, no focal deficits

## 2024-12-12 NOTE — ED PROVIDER NOTE - OBJECTIVE STATEMENT
75 y/o male with hx of bipolar disorder on lithium, complaint with home medications presents to the ED for evaluation of resting tremors and dizziness with multiple falls over past few days . patient denies any headaches or visual changes. no weakness to extremities. no chest pain or sob. no fevers. no slurred speech. patient with unsteady gait. patient c/o heaviness to vertex of scalp with nausea.

## 2024-12-12 NOTE — ED PROVIDER NOTE - PATIENT PORTAL LINK FT
You can access the FollowMyHealth Patient Portal offered by [facility] by registering at the following website: [email or web address]. By joining [employer]’s FollowMyHealth portal, you will also be able to view your health information using other applications (apps) compatible with our system.

## 2024-12-12 NOTE — ED PROVIDER NOTE - CLINICAL SUMMARY MEDICAL DECISION MAKING FREE TEXT BOX
75 y/o M with bipolar on lithium p/w dizziness and confusion over the past week. He attributes this to being lonely as he lives alone. States he often comes to the hospital as he feels better around people. Notes he's been depressed the last few days, but denies SI/Hi. Says "I can manage at home, I have a wonderful grandchild who visits me, grandchildren are just the best, aren't they?", he says with a smile. Tells me he feels better after talking to me and his dizziness has improved. Said because of his depressive sxs he took an extra seroquel last night, asked if he should do that--I told him no and to discuss any medication changes with his psychiastrist. Says he saw his psychiatrist last week and has a good relationship with him.     On exam, vitals reviewed. Agree with PA exam above.     CT imaging and labs all unremarkable. Pt was observed in the ED for approximately 6 hours without and further intervention required. It is possible he had a mild case of serotonin syndrome related to his increased seroquel usage, but again, no interventions required and pt stable and feeling better.     labs and imaging reviewed with pt. given good instructions when to return to ED and importance of f/u.  all incidental findings were discussed with pt as well. pt verbalized understanding. patient was given opportunity to ask questions.

## 2024-12-12 NOTE — ED PROVIDER NOTE - NSICDXPASTSURGICALHX_GEN_ALL_CORE_FT
PAST SURGICAL HISTORY:  H/O hernia repair     S/P right coronary artery (RCA) stent placement      no

## 2024-12-12 NOTE — ED ADULT TRIAGE NOTE - CHIEF COMPLAINT QUOTE
BIBA complaining of dizziness; denies headache patient states he usually shakes but today he feels dizziness and shaking

## 2025-03-06 ENCOUNTER — EMERGENCY (EMERGENCY)
Facility: HOSPITAL | Age: 75
LOS: 0 days | Discharge: HOME | End: 2025-03-06
Attending: EMERGENCY MEDICINE
Payer: MEDICARE

## 2025-03-06 VITALS
HEART RATE: 83 BPM | SYSTOLIC BLOOD PRESSURE: 127 MMHG | HEIGHT: 68 IN | DIASTOLIC BLOOD PRESSURE: 91 MMHG | RESPIRATION RATE: 18 BRPM | WEIGHT: 145.06 LBS | TEMPERATURE: 98 F | OXYGEN SATURATION: 98 %

## 2025-03-06 DIAGNOSIS — F41.0 PANIC DISORDER [EPISODIC PAROXYSMAL ANXIETY]: ICD-10-CM

## 2025-03-06 DIAGNOSIS — Z95.5 PRESENCE OF CORONARY ANGIOPLASTY IMPLANT AND GRAFT: Chronic | ICD-10-CM

## 2025-03-06 DIAGNOSIS — F31.9 BIPOLAR DISORDER, UNSPECIFIED: ICD-10-CM

## 2025-03-06 DIAGNOSIS — Z98.890 OTHER SPECIFIED POSTPROCEDURAL STATES: Chronic | ICD-10-CM

## 2025-03-06 PROCEDURE — 99283 EMERGENCY DEPT VISIT LOW MDM: CPT | Mod: 25

## 2025-03-06 PROCEDURE — 93010 ELECTROCARDIOGRAM REPORT: CPT

## 2025-03-06 PROCEDURE — 99284 EMERGENCY DEPT VISIT MOD MDM: CPT | Mod: FS

## 2025-03-06 PROCEDURE — 93005 ELECTROCARDIOGRAM TRACING: CPT

## 2025-03-06 RX ORDER — CLONAZEPAM 0.5 MG/1
1 TABLET ORAL ONCE
Refills: 0 | Status: DISCONTINUED | OUTPATIENT
Start: 2025-03-06 | End: 2025-03-06

## 2025-03-06 RX ADMIN — CLONAZEPAM 1 MILLIGRAM(S): 0.5 TABLET ORAL at 04:03

## 2025-03-06 NOTE — ED ADULT NURSE NOTE - PAIN RATING/NUMBER SCALE (0-10): ACTIVITY
[facility]   Physical Therapy Progress Note      Name: Tiarra Norton  [facility] Number: [id number]    Therapy Diagnosis:   Encounter Diagnoses   Name Primary?    Decreased range of motion (ROM) of right knee Yes    Weakness of right lower extremity     Decreased range of motion of trunk and back     Decreased strength of trunk and back     Weakness of trunk musculature        Physician: Virgil Scott MD    Visit Date: 9/12/2024    Physician Orders: PT Eval and Treat   Medical Diagnosis from Referral:   Z96.651 (ICD-10-CM) - Status post total right knee replacement   M22.2X1 (ICD-10-CM) - Patellofemoral pain syndrome of right knee   M70.50 (ICD-10-CM) - Pes anserine bursitis      Evaluation Date: 12/21/2023  Authorization Period Expiration: 12/31/2024  Plan of Care Expiration: Updated to 11/12/2024  Visit # / Visits authorized: 35/40   Progress Note Due: 10/12/2024  FOTO: 3/4      Precautions: hx of TKA and menisectomy, spinal cord stimulator, scoliosis     Time In: 9:30 am  Time Out: 10:30 am   Total Billable Time: 55 minutes (1:1)  Total Appointment Time (timed & untimed codes): 60 minutes    PTA Visit #: 0/5   Subjective   Patient reports: she has been unable to come for a few weeks due to medication adverse effects, but that has been resolved. Her back and knee pain has worsened during her time off    She was compliant with home exercise program.  Response to previous treatment: no adverse effects   Functional change: improved tolerance to functional mobility activities with decreased pain    Pain: 5/10 R knee, 6/10 LB  Location: R low back and R knee   Objective      Updated 9/12/2024    Lumbar Range of Motion:     %   Flexion 90      Extension 70      Left Side Bending 60   Right Side Bending 60   Left rotation    70   Right Rotation    80    *= pain        Range of Motion:   Knee Left active Left Passive   Flexion 133 140   Extension 3 5      Knee Right active Right Passive  "  Flexion 121 124   Extension 1 3         Lower Extremity Strength     Right LE   Left LE     Hip flexion: 4/5 Hip flexion: 4+/5   Knee extension: 4/5 Knee extension: 4+/5   Knee flexion: 4/5 Knee flexion: 4+/5   Hip IR: 4/5 Hip IR: 4/5   Hip ER: 4/5 Hip ER: 4/5   Hip extension:  4-/5 Hip extension: 4-/5   Hip abduction: 4-/5 Hip abduction: 4/5   Hip adduction: 4-/5 Hip adduction 4/5   Ankle dorsiflexion: 5/5 Ankle dorsiflexion: 5/5   Ankle plantarflexion: 5/5 Ankle plantarflexion: 5/5          Treatment   Tiarra received the treatments listed below:      Therapeutic exercises to develop strength, endurance, ROM, flexibility, posture, and core stabilization for 15 minutes including:    Recumbent bike lv. 4 x 5 min for endurance and knee ROM  PPT 15x5"  Bridge 2x10x3"  EIL 2x10  DKTC 15x5"'  Open books x15,3" ea  Hip flexor stretch 2x1' ea  Leg press # 2x10  Leg press SL 80# 2x10    NP:  EIS 2x10    manual therapy techniques:  applied to the: B LB  for 10 minutes, including:    FDN. Educated pt to use heat following treatment sessions if pt is experiencing pain or soreness. Pt verbalized good understanding of education. Pt signed written consent to dry needling. Pt gave verbal consent for DN     Pt received dry needling to the below listed muscles using 50 mm needles.     B Lumbar paraspinals L1-L5      Neuromuscular re-education activities to improve: Balance, Coordination, Kinesthetic, Proprioception, and Posture for 20 minutes. The following activities were included:    Sit to stands GTB around knees 2x10  Side stepping GTB 2x12 steps  Monster walks GTB 2x12 steps  Single leg balance 3x30" R only  Step ups fwd/lateral  6 in step 2x10x3" ea R only   Medx trunk extension 70# x20  Medx trunk rotation 34# x20 increase weight   Heel raises 2x15 with 10# KB alternating hands      therapeutic activities to improve functional performance for 10  minutes, including:    Objective measures and reassessment      cold " pack for 5 minutes to R knee and low back    Patient Education and Home Exercises     Education provided:   - HEP, POC    Written Home Exercises Provided: Patient instructed to cont prior HEP. Exercises were reviewed and Tiarra was able to demonstrate them prior to the end of the session.  Tiarra demonstrated good  understanding of the education provided. See Electronic Medical Record under Patient Instructions for exercises provided during therapy sessions    Assessment   Tiarra presents today with reports of increased pain since last visit. Reassessment performed with continue to demo deficits in R knee ROM, B LE strength, and lumbar ROM. These deficits are causing patient to have decreased tolerance to gait and functional mobility activities. Will continue to benefit from skilled therapy to address described deficits, reduce pain levels, and return to prior level of function.     Tiarra Is progressing well towards her goals.   Patient prognosis is Good.     Patient will continue to benefit from skilled outpatient physical therapy to address the deficits listed in the problem list box on initial evaluation, provide pt/family education and to maximize pt's level of independence in the home and community environment.     Patient's spiritual, cultural and educational needs considered and pt agreeable to plan of care and goals.     Anticipated barriers to physical therapy: chronicity of condition, multiple treatment areas, hx of R TKA and menisectomy     GOALS: Short Term Goals:  4 weeks  1. Report decreased in pain at worse less than  <   / =  4  /10  to increase tolerance for functional activities.On going  2. Pt to increase B popliteal angle by greater than > or = 5 degrees in order to improve flexibility and posture. MET  3. Increased R LE MMT 1/2  to increase tolerance for ADL and work activities. MET  4. Pt to increase B Ely's Test by greater than  > or = 5degrees in order to improve flexibility and posture. MET  5.  Pt to tolerate HEP to improve ROM and independence with ADL's. MET  6. Pt to improve range of motion by 25% to allow for improved functional mobility to allow for improvement in IADLs. MET     Long Term Goals: 8 weeks  1. Report decreased in pain at worse less than  <   / =  2  /10  to increase tolerance for functional mobility.  On going  2 .Pt to increase B popliteal angle by greater than > or = 10 degrees in order to improve flexibility and posture. On going  3. Increased R LE MMT 1 grade to increase tolerance for ADL and work activities.On going  4. Pt will report at 51% or better score on FOTO Lumbar spine survey  to demonstrate decrease in disability and improvement in back pain.On going  5. Pt to be Independent with HEP to improve ROM and independence with ADL's. On going  6. Pt to increase B Ely's Test by greater than > or = 10 degrees in order to improve flexibility and posture. On going  7. Pt to demonstrate negative Bridge Test in order to show improved core strength for lumbar stabilization. On going  8. Pt to improve range of motion by 75% to allow for improved functional mobility to allow for improvement in IADLs. On going        Plan   Plan of care Certification: 5/9/2024 to 11/12/2024     Outpatient Physical Therapy 2 times weekly for 10 weeks to include the following interventions: Cervical/Lumbar Traction, Gait Training, Manual Therapy, Moist Heat/ Ice, Neuromuscular Re-ed, Patient Education, Self Care, Therapeutic Activities, and Therapeutic Exercise. Marybeth Anaya, PT   9/16/2024                                           0 (no pain/absence of nonverbal indicators of pain)

## 2025-03-06 NOTE — ED PROVIDER NOTE - NSFOLLOWUPINSTRUCTIONS_ED_ALL_ED_FT
Follow up with your primary care doctor in 1-2 days     Anxiety    WHAT YOU NEED TO KNOW:    Anxiety is a condition that causes you to feel extremely worried or nervous. The feelings are so strong that they can cause problems with your daily activities or sleep. Anxiety may be triggered by something you fear, or it may happen without a cause. Family or work stress, smoking, caffeine, and alcohol can increase your risk for anxiety. Certain medicines or health conditions can also increase your risk. Anxiety can become a long-term condition if it is not managed or treated.     DISCHARGE INSTRUCTIONS:    Call 911 if:     You have chest pain, tightness, or heaviness that may spread to your shoulders, arms, jaw, neck, or back.      You feel like hurting yourself or someone else.     Contact your healthcare provider if:     Your symptoms get worse or do not get better with treatment.      Your anxiety keeps you from doing your regular daily activities.      You have new symptoms since your last visit.      You have questions or concerns about your condition or care.    Medicines:     Medicines may be given to help you feel more calm and relaxed, and decrease your symptoms.      Take your medicine as directed. Contact your healthcare provider if you think your medicine is not helping or if you have side effects. Tell him of her if you are allergic to any medicine. Keep a list of the medicines, vitamins, and herbs you take. Include the amounts, and when and why you take them. Bring the list or the pill bottles to follow-up visits. Carry your medicine list with you in case of an emergency.    Follow up with your healthcare provider within 2 weeks or as directed: Write down your questions so you remember to ask them during your visits.    Manage anxiety:     Talk to someone about your anxiety. Your healthcare provider may suggest counseling. Cognitive behavioral therapy can help you understand and change how you react to events that trigger your symptoms. You might feel more comfortable talking with a friend or family member about your anxiety. Choose someone you know will be supportive and encouraging.      Find ways to relax. Activities such as exercise, meditation, or listening to music can help you relax. Spend time with friends, or do things you enjoy.      Practice deep breathing. Deep breathing can help you relax when you feel anxious. Focus on taking slow, deep breaths several times a day, or during an anxiety attack. Breathe in through your nose and out through your mouth.       Create a regular sleep routine. Regular sleep can help you feel calmer during the day. Go to sleep and wake up at the same times every day. Do not watch television or use the computer right before bed. Your room should be comfortable, dark, and quiet.       Eat a variety of healthy foods. Healthy foods include fruits, vegetables, low-fat dairy products, lean meats, fish, whole-grain breads, and cooked beans. Healthy foods can help you feel less anxious and have more energy.      Exercise regularly. Exercise can increase your energy level. Exercise may also lift your mood and help you sleep better. Your healthcare provider can help you create an exercise plan.      Do not smoke. Nicotine and other chemicals in cigarettes and cigars can increase anxiety. Ask your healthcare provider for information if you currently smoke and need help to quit. E-cigarettes or smokeless tobacco still contain nicotine. Talk to your healthcare provider before you use these products.       Do not have caffeine. Caffeine can make your symptoms worse. Do not have foods or drinks that are meant to increase your energy level.      Limit or do not drink alcohol. Ask your healthcare provider if alcohol is safe for you. You may not be able to drink alcohol if you take certain anxiety or depression medicines. Limit alcohol to 1 drink per day if you are a woman. Limit alcohol to 2 drinks per day if you are a man. A drink of alcohol is 12 ounces of beer, 5 ounces of wine, or 1½ ounces of liquor.       Do not use drugs. Drugs can make your anxiety worse. It can also make anxiety hard to manage. Talk to your healthcare provider if you use drugs and want help to quit.          © Copyright [employer] 2019 All illustrations and images included in CareNotes are the copyrighted property of [employer] or [employer].

## 2025-03-06 NOTE — ED PROVIDER NOTE - CLINICAL SUMMARY MEDICAL DECISION MAKING FREE TEXT BOX
74-year-old male with medical history of bipolar disorder here for assessment of self-reported panic attack.  Patient states he was home and began feeling anxious and shaky.  He called EMS and on arrival to the ER is feeling much better.  Patient is concerned that the symptoms are present and are intact despite the fact that he had no chest pain, shortness of breath, dizziness, nausea.    Patient had normal vital signs and nonischemic, normal EKG.  He received a dose of Klonopin in the ED and was observed.  He had no recurrence of his symptoms.    Symptoms are not suggestive of anginal equivalent.  There is no indication for labs or further cardiac evaluation.    Patient resting comfortably in the ED.  Will DC home with close monitoring symptoms, return precautions and follow-up.

## 2025-03-06 NOTE — ED PROVIDER NOTE - PATIENT PORTAL LINK FT
You can access the FollowMyHealth Patient Portal offered by [facility] by registering at the following website: [email or web address]. By joining [email or web address]’s FollowMyHealth portal, you will also be able to view your health information using other applications (apps) compatible with our system.

## 2025-03-06 NOTE — ED PROVIDER NOTE - OBJECTIVE STATEMENT
74 year old male past medical history of bipolar disorder comes to emergency room for panic attack. patient states he was at home and shaking and not feeling well. patient took klonopin and didn't help so he called 911. denies chest pain and shortness of breath. no fever/chills. no back pain no nausea, vomiting, diarrhea.

## 2025-03-18 ENCOUNTER — EMERGENCY (EMERGENCY)
Facility: HOSPITAL | Age: 75
LOS: 0 days | Discharge: HOME | End: 2025-03-18
Attending: EMERGENCY MEDICINE
Payer: MEDICARE

## 2025-03-18 VITALS
SYSTOLIC BLOOD PRESSURE: 190 MMHG | HEIGHT: 68 IN | RESPIRATION RATE: 18 BRPM | HEART RATE: 65 BPM | WEIGHT: 145.06 LBS | TEMPERATURE: 97 F | OXYGEN SATURATION: 97 % | DIASTOLIC BLOOD PRESSURE: 113 MMHG

## 2025-03-18 DIAGNOSIS — Z98.890 OTHER SPECIFIED POSTPROCEDURAL STATES: Chronic | ICD-10-CM

## 2025-03-18 DIAGNOSIS — K02.9 DENTAL CARIES, UNSPECIFIED: ICD-10-CM

## 2025-03-18 DIAGNOSIS — Z95.5 PRESENCE OF CORONARY ANGIOPLASTY IMPLANT AND GRAFT: Chronic | ICD-10-CM

## 2025-03-18 PROCEDURE — 99283 EMERGENCY DEPT VISIT LOW MDM: CPT | Mod: GC

## 2025-03-18 PROCEDURE — 99281 EMR DPT VST MAYX REQ PHY/QHP: CPT

## 2025-03-18 RX ORDER — AMOXICILLIN 500 MG/1
1 CAPSULE ORAL
Qty: 20 | Refills: 0
Start: 2025-03-18 | End: 2025-03-27

## 2025-03-18 NOTE — ED PROVIDER NOTE - PHYSICAL EXAMINATION
VITAL SIGNS: I have reviewed nursing notes and confirm.  CONSTITUTIONAL: Well-appearing, non-toxic, in NAD  SKIN: Warm dry, normal skin turgor  EYES: No conjunctival injection, scleral anicteric  ENT: Avulsed tooth 21. No abscess or erythema noted.

## 2025-03-18 NOTE — ED PROVIDER NOTE - NSFOLLOWUPINSTRUCTIONS_ED_ALL_ED_FT
Medicine Refill    WHAT YOU NEED TO KNOW:    You may have been given a prescription in the emergency department for a few days of your medicine. It is important to refill your medicine before you completely run out. You need to follow up with your healthcare provider for a full prescription within the next few days. You will not be given additional refills in the emergency department.    DISCHARGE INSTRUCTIONS:    Follow up with your healthcare provider: Contact your healthcare provider before you are completely out of medicine. Write down your questions so you remember to ask them during your visits.    Refill tips: Your medicine will treat your condition if you take the medicine regularly. Prevent missed doses by doing the following:    Keep a chart of your medicine. Include all of your current medicines. Write down the name and strength of each medicine, the prescription number, and the number of refills. Also write down the dates of your refills. Ask your pharmacy or insurance provider for other ways to help you keep track of your medicines.    Refill medicines a few days before you run out. This will decrease any problems that will prevent you from getting your medicines on time. Problems include a closed pharmacy, or the pharmacy may have to contact your healthcare provider.    If you know you are going to be traveling, refill your medicines before you leave. You may not be able to get refills if you do not use your local pharmacy. You may need to call your insurance provider to make them aware of your travels.

## 2025-03-18 NOTE — ED PROVIDER NOTE - PATIENT PORTAL LINK FT
dr eli, jewel ventura You can access the FollowMyHealth Patient Portal offered by [facility] by registering at the following website: [email or web address]. By joining [employer]’s FollowMyHealth portal, you will also be able to view your health information using other applications (apps) compatible with our system.

## 2025-03-18 NOTE — ED PROVIDER NOTE - BIRTH SEX
Injectable Influenza Immunization Documentation    1.  Is the person to be vaccinated sick today?   No    2. Does the person to be vaccinated have an allergy to a component   of the vaccine?   No  Egg Allergy Algorithm Link    3. Has the person to be vaccinated ever had a serious reaction   to influenza vaccine in the past?   No    4. Has the person to be vaccinated ever had Guillain-Barré syndrome?   No    Form completed by Lucia Andrea CMA ([facility])......................10/23/2018  9:50 AM           Male

## 2025-03-18 NOTE — ED PROVIDER NOTE - OBJECTIVE STATEMENT
74-year-old male past medical history of bipolar disorder on lithium presents to the ED for medication refill.  He states that he is taking amoxicillin for a tooth infection and he lost the bottle on Thursday but still had a few pills left in it.  He states that he received the prescription here a couple weeks ago. Denies fever, chills, trismus

## 2025-03-18 NOTE — ED PROVIDER NOTE - CLINICAL SUMMARY MEDICAL DECISION MAKING FREE TEXT BOX
74-year-old male no significant past medical history with right upper dental pain.  74-year-old male past medical history significant for bipolar disorder requesting refill of amoxicillin for tooth infection.  Patient reports he lost the bottle of medication.  Patient denies any fever, chills.  No difficulty breathing or swallowing.  Patient tolerating p.o.  No headache or neck pain.  No nausea, vomiting.  Patient with tooth #21 caries.  Positive tenderness to palpation.  Gingiva normal.  No gingival abscess.  Oropharynx normal.  Patient prescribed amoxicillin and referred to the dental clinic for further treatment.

## 2025-03-19 ENCOUNTER — EMERGENCY (EMERGENCY)
Facility: HOSPITAL | Age: 75
LOS: 0 days | Discharge: HOME | End: 2025-03-19
Attending: EMERGENCY MEDICINE
Payer: MEDICARE

## 2025-03-19 VITALS
SYSTOLIC BLOOD PRESSURE: 176 MMHG | WEIGHT: 145.06 LBS | RESPIRATION RATE: 18 BRPM | HEART RATE: 85 BPM | TEMPERATURE: 98 F | HEIGHT: 68 IN | DIASTOLIC BLOOD PRESSURE: 114 MMHG

## 2025-03-19 DIAGNOSIS — K08.89 OTHER SPECIFIED DISORDERS OF TEETH AND SUPPORTING STRUCTURES: ICD-10-CM

## 2025-03-19 DIAGNOSIS — Z98.890 OTHER SPECIFIED POSTPROCEDURAL STATES: Chronic | ICD-10-CM

## 2025-03-19 PROCEDURE — 99283 EMERGENCY DEPT VISIT LOW MDM: CPT | Mod: FS

## 2025-03-19 PROCEDURE — 99282 EMERGENCY DEPT VISIT SF MDM: CPT

## 2025-03-19 NOTE — ED PROVIDER NOTE - NSFOLLOWUPCLINICS_GEN_ALL_ED_FT
[facility]  Dental  [address] [place] [id number]  Phone: [phone]  Fax:

## 2025-03-19 NOTE — ED PROVIDER NOTE - OBJECTIVE STATEMENT
74 year old male with pmhx noted presents to ed with right upper dental pain. pt was in ed yesterday and told to come back to dental clinic today. no swelling or fever.

## 2025-03-19 NOTE — ED PROVIDER NOTE - PHYSICAL EXAMINATION
CONST: Well appearing in NAD  EYES: PERRL, EOMI, Sclera and conjunctiva clear.  ENT: No nasal discharge. TM's clear B/L without drainage. Oropharynx normal appearing, no erythema or exudates. No abscess or swelling. Uvula midline.   SKIN: Warm, dry, no acute rashes. Good turgor

## 2025-03-19 NOTE — ED PROVIDER NOTE - PATIENT PORTAL LINK FT
You can access the FollowMyHealth Patient Portal offered by [facility] by registering at the following website: [email or web address]. By joining [employer]’s FollowMyHealth portal, you will also be able to view your health information using other applications (apps) compatible with our system.

## 2025-03-19 NOTE — ED PROVIDER NOTE - NSFOLLOWUPINSTRUCTIONS_ED_ALL_ED_FT
Dental Pain    Dental pain (toothache) may be caused by many things including tooth decay (cavities or caries), abscess or infection, or trauma. If you were prescribed an antibiotic medicine, finish all of it even if you start to feel better. Rinsing your mouth with salt water or applying ice to the painful area of your face may help with the pain. Follow up with a dentist is important in ensuring good oral health and preventing the worsening of dental disease.    SEEK IMMEDIATE MEDICAL CARE IF YOU HAVE ANY OF THE FOLLOWING SYMPTOMS: unable to open your mouth, trouble breathing or swallowing, fever, or swelling of the face, neck, or jaw.    Our Emergency Department Referral Coordinators will be reaching out to you in the next 24-48 hours from 9:00am to 5:00pm with a follow up appointment. Please expect a phone call from the hospital in that time frame. If you do not receive a call or if you have any questions or concerns, you can reach them at   [phone]

## 2025-04-01 NOTE — ED ADULT NURSE NOTE - GASTROINTESTINAL WDL
"      Patient ID: Endy Pruett is a 49 y.o. male who presents for Diabetes.  Pt is here for First appointment.     PCP/Referring Provider: Mile Hurst DO  Last Visit: 11.25.24  Next visit: 2.24.25    Verbal consent to manage patient's drug therapy was obtained from patient. They were informed they may decline to participate or withdraw from participation in pharmacy services at any time.     Patient Assistance for Mounjaro approved through 12.3.2025. Will have to be renewed prior to that date to prevent lapse in coverage. Medication(s) will be received at no cost to patient from [facility].      Subjective   Treatment Adherence:   Preferred pharmacy: [facility]  Can patient afford prescribed medications: No, Mounjaro $$$    Starting weight: 247lb  Last weight: 240-245lb (fluctuates greatly)  Current weight: 241      HPI  DIABETES MELLITUS TYPE 2:    Known diabetic complications: none.  Does patient follow with Endocrinology?: no     Last optometry exam: not assessed  Most recent visit in Podiatry: not assessed     Current diabetic medications include:  Mounjaro 7.5mg once weekly - Thur evening  Metformin 1G twice daily     Clarifications to above regimen: none  Adverse Effects: no diarrhea or constipation - states things have \"normalized\"    Home blood glucose records (mg/dL)  Max 130    Any episodes of hypoglycemia? No, denies .    Did patient treat episode of hypoglycemia appropriately? N/A  Does the patient have a prescription for ready-to-use Glucagon? Not on insulin     Does pt have proteinuria? N/A   If appropriate, is patient on ACEi/ARB? N/A    Secondary Prevention  Statin? Yes  ACE-I/ARB? No  Aspirin? N/A    Pertinent PMH Review:  PMH of Pancreatitis: No  PMH of Retinopathy: No  PMH of Urinary Tract Infections: No  PMH of MTC: No    Lifestyle:  Current exercise: is a , on his feet  Current diet: feels appetite is suppressed; must have smaller meals  Trying to prioritize " proteins  Granola bars in the AM (NV oats and honey, peanut butter), no protein shakes yet        Review of Systems    Objective     BP Readings from Last 4 Encounters:   03/10/25 140/86   11/25/24 128/60   10/24/24 138/64      There were no vitals filed for this visit.     Labs  Creatinine   Date Value Ref Range Status   11/12/2024 0.82 0.50 - 1.30 mg/dL Final     eGFR   Date Value Ref Range Status   11/12/2024 >90 >60 mL/min/1.73m*2 Final     Comment:     Calculations of estimated GFR are performed using the 2021 CKD-EPI Study Refit equation without the race variable for the IDMS-Traceable creatinine methods.  https://jasn.asnjournals.org/content/early/2021/09/22/ASN.8984024512     Sodium   Date Value Ref Range Status   11/12/2024 139 136 - 145 mmol/L Final     Potassium   Date Value Ref Range Status   11/12/2024 4.4 3.5 - 5.3 mmol/L Final     Chloride   Date Value Ref Range Status   11/12/2024 103 98 - 107 mmol/L Final     Urea Nitrogen   Date Value Ref Range Status   11/12/2024 9 6 - 23 mg/dL Final     AST   Date Value Ref Range Status   03/03/2025 24 10 - 40 U/L Final        Lab Results   Component Value Date    BILITOT 1.1 03/03/2025    CALCIUM 8.6 11/12/2024    CO2 32 11/12/2024     11/12/2024    CREATININE 0.82 11/12/2024    GLUCOSE 177 (H) 11/12/2024    ALKPHOS 42 03/03/2025    K 4.4 11/12/2024    PROT 6.8 03/03/2025     11/12/2024    AST 24 03/03/2025    ALT 25 03/03/2025    BUN 9 11/12/2024    ANIONGAP 8 (L) 11/12/2024    ALBUMIN 4.5 03/03/2025     Lab Results   Component Value Date    TRIG 87 11/12/2024    CHOL 117 11/12/2024    LDLCALC 50 11/12/2024    HDL 49.4 11/12/2024     Lab Results   Component Value Date    HGBA1C 6.6 (H) 03/03/2025       Current Outpatient Medications   Medication Instructions    blood sugar diagnostic strip Test glucose 1 to 3 times a day as needed (for low blood sugar symptoms)    cholecalciferol (VITAMIN D3) 50 mcg, oral, Daily    lancets 30 gauge misc Test glucose  1 to 3 times a day as needed (for low blood sugar symptoms)    metFORMIN (GLUCOPHAGE) 1,000 mg, oral, 2 times daily (morning and late afternoon), USE AS DIRECTED    Mounjaro 7.5 mg, subcutaneous, Weekly    multivitamin tablet 1 tablet, Daily    rosuvastatin (CRESTOR) 20 mg, oral, Daily    tadalafil (CIALIS) 10 mg, oral, Daily PRN        DRUG INTERACTIONS:  None at time of review    Assessment/Plan   Problem List Items Addressed This Visit             ICD-10-CM    Type 2 diabetes mellitus without complication, without long-term current use of insulin E11.9     Is pt at goal? Yes, 6.6%  Patient's SMBGs are at goal. Diarrhea and constipation have improved. No changes needed today - will allow body to adjust to 7.5mg dose. Can try to increase in future as tolerated for additional weight loss, but not needed from BG standpoint.    Medication Changes:  CONTINUE  Mounjaro to 7.5mg once weekly      PATIENT EDUCATION/GOALS  Average < 150mg/dL  Time in range > 70%  Fasting B - 130 mg/dL  Postprandial BG: less than 180 mg/dL  A1c: less than 7%    Low and High Blood Sugar  Symptoms of low blood sugar include: Fast heartbeat, shaking, sweating, nervousness or anxiety, irritability or confusion, and/or dizziness.  Symptoms of high blood sugar include: Feeling more thirsty than usual, urinating often, losing weight without trying, presence of ketones in the urine, feeling tired and weak, feeling irritable or having other mood changes, having blurry vision, and/or having slow-healing sores.  If you experience symptoms of low blood sugar (blood sugar less than 70 mg/dL) follow the rule of 15 by eating ~15 g of simple carbohydrates (examples: half cup juice, 3-4 glucose tabs, 1 tablespoon of sugar, honey, or syrup).    Dietary Recommendations  The a lower carbohydrate or Mediterranean diet is often recommended for patients with elevated blood glucose.   Food recommendations:   Focus on whole foods, with as few ingredients as  possible.   Focus on lower glycemic foods (GI of 55 or less): this includes most fruits and vegetables, beans, minimally processed grains, dairy, nuts and seeds.  Minimize moderate glycemic index (GI 56 to 69) foods: White and sweet potatoes, corn, white rice, couscous, breakfast cereals such as Cream of Wheat.  Avoid high glycemic index (GI of 70 or higher): White bread, rice cakes, most crackers, bagels, cakes, doughnuts, croissants, most packaged breakfast cereals.  Include 1-2 servings weekly fatty fish that are low in mercury such as salmon, mackerel, anchovies, sardines, and herring. Avoid frying fish. Bake, steam, or poach.   Avoid trans-fats (fried foods, microwave popcorn, margarine, etc.).   Use oils such as coconut oil, olive oil, avocado oil, or ghee. Select oils appropriate for the temperature you are cooking at.   Avoid processed meats (such as deli meat), canned soups, soy sauce, and fried foods these are all high in added sodium.   The recommended sodium intake for most people is around 2,300 mg/day (too little or too much salt can affect blood pressure).   It is ok to add salt to suit your taste to fresh whole foods (such as vegetables) that you are cooking at home.   Include 4-5 servings daily of both fruits and vegetables. Fruits and vegetables are a good source of fiber, potassium, and magnesium which help support healthy blood pressure.  Focus on eating a variety of colors each day (eating the rainbow- such as red peppers, orange carrots, yellow beans, green lettuce, blue/purple berries, white/brown onions).   Avoid foods with added sugars (goal of <10 grams/serving of added sugar). Limit added sugars to less than 24 (women)-36 (men) grams daily.  Beverage recommendations:    Avoid caffeinated drinks such as coffee, energy drinks, and soda (both regular and diet). Consider sparkling water, water with lemon (or other fruit), or black, oolong, or green tea (prior to noon).   Avoid regular  consumption of alcohol. If it is a special occasion the recommended alcohol intake for a male is 2 (men) or 1(women) or less drinks per day.  Alcohol consumption may place people with diabetes at increased risk for delayed hypoglycemia (low blood sugar) especially if taking other medications that may cause hypoglycemia such as insulin.     Lifestyle Recommendations  Avoid tobacco products (including chewing tobacco and vaping).   Continue to integrate regular movement and enjoyable forms of exercise into your weekly routine. The recommended exercise regimen is 150 minutes per week (for example 5 days per week, 30 minutes per day).   Consider walking for 10-15 minutes after each meal in order to help control blood sugar.   Manage/reduce stress  Consider therapy, mindfulness, breathing exercises (4-7-8 breath), meditation, yoga, journaling, addressing/removing stressors, etc.   Sleep  Goal of 7-9 hours of restful sleep nightly.      Mounjaro Education:  Counseled patient on Mounjaro MOA, expectations, side effects, duration of therapy, administration, and monitoring parameters.  Counseled patient on the benefits of GLP-1ra, such as cardiovascular risk reduction, glycemic control, and weight loss potential.  Provided detailed dosing and administration counseling to ensure proper technique.   Reviewed Mounjaro titration schedule, starting with 2.5 mg once weekly to 5 mg, 7.5mg, 10mg, 12.5mg and if tolerated 15 mg.  Reviewed storage requirements of Mounjaro when not in use, and when to administer the medication if a dose is missed.  Discussed risks of GLP1ra including risk of pancreatitis, MTC and worsening of DR  Advised patient that they may experience improved satiety after meals and portion sizes of meals may be reduced as doses of Mounjaro increase.         Relevant Orders    Referral to Clinical Pharmacy         Immunizations needed:  COVID  Labs ordered:  none    Referrals:  none     Follow-up: 6 weeks     Patient  was provided with PharmD phone number and encouraged to reach out with any questions or concerns Prior to next appointment or ask provider for another pharmacy referral.    Time spent with pt: Total length of time 10 (minutes) of the encounter and more than 50% was spent counseling the patient.    Thank you for allowing to take part in the care of this patient.    Diane Escalona PharmD, BARRIE  Clinical Pharmacist  [phone]  [email or web address]    Continue all meds under the continuation of care with the referring provider and clinical pharmacy team.    Verbal consent to manage patient's drug therapy was obtained from the patient. They were informed they may decline to participate or withdraw from participation in pharmacy services at any time.   Abdomen soft, nontender, nondistended, bowel sounds present in all 4 quadrants.

## 2025-05-07 ENCOUNTER — EMERGENCY (EMERGENCY)
Facility: HOSPITAL | Age: 75
LOS: 0 days | Discharge: HOME | End: 2025-05-07
Attending: EMERGENCY MEDICINE
Payer: MEDICARE

## 2025-05-07 VITALS
HEIGHT: 68 IN | HEART RATE: 71 BPM | OXYGEN SATURATION: 99 % | RESPIRATION RATE: 18 BRPM | TEMPERATURE: 98 F | SYSTOLIC BLOOD PRESSURE: 139 MMHG | DIASTOLIC BLOOD PRESSURE: 83 MMHG

## 2025-05-07 VITALS — RESPIRATION RATE: 18 BRPM | OXYGEN SATURATION: 97 %

## 2025-05-07 DIAGNOSIS — Z95.5 PRESENCE OF CORONARY ANGIOPLASTY IMPLANT AND GRAFT: Chronic | ICD-10-CM

## 2025-05-07 DIAGNOSIS — I25.2 OLD MYOCARDIAL INFARCTION: ICD-10-CM

## 2025-05-07 DIAGNOSIS — F41.0 PANIC DISORDER [EPISODIC PAROXYSMAL ANXIETY]: ICD-10-CM

## 2025-05-07 DIAGNOSIS — I25.10 ATHEROSCLEROTIC HEART DISEASE OF NATIVE CORONARY ARTERY WITHOUT ANGINA PECTORIS: ICD-10-CM

## 2025-05-07 DIAGNOSIS — F31.9 BIPOLAR DISORDER, UNSPECIFIED: ICD-10-CM

## 2025-05-07 DIAGNOSIS — Z98.890 OTHER SPECIFIED POSTPROCEDURAL STATES: Chronic | ICD-10-CM

## 2025-05-07 DIAGNOSIS — R06.02 SHORTNESS OF BREATH: ICD-10-CM

## 2025-05-07 LAB
ALBUMIN SERPL ELPH-MCNC: 4.2 G/DL — SIGNIFICANT CHANGE UP (ref 3.5–5.2)
ALP SERPL-CCNC: 120 U/L — HIGH (ref 30–115)
ALT FLD-CCNC: 10 U/L — SIGNIFICANT CHANGE UP (ref 0–41)
ANION GAP SERPL CALC-SCNC: 9 MMOL/L — SIGNIFICANT CHANGE UP (ref 7–14)
AST SERPL-CCNC: 18 U/L — SIGNIFICANT CHANGE UP (ref 0–41)
BASE EXCESS BLDV CALC-SCNC: -0.3 MMOL/L — SIGNIFICANT CHANGE UP (ref -2–3)
BASOPHILS # BLD AUTO: 0.06 K/UL — SIGNIFICANT CHANGE UP (ref 0–0.2)
BASOPHILS NFR BLD AUTO: 0.7 % — SIGNIFICANT CHANGE UP (ref 0–1)
BILIRUB SERPL-MCNC: <0.2 MG/DL — SIGNIFICANT CHANGE UP (ref 0.2–1.2)
BUN SERPL-MCNC: 16 MG/DL — SIGNIFICANT CHANGE UP (ref 10–20)
CA-I SERPL-SCNC: 1.43 MMOL/L — HIGH (ref 1.15–1.33)
CALCIUM SERPL-MCNC: 10.5 MG/DL — SIGNIFICANT CHANGE UP (ref 8.4–10.5)
CHLORIDE SERPL-SCNC: 108 MMOL/L — SIGNIFICANT CHANGE UP (ref 98–110)
CO2 SERPL-SCNC: 23 MMOL/L — SIGNIFICANT CHANGE UP (ref 17–32)
CREAT SERPL-MCNC: 1.3 MG/DL — SIGNIFICANT CHANGE UP (ref 0.7–1.5)
EGFR: 57 ML/MIN/1.73M2 — LOW
EGFR: 57 ML/MIN/1.73M2 — LOW
EOSINOPHIL # BLD AUTO: 0.32 K/UL — SIGNIFICANT CHANGE UP (ref 0–0.7)
EOSINOPHIL NFR BLD AUTO: 3.7 % — SIGNIFICANT CHANGE UP (ref 0–8)
GAS PNL BLDV: 136 MMOL/L — SIGNIFICANT CHANGE UP (ref 136–145)
GAS PNL BLDV: SIGNIFICANT CHANGE UP
GAS PNL BLDV: SIGNIFICANT CHANGE UP
GLUCOSE SERPL-MCNC: 70 MG/DL — SIGNIFICANT CHANGE UP (ref 70–99)
HCO3 BLDV-SCNC: 27 MMOL/L — SIGNIFICANT CHANGE UP (ref 22–29)
HCT VFR BLD CALC: 43.4 % — SIGNIFICANT CHANGE UP (ref 42–52)
HCT VFR BLDA CALC: 49 % — SIGNIFICANT CHANGE UP (ref 39–51)
HGB BLD CALC-MCNC: 16.3 G/DL — SIGNIFICANT CHANGE UP (ref 12.6–17.4)
HGB BLD-MCNC: 15.2 G/DL — SIGNIFICANT CHANGE UP (ref 14–18)
IMM GRANULOCYTES NFR BLD AUTO: 0.3 % — SIGNIFICANT CHANGE UP (ref 0.1–0.3)
LACTATE BLDV-MCNC: 1.3 MMOL/L — SIGNIFICANT CHANGE UP (ref 0.5–2)
LYMPHOCYTES # BLD AUTO: 2 K/UL — SIGNIFICANT CHANGE UP (ref 1.2–3.4)
LYMPHOCYTES # BLD AUTO: 23 % — SIGNIFICANT CHANGE UP (ref 20.5–51.1)
MCHC RBC-ENTMCNC: 34.7 PG — HIGH (ref 27–31)
MCHC RBC-ENTMCNC: 35 G/DL — SIGNIFICANT CHANGE UP (ref 32–37)
MCV RBC AUTO: 99.1 FL — HIGH (ref 80–94)
MONOCYTES # BLD AUTO: 0.43 K/UL — SIGNIFICANT CHANGE UP (ref 0.1–0.6)
MONOCYTES NFR BLD AUTO: 4.9 % — SIGNIFICANT CHANGE UP (ref 1.7–9.3)
NEUTROPHILS # BLD AUTO: 5.86 K/UL — SIGNIFICANT CHANGE UP (ref 1.4–6.5)
NEUTROPHILS NFR BLD AUTO: 67.4 % — SIGNIFICANT CHANGE UP (ref 42.2–75.2)
NRBC BLD AUTO-RTO: 0 /100 WBCS — SIGNIFICANT CHANGE UP (ref 0–0)
NT-PROBNP SERPL-SCNC: <36 PG/ML — SIGNIFICANT CHANGE UP (ref 0–300)
PCO2 BLDV: 54 MMHG — SIGNIFICANT CHANGE UP (ref 42–55)
PH BLDV: 7.31 — LOW (ref 7.32–7.43)
PLATELET # BLD AUTO: 179 K/UL — SIGNIFICANT CHANGE UP (ref 130–400)
PMV BLD: 9.8 FL — SIGNIFICANT CHANGE UP (ref 7.4–10.4)
PO2 BLDV: 23 MMHG — LOW (ref 25–45)
POTASSIUM BLDV-SCNC: 4.5 MMOL/L — SIGNIFICANT CHANGE UP (ref 3.5–5.1)
POTASSIUM SERPL-MCNC: 4.6 MMOL/L — SIGNIFICANT CHANGE UP (ref 3.5–5)
POTASSIUM SERPL-SCNC: 4.6 MMOL/L — SIGNIFICANT CHANGE UP (ref 3.5–5)
PROT SERPL-MCNC: 6.5 G/DL — SIGNIFICANT CHANGE UP (ref 6–8)
RBC # BLD: 4.38 M/UL — LOW (ref 4.7–6.1)
RBC # FLD: 13.5 % — SIGNIFICANT CHANGE UP (ref 11.5–14.5)
SAO2 % BLDV: 35.5 % — LOW (ref 67–88)
SODIUM SERPL-SCNC: 140 MMOL/L — SIGNIFICANT CHANGE UP (ref 135–146)
TROPONIN T, HIGH SENSITIVITY RESULT: <6 NG/L — SIGNIFICANT CHANGE UP (ref 6–21)
WBC # BLD: 8.7 K/UL — SIGNIFICANT CHANGE UP (ref 4.8–10.8)
WBC # FLD AUTO: 8.7 K/UL — SIGNIFICANT CHANGE UP (ref 4.8–10.8)

## 2025-05-07 PROCEDURE — 99285 EMERGENCY DEPT VISIT HI MDM: CPT | Mod: 25

## 2025-05-07 PROCEDURE — 71045 X-RAY EXAM CHEST 1 VIEW: CPT | Mod: 26

## 2025-05-07 PROCEDURE — 82803 BLOOD GASES ANY COMBINATION: CPT

## 2025-05-07 PROCEDURE — 85025 COMPLETE CBC W/AUTO DIFF WBC: CPT

## 2025-05-07 PROCEDURE — 99285 EMERGENCY DEPT VISIT HI MDM: CPT | Mod: FS

## 2025-05-07 PROCEDURE — 80053 COMPREHEN METABOLIC PANEL: CPT

## 2025-05-07 PROCEDURE — 82330 ASSAY OF CALCIUM: CPT

## 2025-05-07 PROCEDURE — 36415 COLL VENOUS BLD VENIPUNCTURE: CPT

## 2025-05-07 PROCEDURE — 84484 ASSAY OF TROPONIN QUANT: CPT

## 2025-05-07 PROCEDURE — 84132 ASSAY OF SERUM POTASSIUM: CPT

## 2025-05-07 PROCEDURE — 83605 ASSAY OF LACTIC ACID: CPT

## 2025-05-07 PROCEDURE — 93010 ELECTROCARDIOGRAM REPORT: CPT

## 2025-05-07 PROCEDURE — 36000 PLACE NEEDLE IN VEIN: CPT

## 2025-05-07 PROCEDURE — 83880 ASSAY OF NATRIURETIC PEPTIDE: CPT

## 2025-05-07 PROCEDURE — 85014 HEMATOCRIT: CPT

## 2025-05-07 PROCEDURE — 71045 X-RAY EXAM CHEST 1 VIEW: CPT

## 2025-05-07 PROCEDURE — 84295 ASSAY OF SERUM SODIUM: CPT

## 2025-05-07 PROCEDURE — 93005 ELECTROCARDIOGRAM TRACING: CPT

## 2025-05-07 PROCEDURE — 85018 HEMOGLOBIN: CPT

## 2025-05-07 NOTE — ED PROVIDER NOTE - INTERPRETATION
Pt presents to ED via EMS for a cough that started yesterday.  Pt comes from a nursing home, history of dementia.  Staff at nursing home also stated that pt has been complaining of left sided chest pain as well.  ABCs intact,    RBBB/normal sinus rhythm

## 2025-05-07 NOTE — ED PROVIDER NOTE - NSFOLLOWUPINSTRUCTIONS_ED_ALL_ED_FT
Follow-up with cardiologist - Our Emergency Department Referral Coordinators will be reaching out to you in the next 24-48 hours from 9:00am to 5:00pm to schedule a follow up appointment. Please expect a phone call from the hospital in that time frame. If you do not receive a call or if you have any questions or concerns, you can reach them at   [phone].    Shortness of breath    Shortness of breath (dyspnea) means you have trouble breathing and could indicate a medical problem. Causes include lung disease, heart disease, low amount of red blood cells (anemia), poor physical fitness, being overweight, smoking, etc. Your health care provider today may not be able to find a cause for your shortness of breath after your exam. In this case, it is important to have a follow-up exam with your primary care physician as instructed. If medicines were prescribed, take them as directed for the full length of time directed. Refrain from tobacco products.    SEEK IMMEDIATE MEDICAL CARE IF YOU HAVE ANY OF THE FOLLOWING SYMPTOMS: worsening shortness of breath, chest pain, back pain, abdominal pain, fever, coughing up blood, lightheadedness/dizziness.

## 2025-05-07 NOTE — ED PROVIDER NOTE - CLINICAL SUMMARY MEDICAL DECISION MAKING FREE TEXT BOX
Patient presented with episode of dyspnea that lasted approx 40 min and then resolved PTA. Hx panic attacks in the past. Otherwise afebrile, Hd stable, well appearing. EKG obtained and non-ischemic without evidence of STEMI. Obtained labs which were grossly unremarkable including no significant leukocytosis, anemia, signs of dehydration/[place], transaminitis or significant electrolyte abnormalities. Trop negative. Chest xray negative for pneumothorax, pneumonia, widened mediastinum, evidence of rib fractures, enlarged cardiac silhouette or any other emergent pathologies. Patient remained without recurrence of dyspnea or abnormalities during monitoring in ED. Ambulatory without difficulty, tolerates PO. HEART Score 3, no PE risk factors, no concern clinically for dissection. Given the above, will discharge home with outpatient follow up. Patient agreeable with plan. Agrees to return to ED for any new or worsening symptoms.

## 2025-05-07 NOTE — ED ADULT NURSE NOTE - IN ACCORDANCE WITH [place] LAW, WE OFFER EVERY PATIENT A HEPATITIS C TEST. WOULD YOU LIKE TO BE TESTED TODAY?
Outcome: Left Message     Please transfer to Patient Outreach Team at [phone] when patient returns call.       Attempt # 2     Opt out

## 2025-05-07 NOTE — ED PROVIDER NOTE - PROGRESS NOTE DETAILS
AY: Had discussion with patient regarding plan - patient prefers to go home, doesn't want to stay for additional testing - will follow-up with a new cardiologist via referral coordinators.

## 2025-05-07 NOTE — ED PROVIDER NOTE - OBJECTIVE STATEMENT
75-year-old male with past medical history of bipolar disorder, CAD, CKD, anxiety, and NSTEMI presents with complaint of shortness of breath.  Patient states he was watching TV when he had an onset of shortness of breath that lasted approximately 40 minutes.  States symptoms felt similar to prior panic attacks he has had in the past.  States he follows with cardiologist Dr. Carrera.  Denies recent illness, fever/chills, chest pain, nausea/vomiting, lightheadedness, dizziness.  States at this time he is asymptomatic.

## 2025-05-07 NOTE — ED PROVIDER NOTE - PATIENT PORTAL LINK FT
You can access the FollowMyHealth Patient Portal offered by [facility] by registering at the following website: [email or web address]. By joining [employer]’s FollowMyHealth portal, you will also be able to view your health information using other applications (apps) compatible with our system.

## 2025-07-11 ENCOUNTER — EMERGENCY (EMERGENCY)
Facility: HOSPITAL | Age: 75
LOS: 0 days | Discharge: HOME | End: 2025-07-12
Attending: EMERGENCY MEDICINE

## 2025-07-11 DIAGNOSIS — Z98.890 OTHER SPECIFIED POSTPROCEDURAL STATES: Chronic | ICD-10-CM

## 2025-07-11 DIAGNOSIS — Z95.5 PRESENCE OF CORONARY ANGIOPLASTY IMPLANT AND GRAFT: Chronic | ICD-10-CM

## 2025-07-11 PROCEDURE — 99283 EMERGENCY DEPT VISIT LOW MDM: CPT

## 2025-07-12 VITALS
OXYGEN SATURATION: 98 % | RESPIRATION RATE: 18 BRPM | DIASTOLIC BLOOD PRESSURE: 86 MMHG | SYSTOLIC BLOOD PRESSURE: 170 MMHG | HEART RATE: 79 BPM | TEMPERATURE: 98 F

## 2025-07-12 RX ORDER — AMOXICILLIN 500 MG/1
1 CAPSULE ORAL
Qty: 14 | Refills: 0
Start: 2025-07-12 | End: 2025-07-18

## 2025-07-12 RX ORDER — ACETAMINOPHEN 500 MG/5ML
975 LIQUID (ML) ORAL ONCE
Refills: 0 | Status: COMPLETED | OUTPATIENT
Start: 2025-07-12 | End: 2025-07-12

## 2025-07-12 RX ORDER — AMOXICILLIN AND CLAVULANATE POTASSIUM 500; 125 MG/1; MG/1
1 TABLET, FILM COATED ORAL ONCE
Refills: 0 | Status: COMPLETED | OUTPATIENT
Start: 2025-07-12 | End: 2025-07-12

## 2025-07-12 RX ADMIN — Medication 975 MILLIGRAM(S): at 01:21

## 2025-07-12 RX ADMIN — AMOXICILLIN AND CLAVULANATE POTASSIUM 1 TABLET(S): 500; 125 TABLET, FILM COATED ORAL at 01:21

## 2025-07-12 NOTE — ED ADULT NURSE NOTE - NSFALLUNIVINTERV_ED_ALL_ED
Monitor gait and stability/Monitor for mental status changes and reorient to person, place, and time, as needed/Bed/Stretcher in lowest position, wheels locked, appropriate side rails in place/Call bell, personal items and telephone in reach/Instruct patient to call for assistance before getting out of bed/chair/stretcher/Non-slip footwear applied when patient is off stretcher/[place] to call system/Physically safe environment - no spills, clutter or unnecessary equipment/Purposeful proactive rounding/Room/bathroom lighting operational, light cord in reach

## 2025-07-12 NOTE — ED PROVIDER NOTE - OBJECTIVE STATEMENT
75-year-old male past medical history NSTEMI presenting to the ED with right-sided mouth pain.  Patient states that he has been to the dentist and was told that he needs to have his teeth removed on the right side.  He says that he started to have tooth pain today and the right side of his mouth hurts.  Denies fever, chills, shortness of breath, difficulty swallowing, nausea, vomiting

## 2025-07-12 NOTE — ED PROVIDER NOTE - PHYSICAL EXAMINATION
VITAL SIGNS: I have reviewed nursing notes and confirm.  CONSTITUTIONAL: Well-appearing, non-toxic, in NAD  SKIN: Warm dry, normal skin turgor  EYES: No conjunctival injection, scleral anicteric  ENT: Moist mucous membranes, right side lower mouth, teeth eroding, no abscess noted 0 -3.73

## 2025-07-12 NOTE — ED PROVIDER NOTE - PATIENT PORTAL LINK FT
You can access the FollowMyHealth Patient Portal offered by [facility] by registering at the following website: [email or web address]. By joining [employer]’s FollowMyHealth portal, you will also be able to view your health information using other applications (apps) compatible with our system.

## 2025-07-15 ENCOUNTER — EMERGENCY (EMERGENCY)
Facility: HOSPITAL | Age: 75
LOS: 0 days | Discharge: LEFT BEFORE DISCHARGE | End: 2025-07-15
Attending: STUDENT IN AN ORGANIZED HEALTH CARE EDUCATION/TRAINING PROGRAM
Payer: MEDICARE

## 2025-07-15 VITALS
TEMPERATURE: 98 F | OXYGEN SATURATION: 98 % | DIASTOLIC BLOOD PRESSURE: 90 MMHG | RESPIRATION RATE: 18 BRPM | SYSTOLIC BLOOD PRESSURE: 139 MMHG | HEART RATE: 78 BPM

## 2025-07-15 DIAGNOSIS — Z95.5 PRESENCE OF CORONARY ANGIOPLASTY IMPLANT AND GRAFT: Chronic | ICD-10-CM

## 2025-07-15 DIAGNOSIS — Z53.21 PROCEDURE AND TREATMENT NOT CARRIED OUT DUE TO PATIENT LEAVING PRIOR TO BEING SEEN BY HEALTH CARE PROVIDER: ICD-10-CM

## 2025-07-15 DIAGNOSIS — Z98.890 OTHER SPECIFIED POSTPROCEDURAL STATES: Chronic | ICD-10-CM

## 2025-07-15 PROCEDURE — L9991: CPT

## 2025-07-15 NOTE — ED ADULT NURSE NOTE - NSSEPSISSUSPECTED_ED_A_ED
Caller:     Reason for Reschedule/Cancellation   (please be detailed, any staff messages or encounters to note?): CONFLICT      Prior to reschedule please review:  Ordering Provider:     GUILLERMO OCASIO     Sedation Determined: MAC  Does patient have any ASC Exclusions, please identify?:       Notes on Cancelled Procedure:  Procedure: Lower Endoscopy [Colonoscopy]   Date: 3/12  Location: [facility]; [address] [address]  Surgeon: LONG      Rescheduled: Yes,   Procedure: Lower Endoscopy [Colonoscopy]    Date: 3/5   Location: [facility]; [address] [address]   Surgeon: LONG   Sedation Level Scheduled  MAC,  Reason for Sedation Level PH   Instructions updated and sent: Y    Does patient need PAC or Pre -Op Rescheduled? : NO       Did you cancel or rescheduled an EUS procedure? No.      No

## 2025-07-18 ENCOUNTER — EMERGENCY (EMERGENCY)
Facility: HOSPITAL | Age: 75
LOS: 0 days | Discharge: HOME | End: 2025-07-18
Attending: EMERGENCY MEDICINE
Payer: MEDICARE

## 2025-07-18 VITALS
RESPIRATION RATE: 17 BRPM | OXYGEN SATURATION: 99 % | HEIGHT: 66 IN | HEART RATE: 95 BPM | TEMPERATURE: 98 F | DIASTOLIC BLOOD PRESSURE: 95 MMHG | WEIGHT: 179.9 LBS | SYSTOLIC BLOOD PRESSURE: 155 MMHG

## 2025-07-18 DIAGNOSIS — Z95.5 PRESENCE OF CORONARY ANGIOPLASTY IMPLANT AND GRAFT: Chronic | ICD-10-CM

## 2025-07-18 DIAGNOSIS — Z95.5 PRESENCE OF CORONARY ANGIOPLASTY IMPLANT AND GRAFT: ICD-10-CM

## 2025-07-18 DIAGNOSIS — R61 GENERALIZED HYPERHIDROSIS: ICD-10-CM

## 2025-07-18 DIAGNOSIS — R25.1 TREMOR, UNSPECIFIED: ICD-10-CM

## 2025-07-18 DIAGNOSIS — Z98.890 OTHER SPECIFIED POSTPROCEDURAL STATES: Chronic | ICD-10-CM

## 2025-07-18 DIAGNOSIS — G47.9 SLEEP DISORDER, UNSPECIFIED: ICD-10-CM

## 2025-07-18 DIAGNOSIS — R26.2 DIFFICULTY IN WALKING, NOT ELSEWHERE CLASSIFIED: ICD-10-CM

## 2025-07-18 PROCEDURE — 99283 EMERGENCY DEPT VISIT LOW MDM: CPT

## 2025-07-18 RX ORDER — CLONAZEPAM 0.5 MG/1
1 TABLET ORAL ONCE
Refills: 0 | Status: DISCONTINUED | OUTPATIENT
Start: 2025-07-18 | End: 2025-07-18

## 2025-07-18 RX ADMIN — CLONAZEPAM 1 MILLIGRAM(S): 0.5 TABLET ORAL at 10:23

## 2025-07-18 NOTE — ED ADULT NURSE NOTE - NSFALLASSISTNEEDED_ED_ALL_ED
SURGEON:  Margot Jacobson M.D.    PREOPERATIVE DIAGNOSIS:    Nuclear Sclerotic Cataract Right Eye    POSTOPERATIVE DIAGNOSIS:    Nuclear Sclerotic Cataract Right Eye    PROCEDURES:    Phacoemulsification with  intraocular lens, Right eye (57741)  With moderate sedation >10min ([address]508)    DATE OF SURGERY: 07/15/2024    IMPLANT: [id number] 17.0    ANESTHESIA:  Under my direct supervision, intravenous moderate sedation was administered during the course of this procedure, with continuous monitoring of hemodynamic parameters. Total time of sedation and amount of sedatives are documented in the nursing logs.    COMPLICATIONS:  None    ESTIMATED BLOOD LOSS: None    SPECIMENS: None    INDICATIONS:    The patient has a history of painless progressive visual loss and difficulty with activities of daily living, which specifically include difficult driving at night due to glare and difficulty reading small print, secondary to cataract formation.  After a thorough discussion of the risks, benefits, and alternatives to cataract surgery, including, but not limited to, the rare risks of infection, retinal detachment, hemorrhage, need for additional surgery, loss of vision, and even loss of the eye, the patient voices understanding and desires to proceed.    DESCRIPTION OF PROCEDURE:    The patients IOL calculations were reviewed, and the lens selection confirmed.   After verification and marking of the proper eye in the preop holding area, the patient was brought to the operating room in supine position where the eye was prepped and draped in standard sterile fashion with 5% Betadine and a lid speculum placed in the eye.   Topical 4% Lidocaine was used in addition to the preoperative anesthesia and the procedure was begun by the creation of a paracentesis incision through which viscoelastic was used to fill the anterior chamber.  Next, a keratome blade was used to create a triplanar temporal clear corneal incision and a cystotome and  Machoata forceps used to fashion a continuous curvilinear capsulorrhexis.  Hydrodissection was carried out using the Baldwin hydrodissection cannula and the nucleus was found to be mobile.  Phacoemulsification of the nucleus was carried out using a quick chop technique, and all remaining epinuclear and cortical material was removed.  The eye was then reformed with Viscoelastic and the  intraocular lens was implanted into the capsular bag.  All remaining viscoelastics were removed from the eye and at the end of the case the pupil was round, the lens was well-centered within the capsular bag and all wounds were found to be water tight.  Drops of Vigamox and Pred Forte were instilled and a shield was placed over the eye. The patient will follow up with Dr. Jacobson in the morning.       Standing

## 2025-07-18 NOTE — ED PROVIDER NOTE - ATTENDING CONTRIBUTION TO CARE
75-year-old male PMH PTSD, anxiety, bipolar disorder, CKD presenting to ED complaining of tremors. Patient states that he ran out of his Klonopin about a week ago, saw his psychiatrist, Dr. Dowd, 2 days ago, but he did not prescribe additional Klonopin.  He is scheduled to see his psychiatrist again in 4 days. Patient denies any additional physical symptoms such as dizziness or lightheadedness, chest pain or shortness of breath, abdominal pain, nausea, vomiting, change in urination. Well-nourished, well-appearing elderly male resting in the stretcher, no tremor observed from a distance, PERRL, pink conjunctiva, MMM, supple neck, lungs clear to auscultation  bilaterally, equal air entry, RRR, well-perfused extremities, distal pulses intact, nontender abdomen, no midline spine or CVA TTP, no tongue fasciculations, no tremor when patient is distracted, he starts selectively shaking his legs or upper extremities intermittently. Ambulating with a steady gait, Awake and alert x 3, no focal neurodeficits, pleasant and cooperative. Will give dose of Klonopin, breakfast, anticipate discharge home. Patient is happy with the plan. He is aware we cannot prescribe Klonopin from the ED.

## 2025-07-18 NOTE — ED PROVIDER NOTE - NSFOLLOWUPINSTRUCTIONS_ED_ALL_ED_FT
Tremor    A tremor is trembling or shaking that you cannot control. Most tremors affect the hands or arms. Tremors can also affect the head, vocal cords, face, and other parts of the body. There are many types of tremors.   Common types include:  Essential tremor. These usually occur in people older than 40. This type of tremor may run in families and can happen in otherwise healthy people.  Resting tremor. These occur when the muscles are at rest, such as when your hands are resting in your lap. People with Parkinson's disease often have resting tremors.  Postural tremor. These occur when you try to hold a pose, such as keeping your hands outstretched.  Kinetic tremor. These occur during purposeful movement, such as trying to touch a finger to your nose.  Task-specific tremor. These may occur when you do certain tasks such as writing, speaking, or standing.  Psychogenic tremor. These are greatly reduced or go away when you are distracted. These tremors happen due to underlying stress or psychiatric disease. They can happen in people of all ages.  Some types of tremors have no known cause. Tremors can also be a symptom of nervous system problems (neurological disorders) that may occur with aging. Some tremors go away with treatment, while others do not.    Follow these instructions at home:    Lifestyle:  A bottle of beer, a glass of wine, and a glass of hard liquor.  A sign showing that a person should not smoke.    If you drink alcohol:  Limit how much you have to:  0–1 drink a day for women who are not pregnant.  0–2 drinks a day for men.  Know how much alcohol is in a drink. In the [place] one drink equals one 12 oz bottle of beer (355 mL), one 5 oz glass of wine (148 mL), or one 1½ oz glass of hard liquor (44 mL).  Do not use any products that contain nicotine or tobacco. These products include cigarettes, chewing tobacco, and vaping devices, such as e-cigarettes. If you need help quitting, ask your health care provider.  Avoid extreme heat and extreme cold.  Limit your caffeine intake, as told by your health care provider.  Try to get 8 hours of sleep each night.  Find ways to manage your stress, such as meditation or yoga.    General instructions    Take over-the-counter and prescription medicines only as told by your health care provider.  Keep all follow-up visits. This is important.  Contact a health care provider if:  You develop a tremor after starting a new medicine.  You have a tremor along with other symptoms such as: Numbness. Tingling. Pain. Weakness.  Your tremor gets worse. Your tremor interferes with your day-to-day life.  Summary  A tremor is trembling or shaking that you cannot control.  Most tremors affect the hands or arms.  Some types of tremors have no known cause. Others may be a symptom of nervous system problems (neurological disorders).  Make sure you discuss any tremors you have with your health care provider.

## 2025-07-18 NOTE — ED PROVIDER NOTE - OBJECTIVE STATEMENT
The pt is a 75 y.o. male with PMH of NSTEMI, bipolar, and anxiety disorder who present with generalized body shaking and diaphoresis that began last night. He was brought in by ambulance his neighbor called for him. He reports sleep disturbances and increased anxiety since his Klonopin finished last week. He follows Dr. Dowd at [facility] for psych, and states their last appointment was on Wednesday. He will not be able to get his prescription until 7/22. He denied etoh and drug use. He denied nausea or vomiting. middle R

## 2025-07-18 NOTE — ED PROVIDER NOTE - PROGRESS NOTE DETAILS
EP: Patient reports  feeling much better, stable for DC.  Advised to keep his appointment with his psychiatrist.

## 2025-07-18 NOTE — ED ADULT NURSE NOTE - NSFALLRISKINTERV_ED_ALL_ED
Assistance OOB with selected safe patient handling equipment if applicable/Assistance with ambulation/Communicate fall risk and risk factors to all staff, patient, and family/Monitor gait and stability/Provide visual cue: yellow wristband, yellow gown, etc/Reinforce activity limits and safety measures with patient and family/Call bell, personal items and telephone in reach/Instruct patient to call for assistance before getting out of bed/chair/stretcher/Non-slip footwear applied when patient is off stretcher/Felt to call system/Physically safe environment - no spills, clutter or unnecessary equipment/Purposeful Proactive Rounding/Room/bathroom lighting operational, light cord in reach

## 2025-07-18 NOTE — ED PROVIDER NOTE - CLINICAL SUMMARY MEDICAL DECISION MAKING FREE TEXT BOX
75-year-old male presenting to ED requesting Klonopin. He complains of tremors and difficulties walking, however, in the ED tremors are only involuntary and he is able to ambulate without any assistance. Dose of Klonopin was given, prior records reviewed, vital signs reviewed. Patient has an appointment with his psychiatrist , Dr Dowd, on 7/22/2025. Of note, he already saw his psychiatrist 2 days ago.

## 2025-07-18 NOTE — ED PROVIDER NOTE - PATIENT PORTAL LINK FT
You can access the FollowMyHealth Patient Portal offered by [facility] by registering at the following website: [email or web address]. By joining [employer]’s FollowMyHealth portal, you will also be able to view your health information using other applications (apps) compatible with our system.

## 2025-07-18 NOTE — ED PROVIDER NOTE - PHYSICAL EXAMINATION
General: NAD, intermittent lower extremity shaking that stops with mental distraction  Head: NCAT  Neck: Supple, no JVD  Cardiac: Regular rate and rhythm. No murmurs.  Pulmonary: CTA b/l, no wheezes  Abdominal: +BS, Soft, nontender, and nondistended  Extremities: No pitting edema  Neurologic: AOx3, nonfocal  Skin: No rashes or lesions

## 2025-07-18 NOTE — ED ADULT TRIAGE NOTE - CHIEF COMPLAINT QUOTE
Patient BIBEMS from home, Per EMS patient complaining of tremors hasn't taken klonopin  in one week due to meds not being available until 7/22. EMS states patients neighbor states he also drinks , Pt denies Alchohol use

## 2025-07-18 NOTE — ED PROVIDER NOTE - PROVIDER TOKENS
FREE:[LAST:[margaux],FIRST:[Walter],PHONE:[phone]],FAX:[phone]   [phone]    -],ADDRESS:[[address] [place] [address]]]

## 2025-07-20 ENCOUNTER — EMERGENCY (EMERGENCY)
Facility: HOSPITAL | Age: 75
LOS: 0 days | Discharge: HOME | End: 2025-07-20
Attending: EMERGENCY MEDICINE
Payer: MEDICARE

## 2025-07-20 VITALS
HEIGHT: 66 IN | OXYGEN SATURATION: 99 % | DIASTOLIC BLOOD PRESSURE: 110 MMHG | RESPIRATION RATE: 18 BRPM | TEMPERATURE: 99 F | SYSTOLIC BLOOD PRESSURE: 151 MMHG | HEART RATE: 88 BPM

## 2025-07-20 DIAGNOSIS — Z87.891 PERSONAL HISTORY OF NICOTINE DEPENDENCE: ICD-10-CM

## 2025-07-20 DIAGNOSIS — Z95.5 PRESENCE OF CORONARY ANGIOPLASTY IMPLANT AND GRAFT: Chronic | ICD-10-CM

## 2025-07-20 DIAGNOSIS — F31.9 BIPOLAR DISORDER, UNSPECIFIED: ICD-10-CM

## 2025-07-20 DIAGNOSIS — R25.1 TREMOR, UNSPECIFIED: ICD-10-CM

## 2025-07-20 DIAGNOSIS — Z98.890 OTHER SPECIFIED POSTPROCEDURAL STATES: Chronic | ICD-10-CM

## 2025-07-20 DIAGNOSIS — F13.239 SEDATIVE, HYPNOTIC OR ANXIOLYTIC DEPENDENCE WITH WITHDRAWAL, UNSPECIFIED: ICD-10-CM

## 2025-07-20 DIAGNOSIS — F41.9 ANXIETY DISORDER, UNSPECIFIED: ICD-10-CM

## 2025-07-20 PROCEDURE — 82962 GLUCOSE BLOOD TEST: CPT

## 2025-07-20 PROCEDURE — 99284 EMERGENCY DEPT VISIT MOD MDM: CPT | Mod: GC

## 2025-07-20 PROCEDURE — 99283 EMERGENCY DEPT VISIT LOW MDM: CPT

## 2025-07-20 RX ORDER — CLONAZEPAM 0.5 MG/1
1 TABLET ORAL ONCE
Refills: 0 | Status: DISCONTINUED | OUTPATIENT
Start: 2025-07-20 | End: 2025-07-20

## 2025-07-20 RX ADMIN — CLONAZEPAM 1 MILLIGRAM(S): 0.5 TABLET ORAL at 09:04

## 2025-07-20 NOTE — ED PROVIDER NOTE - PATIENT PORTAL LINK FT
You can access the FollowMyHealth Patient Portal offered by [facility] by registering at the following website: [email or web address]. By joining [employer]’s FollowMyHealth portal, you will also be able to view your health information using other applications (apps) compatible with our system.

## 2025-07-20 NOTE — ED PROVIDER NOTE - CLINICAL SUMMARY MEDICAL DECISION MAKING FREE TEXT BOX
Agree with above history and exam.  Patient here with mild withdrawal from Klonopin given single dose with resolution of symptoms/tremor.  Patient has follow-up in 2 days  advised to return if symptoms return prior to getting refill of Klonopin.

## 2025-07-20 NOTE — ED PROVIDER NOTE - PHYSICAL EXAMINATION
Vital Signs: I have reviewed the initial vital signs.  Constitutional: NAD, well-nourished, appears stated age, no acute distress, intermittent lower and upper extremity shaking  HEENT: Airway patent, moist MM, no erythema/swelling/deformity of oral structures. EOMI, PERRLA.  CV: regular rate, regular rhythm, well-perfused extremities, 2+ b/l radial pulses equal.  Lungs: BCTA, no increased WOB.  ABD: NTND, no guarding or rebound, no pulsatile mass, no hernias.   MSK: Neck supple, nontender, nl ROM, no stepoff. Chest nontender. Ext nontender, nl rom, no deformity.   INTEG: Skin warm, dry, no rash.  NEURO: A&Ox3, normal strength, nl sensation throughout, normal speech,  PSYCH: Calm, cooperative, normal affect and interaction.

## 2025-07-20 NOTE — ED PROVIDER NOTE - PROGRESS NOTE DETAILS
Pt feels better and no more extremity shaking after klonopin dose. Pt wants a new psychiatrist. Pt feels better and no more extremity shaking after klonopin dose. Pt wants a new psychiatrist. Pt feels comfortable to go home.

## 2025-07-20 NOTE — ED PROVIDER NOTE - NSFOLLOWUPINSTRUCTIONS_ED_ALL_ED_FT
Please keep your appointment with your psychiatrist Dr. Dowd 7/22/25.     Our Emergency Department Referral Coordinators will be reaching out to you in the next 24-48 hours from 9:00am to 5:00pm with a follow up appointment. Please expect a phone call from the hospital in that time frame. If you do not receive a call or if you have any questions or concerns, you can reach them at [phone] or [phone].    Tremors    WHAT YOU NEED TO KNOW:    What is a tremor? A tremor is a movement you cannot control that occurs in a rhythm. Tremors most commonly occur in the hands. Other common places include the head or face, trunk, or legs. Your voice can also have a tremor and sound shaky when you speak. A tremor may be caused by a nerve problem, too much thyroid hormone, or by certain medicines, caffeine, or alcohol. Tremors may be temporary or permanent. The tremor may go away and return, or worsen with stress. Tremors can happen at any age, but they are more common in later years.    What are the types of tremors? A tremor may happen when you are at rest. For example, your hands may move even though you are resting them in your lap. Tremors may also happen when you move. You may have a tremor when you hold your arms out, or when you move your wrists. A tremor may happen when you try to reach for something, or when you write or speak. You may also have a tremor when you contract a muscle, even if you do not move the body part. You may have tremors in your legs when you stand. Anxiety or a strong emotion can also cause tremors.    What increases my risk for tremors?    Older age    Certain medicines, such as steroids, antipsychotics, or amphetamines    A family history of tremors    A condition such as Parkinson disease, multiple sclerosis, a thyroid problem, or liver failure    A brain injury, stroke, or disease that affects your brainstem    Alcohol abuse, or withdrawal from alcohol  How is the cause of a tremor diagnosed? Your healthcare provider will ask about your tremors and when they began. Tell your provider if you have tremors on one or both sides of your body. Tell your provider if anything triggers a tremor or makes it stop. Your provider may also ask if anyone in your family has a history of tremors.    Blood or urine tests may be used to check for medicines, illegal drugs, or alcohol.    A neurologic exam may be used to check for nerve damage. Your healthcare provider may ask you to use an eating utensil or write words on paper. You may be asked to touch your finger to your nose. These tests can help your provider see tremors that happen during these activities.    CT or MRI pictures may be used to check for a brain problem or injury. You may be given contrast liquid to help your brain show up better in the pictures. Tell the healthcare provider if you have ever had an allergic reaction to contrast liquid. Do not enter the MRI room with anything metal. Metal can cause serious injury. Tell the healthcare provider if you have any metal in or on your body.    An electromyogram may be used to check for muscle or nerve problems. A machine measures the activity in your muscles when the muscles are stimulated. A needle with a device called an electrode attached to the end will be inserted into your muscle through your skin. An electric current is sent through the needle and into the muscle. Your provider may measure muscle activity when you are at rest and when you move the muscle.  How are tremors treated? You may not need treatment if your tremor is mild. You may need treatment for a condition that can cause tremors, such as a thyroid disorder. Your healthcare provider may stop or change a medicine that may be causing tremors. Do not stop or change any medicine unless your doctor tells you to.    Medicines may be used to help control some kinds of tremors.    Deep brain stimulation is used to control some types of tremors. Electric currents are delivered into parts of the brain that control movement. The currents disrupt the tremor.    Thalamotomy is surgery that may be used to control some types of tremors if other treatments do not work. During surgery, parts of the thalamus are destroyed. The thalamus is a small area of the brain. It is located deep inside the brain and helps control movement.  What can I do to manage my symptoms?    Do not have caffeine or other chemicals that affect your nerves. Limit or do not have caffeine. Caffeine can make tremors worse. Talk to your healthcare provider about herbal medicines, teas, and supplements. They may also increase tremors. Do not use illegal drugs.    Go to physical and occupational therapy as directed. A physical therapist can teach you exercises to help reduce the tremor and improve muscle control. You may be shown how to hold the body part during a tremor to help control the movement. The therapist can also help you build strength and balance. An occupational therapist can show you how to use adaptive devices to help you move more easily.    Use objects that will help you control movements. You may have more hand control if you add a watch or bracelet to your wrist. It may be easier for you to drink from a straw, or to fill your cup only half full. Cups with lids, such as travel mugs, can also help you drink with more control. Heavy eating utensils can help you eat more easily. A button fastener can help you button clothing if tremors in your hands make this difficult.    Set a regular sleep schedule. Lack of sleep can make tremors worse. Try to go to bed at the same time each night and wake up at the same time each morning.  When should I contact my healthcare provider?    You have a new or worsening tremor.    You have tremors that make it difficult to do your regular activities.    You have questions or concerns about your condition or care.

## 2025-07-20 NOTE — ED PROVIDER NOTE - OBJECTIVE STATEMENT
75-year-old male with past medical history of PTSD, anxiety, BPD presenting to the ED complaining of tremors. Patient states that yesterday both his legs and arms started shaking.  Patient states that this happens when he does not take his Klonopin medication.  Patient ran out of his Klonopin medication and his psychiatrist, Dr. Dowd at [facility] said he will be next seen on July 22.  Patient was seen here 2 days ago for a similar complaint and felt better after Klonopin dose was given.  Patient denies any other complaints such as chest pain, shortness of breath, abdominal pain, fever, nausea, vomiting.  Patient denies any drug or alcohol abuse.

## 2025-07-24 ENCOUNTER — INPATIENT (INPATIENT)
Facility: HOSPITAL | Age: 75
LOS: 1 days | Discharge: LEFT BEFORE DISCHARGE | DRG: 999 | End: 2025-07-26
Attending: STUDENT IN AN ORGANIZED HEALTH CARE EDUCATION/TRAINING PROGRAM | Admitting: INTERNAL MEDICINE
Payer: MEDICARE

## 2025-07-24 VITALS
WEIGHT: 169.98 LBS | RESPIRATION RATE: 18 BRPM | SYSTOLIC BLOOD PRESSURE: 139 MMHG | DIASTOLIC BLOOD PRESSURE: 89 MMHG | OXYGEN SATURATION: 99 % | TEMPERATURE: 98 F | HEIGHT: 66 IN | HEART RATE: 78 BPM

## 2025-07-24 DIAGNOSIS — Z95.5 PRESENCE OF CORONARY ANGIOPLASTY IMPLANT AND GRAFT: Chronic | ICD-10-CM

## 2025-07-24 DIAGNOSIS — Z98.890 OTHER SPECIFIED POSTPROCEDURAL STATES: Chronic | ICD-10-CM

## 2025-07-24 LAB
ALBUMIN SERPL ELPH-MCNC: 4.2 G/DL — SIGNIFICANT CHANGE UP (ref 3.5–5.2)
ALP SERPL-CCNC: 87 U/L — SIGNIFICANT CHANGE UP (ref 30–115)
ALT FLD-CCNC: 8 U/L — SIGNIFICANT CHANGE UP (ref 0–41)
ANION GAP SERPL CALC-SCNC: 12 MMOL/L — SIGNIFICANT CHANGE UP (ref 7–14)
AST SERPL-CCNC: 17 U/L — SIGNIFICANT CHANGE UP (ref 0–41)
BASOPHILS # BLD AUTO: 0.07 K/UL — SIGNIFICANT CHANGE UP (ref 0–0.2)
BASOPHILS NFR BLD AUTO: 0.7 % — SIGNIFICANT CHANGE UP (ref 0–1)
BILIRUB SERPL-MCNC: 0.3 MG/DL — SIGNIFICANT CHANGE UP (ref 0.2–1.2)
BUN SERPL-MCNC: 13 MG/DL — SIGNIFICANT CHANGE UP (ref 10–20)
CALCIUM SERPL-MCNC: 9.8 MG/DL — SIGNIFICANT CHANGE UP (ref 8.4–10.5)
CHLORIDE SERPL-SCNC: 107 MMOL/L — SIGNIFICANT CHANGE UP (ref 98–110)
CO2 SERPL-SCNC: 19 MMOL/L — SIGNIFICANT CHANGE UP (ref 17–32)
CREAT SERPL-MCNC: 1.4 MG/DL — SIGNIFICANT CHANGE UP (ref 0.7–1.5)
EGFR: 52 ML/MIN/1.73M2 — LOW
EGFR: 52 ML/MIN/1.73M2 — LOW
EOSINOPHIL # BLD AUTO: 0.46 K/UL — SIGNIFICANT CHANGE UP (ref 0–0.7)
EOSINOPHIL NFR BLD AUTO: 4.8 % — SIGNIFICANT CHANGE UP (ref 0–8)
GLUCOSE SERPL-MCNC: 93 MG/DL — SIGNIFICANT CHANGE UP (ref 70–99)
HCT VFR BLD CALC: 41.9 % — LOW (ref 42–52)
HGB BLD-MCNC: 14.9 G/DL — SIGNIFICANT CHANGE UP (ref 14–18)
IMM GRANULOCYTES NFR BLD AUTO: 0.2 % — SIGNIFICANT CHANGE UP (ref 0.1–0.3)
LYMPHOCYTES # BLD AUTO: 2.51 K/UL — SIGNIFICANT CHANGE UP (ref 1.2–3.4)
LYMPHOCYTES # BLD AUTO: 26.1 % — SIGNIFICANT CHANGE UP (ref 20.5–51.1)
MCHC RBC-ENTMCNC: 34.7 PG — HIGH (ref 27–31)
MCHC RBC-ENTMCNC: 35.6 G/DL — SIGNIFICANT CHANGE UP (ref 32–37)
MCV RBC AUTO: 97.4 FL — HIGH (ref 80–94)
MONOCYTES # BLD AUTO: 0.38 K/UL — SIGNIFICANT CHANGE UP (ref 0.1–0.6)
MONOCYTES NFR BLD AUTO: 4 % — SIGNIFICANT CHANGE UP (ref 1.7–9.3)
NEUTROPHILS # BLD AUTO: 6.17 K/UL — SIGNIFICANT CHANGE UP (ref 1.4–6.5)
NEUTROPHILS NFR BLD AUTO: 64.2 % — SIGNIFICANT CHANGE UP (ref 42.2–75.2)
NRBC BLD AUTO-RTO: 0 /100 WBCS — SIGNIFICANT CHANGE UP (ref 0–0)
PLATELET # BLD AUTO: 209 K/UL — SIGNIFICANT CHANGE UP (ref 130–400)
PMV BLD: 9 FL — SIGNIFICANT CHANGE UP (ref 7.4–10.4)
POTASSIUM SERPL-MCNC: 3.9 MMOL/L — SIGNIFICANT CHANGE UP (ref 3.5–5)
POTASSIUM SERPL-SCNC: 3.9 MMOL/L — SIGNIFICANT CHANGE UP (ref 3.5–5)
PROT SERPL-MCNC: 6.4 G/DL — SIGNIFICANT CHANGE UP (ref 6–8)
RBC # BLD: 4.3 M/UL — LOW (ref 4.7–6.1)
RBC # FLD: 13.2 % — SIGNIFICANT CHANGE UP (ref 11.5–14.5)
SODIUM SERPL-SCNC: 138 MMOL/L — SIGNIFICANT CHANGE UP (ref 135–146)
WBC # BLD: 9.61 K/UL — SIGNIFICANT CHANGE UP (ref 4.8–10.8)
WBC # FLD AUTO: 9.61 K/UL — SIGNIFICANT CHANGE UP (ref 4.8–10.8)

## 2025-07-24 PROCEDURE — 71045 X-RAY EXAM CHEST 1 VIEW: CPT | Mod: 26

## 2025-07-24 PROCEDURE — 99285 EMERGENCY DEPT VISIT HI MDM: CPT | Mod: FS

## 2025-07-24 PROCEDURE — 70450 CT HEAD/BRAIN W/O DYE: CPT | Mod: 26

## 2025-07-24 RX ORDER — CLONAZEPAM 0.5 MG/1
0.5 TABLET ORAL ONCE
Refills: 0 | Status: DISCONTINUED | OUTPATIENT
Start: 2025-07-24 | End: 2025-07-24

## 2025-07-24 RX ADMIN — CLONAZEPAM 0.5 MILLIGRAM(S): 0.5 TABLET ORAL at 22:12

## 2025-07-24 RX ADMIN — Medication 2000 MILLILITER(S): at 21:56

## 2025-07-25 DIAGNOSIS — R40.4 TRANSIENT ALTERATION OF AWARENESS: ICD-10-CM

## 2025-07-25 DIAGNOSIS — R29.818 OTHER SYMPTOMS AND SIGNS INVOLVING THE NERVOUS SYSTEM: ICD-10-CM

## 2025-07-25 LAB
AMMONIA BLD-MCNC: 28 UMOL/L — SIGNIFICANT CHANGE UP (ref 11–55)
ANION GAP SERPL CALC-SCNC: 13 MMOL/L — SIGNIFICANT CHANGE UP (ref 7–14)
APPEARANCE UR: CLEAR — SIGNIFICANT CHANGE UP
BASOPHILS # BLD AUTO: 0.08 K/UL — SIGNIFICANT CHANGE UP (ref 0–0.2)
BASOPHILS NFR BLD AUTO: 0.9 % — SIGNIFICANT CHANGE UP (ref 0–1)
BILIRUB UR-MCNC: NEGATIVE — SIGNIFICANT CHANGE UP
BUN SERPL-MCNC: 13 MG/DL — SIGNIFICANT CHANGE UP (ref 10–20)
CALCIUM SERPL-MCNC: 9.6 MG/DL — SIGNIFICANT CHANGE UP (ref 8.4–10.5)
CHLORIDE SERPL-SCNC: 110 MMOL/L — SIGNIFICANT CHANGE UP (ref 98–110)
CO2 SERPL-SCNC: 19 MMOL/L — SIGNIFICANT CHANGE UP (ref 17–32)
COLOR SPEC: YELLOW — SIGNIFICANT CHANGE UP
CREAT SERPL-MCNC: 1.2 MG/DL — SIGNIFICANT CHANGE UP (ref 0.7–1.5)
DIFF PNL FLD: NEGATIVE — SIGNIFICANT CHANGE UP
EGFR: 63 ML/MIN/1.73M2 — SIGNIFICANT CHANGE UP
EGFR: 63 ML/MIN/1.73M2 — SIGNIFICANT CHANGE UP
EOSINOPHIL # BLD AUTO: 0.45 K/UL — SIGNIFICANT CHANGE UP (ref 0–0.7)
EOSINOPHIL NFR BLD AUTO: 5.2 % — SIGNIFICANT CHANGE UP (ref 0–8)
GLUCOSE BLDC GLUCOMTR-MCNC: 94 MG/DL — SIGNIFICANT CHANGE UP (ref 70–99)
GLUCOSE SERPL-MCNC: 113 MG/DL — HIGH (ref 70–99)
GLUCOSE UR QL: NEGATIVE MG/DL — SIGNIFICANT CHANGE UP
HCT VFR BLD CALC: 40.8 % — LOW (ref 42–52)
HGB BLD-MCNC: 14.2 G/DL — SIGNIFICANT CHANGE UP (ref 14–18)
IMM GRANULOCYTES NFR BLD AUTO: 0.3 % — SIGNIFICANT CHANGE UP (ref 0.1–0.3)
KETONES UR QL: NEGATIVE MG/DL — SIGNIFICANT CHANGE UP
LEUKOCYTE ESTERASE UR-ACNC: ABNORMAL
LITHIUM SERPL-MCNC: 0.74 MMOL/L — SIGNIFICANT CHANGE UP (ref 0.6–1.2)
LYMPHOCYTES # BLD AUTO: 2.67 K/UL — SIGNIFICANT CHANGE UP (ref 1.2–3.4)
LYMPHOCYTES # BLD AUTO: 30.8 % — SIGNIFICANT CHANGE UP (ref 20.5–51.1)
MAGNESIUM SERPL-MCNC: 2.2 MG/DL — SIGNIFICANT CHANGE UP (ref 1.8–2.4)
MCHC RBC-ENTMCNC: 34.2 PG — HIGH (ref 27–31)
MCHC RBC-ENTMCNC: 34.8 G/DL — SIGNIFICANT CHANGE UP (ref 32–37)
MCV RBC AUTO: 98.3 FL — HIGH (ref 80–94)
MONOCYTES # BLD AUTO: 0.33 K/UL — SIGNIFICANT CHANGE UP (ref 0.1–0.6)
MONOCYTES NFR BLD AUTO: 3.8 % — SIGNIFICANT CHANGE UP (ref 1.7–9.3)
NEUTROPHILS # BLD AUTO: 5.1 K/UL — SIGNIFICANT CHANGE UP (ref 1.4–6.5)
NEUTROPHILS NFR BLD AUTO: 59 % — SIGNIFICANT CHANGE UP (ref 42.2–75.2)
NITRITE UR-MCNC: NEGATIVE — SIGNIFICANT CHANGE UP
NRBC BLD AUTO-RTO: 0 /100 WBCS — SIGNIFICANT CHANGE UP (ref 0–0)
PH UR: 6 — SIGNIFICANT CHANGE UP (ref 5–8)
PLATELET # BLD AUTO: 185 K/UL — SIGNIFICANT CHANGE UP (ref 130–400)
PMV BLD: 9.6 FL — SIGNIFICANT CHANGE UP (ref 7.4–10.4)
POTASSIUM SERPL-MCNC: 4 MMOL/L — SIGNIFICANT CHANGE UP (ref 3.5–5)
POTASSIUM SERPL-SCNC: 4 MMOL/L — SIGNIFICANT CHANGE UP (ref 3.5–5)
PROT UR-MCNC: NEGATIVE MG/DL — SIGNIFICANT CHANGE UP
RBC # BLD: 4.15 M/UL — LOW (ref 4.7–6.1)
RBC # FLD: 13.4 % — SIGNIFICANT CHANGE UP (ref 11.5–14.5)
SODIUM SERPL-SCNC: 142 MMOL/L — SIGNIFICANT CHANGE UP (ref 135–146)
SP GR SPEC: 1.01 — SIGNIFICANT CHANGE UP (ref 1–1.03)
TROPONIN T, HIGH SENSITIVITY RESULT: 12 NG/L — SIGNIFICANT CHANGE UP (ref 6–21)
TROPONIN T, HIGH SENSITIVITY RESULT: 12 NG/L — SIGNIFICANT CHANGE UP (ref 6–21)
TROPONIN T, HIGH SENSITIVITY RESULT: 15 NG/L — SIGNIFICANT CHANGE UP (ref 6–21)
UROBILINOGEN FLD QL: 0.2 MG/DL — SIGNIFICANT CHANGE UP (ref 0.2–1)
WBC # BLD: 8.66 K/UL — SIGNIFICANT CHANGE UP (ref 4.8–10.8)
WBC # FLD AUTO: 8.66 K/UL — SIGNIFICANT CHANGE UP (ref 4.8–10.8)

## 2025-07-25 PROCEDURE — 82962 GLUCOSE BLOOD TEST: CPT

## 2025-07-25 PROCEDURE — 80178 ASSAY OF LITHIUM: CPT

## 2025-07-25 PROCEDURE — 93005 ELECTROCARDIOGRAM TRACING: CPT

## 2025-07-25 PROCEDURE — 82607 VITAMIN B-12: CPT

## 2025-07-25 PROCEDURE — 87476 LYME DIS DNA AMP PROBE: CPT

## 2025-07-25 PROCEDURE — 80048 BASIC METABOLIC PNL TOTAL CA: CPT

## 2025-07-25 PROCEDURE — 82652 VIT D 1 25-DIHYDROXY: CPT

## 2025-07-25 PROCEDURE — 87389 HIV-1 AG W/HIV-1&-2 AB AG IA: CPT

## 2025-07-25 PROCEDURE — 90792 PSYCH DIAG EVAL W/MED SRVCS: CPT

## 2025-07-25 PROCEDURE — 93010 ELECTROCARDIOGRAM REPORT: CPT

## 2025-07-25 PROCEDURE — 85025 COMPLETE CBC W/AUTO DIFF WBC: CPT

## 2025-07-25 PROCEDURE — 36415 COLL VENOUS BLD VENIPUNCTURE: CPT

## 2025-07-25 PROCEDURE — 99222 1ST HOSP IP/OBS MODERATE 55: CPT | Mod: AI

## 2025-07-25 PROCEDURE — 86780 TREPONEMA PALLIDUM: CPT

## 2025-07-25 PROCEDURE — 84443 ASSAY THYROID STIM HORMONE: CPT

## 2025-07-25 PROCEDURE — 82140 ASSAY OF AMMONIA: CPT

## 2025-07-25 PROCEDURE — 83735 ASSAY OF MAGNESIUM: CPT

## 2025-07-25 PROCEDURE — 84484 ASSAY OF TROPONIN QUANT: CPT

## 2025-07-25 PROCEDURE — 80053 COMPREHEN METABOLIC PANEL: CPT

## 2025-07-25 RX ORDER — HEPARIN SODIUM 1000 [USP'U]/ML
5000 INJECTION INTRAVENOUS; SUBCUTANEOUS EVERY 12 HOURS
Refills: 0 | Status: DISCONTINUED | OUTPATIENT
Start: 2025-07-25 | End: 2025-07-26

## 2025-07-25 RX ORDER — ATORVASTATIN CALCIUM 80 MG/1
80 TABLET, FILM COATED ORAL AT BEDTIME
Refills: 0 | Status: DISCONTINUED | OUTPATIENT
Start: 2025-07-25 | End: 2025-07-26

## 2025-07-25 RX ORDER — CLOPIDOGREL BISULFATE 75 MG/1
75 TABLET, FILM COATED ORAL DAILY
Refills: 0 | Status: DISCONTINUED | OUTPATIENT
Start: 2025-07-25 | End: 2025-07-26

## 2025-07-25 RX ORDER — ASPIRIN 325 MG
81 TABLET ORAL DAILY
Refills: 0 | Status: DISCONTINUED | OUTPATIENT
Start: 2025-07-25 | End: 2025-07-26

## 2025-07-25 RX ORDER — HYDROXYZINE HYDROCHLORIDE 25 MG/1
25 TABLET, FILM COATED ORAL DAILY
Refills: 0 | Status: DISCONTINUED | OUTPATIENT
Start: 2025-07-25 | End: 2025-07-26

## 2025-07-25 RX ORDER — CLONAZEPAM 0.5 MG/1
1 TABLET ORAL
Refills: 0 | Status: DISCONTINUED | OUTPATIENT
Start: 2025-07-25 | End: 2025-07-25

## 2025-07-25 RX ORDER — NITROGLYCERIN 20 MG/G
0.4 OINTMENT TOPICAL
Refills: 0 | Status: COMPLETED | OUTPATIENT
Start: 2025-07-25 | End: 2025-07-25

## 2025-07-25 RX ORDER — METOPROLOL SUCCINATE 50 MG/1
25 TABLET, EXTENDED RELEASE ORAL
Refills: 0 | Status: DISCONTINUED | OUTPATIENT
Start: 2025-07-25 | End: 2025-07-26

## 2025-07-25 RX ORDER — NITROGLYCERIN 20 MG/G
0.4 OINTMENT TOPICAL ONCE
Refills: 0 | Status: DISCONTINUED | OUTPATIENT
Start: 2025-07-25 | End: 2025-07-25

## 2025-07-25 RX ORDER — SERTRALINE 100 MG/1
100 TABLET, FILM COATED ORAL DAILY
Refills: 0 | Status: DISCONTINUED | OUTPATIENT
Start: 2025-07-25 | End: 2025-07-26

## 2025-07-25 RX ORDER — LITHIUM CARBONATE 600 MG/1
300 CAPSULE, GELATIN COATED ORAL EVERY 12 HOURS
Refills: 0 | Status: DISCONTINUED | OUTPATIENT
Start: 2025-07-25 | End: 2025-07-26

## 2025-07-25 RX ORDER — SERTRALINE 100 MG/1
150 TABLET, FILM COATED ORAL DAILY
Refills: 0 | Status: DISCONTINUED | OUTPATIENT
Start: 2025-07-25 | End: 2025-07-25

## 2025-07-25 RX ORDER — SERTRALINE 100 MG/1
50 TABLET, FILM COATED ORAL DAILY
Refills: 0 | Status: DISCONTINUED | OUTPATIENT
Start: 2025-07-25 | End: 2025-07-26

## 2025-07-25 RX ORDER — CLONAZEPAM 0.5 MG/1
1 TABLET ORAL EVERY 12 HOURS
Refills: 0 | Status: DISCONTINUED | OUTPATIENT
Start: 2025-07-25 | End: 2025-07-26

## 2025-07-25 RX ORDER — HALOPERIDOL 10 MG/1
5 TABLET ORAL EVERY 6 HOURS
Refills: 0 | Status: DISCONTINUED | OUTPATIENT
Start: 2025-07-25 | End: 2025-07-26

## 2025-07-25 RX ORDER — HALOPERIDOL 10 MG/1
2 TABLET ORAL ONCE
Refills: 0 | Status: COMPLETED | OUTPATIENT
Start: 2025-07-25 | End: 2025-07-25

## 2025-07-25 RX ADMIN — Medication 81 MILLIGRAM(S): at 12:35

## 2025-07-25 RX ADMIN — HYDROXYZINE HYDROCHLORIDE 25 MILLIGRAM(S): 25 TABLET, FILM COATED ORAL at 21:18

## 2025-07-25 RX ADMIN — LITHIUM CARBONATE 300 MILLIGRAM(S): 600 CAPSULE, GELATIN COATED ORAL at 18:33

## 2025-07-25 RX ADMIN — SERTRALINE 50 MILLIGRAM(S): 100 TABLET, FILM COATED ORAL at 12:35

## 2025-07-25 RX ADMIN — NITROGLYCERIN 0.4 MILLIGRAM(S): 20 OINTMENT TOPICAL at 14:32

## 2025-07-25 RX ADMIN — CLONAZEPAM 1 MILLIGRAM(S): 0.5 TABLET ORAL at 18:19

## 2025-07-25 RX ADMIN — METOPROLOL SUCCINATE 25 MILLIGRAM(S): 50 TABLET, EXTENDED RELEASE ORAL at 18:19

## 2025-07-25 RX ADMIN — CLOPIDOGREL BISULFATE 75 MILLIGRAM(S): 75 TABLET, FILM COATED ORAL at 12:35

## 2025-07-25 RX ADMIN — NITROGLYCERIN 0.4 MILLIGRAM(S): 20 OINTMENT TOPICAL at 14:15

## 2025-07-25 RX ADMIN — HEPARIN SODIUM 5000 UNIT(S): 1000 INJECTION INTRAVENOUS; SUBCUTANEOUS at 18:23

## 2025-07-25 RX ADMIN — NITROGLYCERIN 0.4 MILLIGRAM(S): 20 OINTMENT TOPICAL at 14:06

## 2025-07-25 RX ADMIN — HALOPERIDOL 2 MILLIGRAM(S): 10 TABLET ORAL at 16:48

## 2025-07-25 RX ADMIN — Medication 1 APPLICATION(S): at 05:50

## 2025-07-25 RX ADMIN — ATORVASTATIN CALCIUM 80 MILLIGRAM(S): 80 TABLET, FILM COATED ORAL at 21:18

## 2025-07-25 RX ADMIN — SERTRALINE 100 MILLIGRAM(S): 100 TABLET, FILM COATED ORAL at 12:35

## 2025-07-26 ENCOUNTER — TRANSCRIPTION ENCOUNTER (OUTPATIENT)
Age: 75
End: 2025-07-26

## 2025-07-26 VITALS
SYSTOLIC BLOOD PRESSURE: 126 MMHG | RESPIRATION RATE: 18 BRPM | DIASTOLIC BLOOD PRESSURE: 79 MMHG | TEMPERATURE: 98 F | HEART RATE: 60 BPM

## 2025-07-26 LAB
ALBUMIN SERPL ELPH-MCNC: 3.9 G/DL — SIGNIFICANT CHANGE UP (ref 3.5–5.2)
ALP SERPL-CCNC: 75 U/L — SIGNIFICANT CHANGE UP (ref 30–115)
ALT FLD-CCNC: 8 U/L — SIGNIFICANT CHANGE UP (ref 0–41)
ANION GAP SERPL CALC-SCNC: 10 MMOL/L — SIGNIFICANT CHANGE UP (ref 7–14)
AST SERPL-CCNC: 22 U/L — SIGNIFICANT CHANGE UP (ref 0–41)
BASOPHILS # BLD AUTO: 0.06 K/UL — SIGNIFICANT CHANGE UP (ref 0–0.2)
BASOPHILS NFR BLD AUTO: 0.6 % — SIGNIFICANT CHANGE UP (ref 0–1)
BILIRUB SERPL-MCNC: 0.5 MG/DL — SIGNIFICANT CHANGE UP (ref 0.2–1.2)
BUN SERPL-MCNC: 11 MG/DL — SIGNIFICANT CHANGE UP (ref 10–20)
CALCIUM SERPL-MCNC: 9.3 MG/DL — SIGNIFICANT CHANGE UP (ref 8.4–10.5)
CHLORIDE SERPL-SCNC: 111 MMOL/L — HIGH (ref 98–110)
CO2 SERPL-SCNC: 20 MMOL/L — SIGNIFICANT CHANGE UP (ref 17–32)
CREAT SERPL-MCNC: 1.2 MG/DL — SIGNIFICANT CHANGE UP (ref 0.7–1.5)
EGFR: 63 ML/MIN/1.73M2 — SIGNIFICANT CHANGE UP
EGFR: 63 ML/MIN/1.73M2 — SIGNIFICANT CHANGE UP
EOSINOPHIL # BLD AUTO: 0.48 K/UL — SIGNIFICANT CHANGE UP (ref 0–0.7)
EOSINOPHIL NFR BLD AUTO: 4.7 % — SIGNIFICANT CHANGE UP (ref 0–8)
GLUCOSE SERPL-MCNC: 104 MG/DL — HIGH (ref 70–99)
HCT VFR BLD CALC: 40.6 % — LOW (ref 42–52)
HGB BLD-MCNC: 14 G/DL — SIGNIFICANT CHANGE UP (ref 14–18)
HIV 1+2 AB+HIV1 P24 AG SERPL QL IA: SIGNIFICANT CHANGE UP
IMM GRANULOCYTES NFR BLD AUTO: 0.3 % — SIGNIFICANT CHANGE UP (ref 0.1–0.3)
LYMPHOCYTES # BLD AUTO: 2.99 K/UL — SIGNIFICANT CHANGE UP (ref 1.2–3.4)
LYMPHOCYTES # BLD AUTO: 29.2 % — SIGNIFICANT CHANGE UP (ref 20.5–51.1)
MAGNESIUM SERPL-MCNC: 2.1 MG/DL — SIGNIFICANT CHANGE UP (ref 1.8–2.4)
MCHC RBC-ENTMCNC: 33.8 PG — HIGH (ref 27–31)
MCHC RBC-ENTMCNC: 34.5 G/DL — SIGNIFICANT CHANGE UP (ref 32–37)
MCV RBC AUTO: 98.1 FL — HIGH (ref 80–94)
MONOCYTES # BLD AUTO: 0.47 K/UL — SIGNIFICANT CHANGE UP (ref 0.1–0.6)
MONOCYTES NFR BLD AUTO: 4.6 % — SIGNIFICANT CHANGE UP (ref 1.7–9.3)
NEUTROPHILS # BLD AUTO: 6.22 K/UL — SIGNIFICANT CHANGE UP (ref 1.4–6.5)
NEUTROPHILS NFR BLD AUTO: 60.6 % — SIGNIFICANT CHANGE UP (ref 42.2–75.2)
NRBC BLD AUTO-RTO: 0 /100 WBCS — SIGNIFICANT CHANGE UP (ref 0–0)
PLATELET # BLD AUTO: 193 K/UL — SIGNIFICANT CHANGE UP (ref 130–400)
PMV BLD: 9.3 FL — SIGNIFICANT CHANGE UP (ref 7.4–10.4)
POTASSIUM SERPL-MCNC: 4 MMOL/L — SIGNIFICANT CHANGE UP (ref 3.5–5)
POTASSIUM SERPL-SCNC: 4 MMOL/L — SIGNIFICANT CHANGE UP (ref 3.5–5)
PROT SERPL-MCNC: 5.7 G/DL — LOW (ref 6–8)
RBC # BLD: 4.14 M/UL — LOW (ref 4.7–6.1)
RBC # FLD: 13.2 % — SIGNIFICANT CHANGE UP (ref 11.5–14.5)
SODIUM SERPL-SCNC: 141 MMOL/L — SIGNIFICANT CHANGE UP (ref 135–146)
T PALLIDUM AB TITR SER: NEGATIVE — SIGNIFICANT CHANGE UP
TSH SERPL-MCNC: 3.08 UIU/ML — SIGNIFICANT CHANGE UP (ref 0.27–4.2)
VIT B12 SERPL-MCNC: 185 PG/ML — LOW (ref 232–1245)
VIT D25+D1,25 OH+D1,25 PNL SERPL-MCNC: 39 PG/ML — SIGNIFICANT CHANGE UP (ref 19.9–79.3)
WBC # BLD: 10.25 K/UL — SIGNIFICANT CHANGE UP (ref 4.8–10.8)
WBC # FLD AUTO: 10.25 K/UL — SIGNIFICANT CHANGE UP (ref 4.8–10.8)

## 2025-07-26 PROCEDURE — 99239 HOSP IP/OBS DSCHRG MGMT >30: CPT

## 2025-07-26 RX ORDER — CLONAZEPAM 0.5 MG/1
1 TABLET ORAL
Qty: 60 | Refills: 0
Start: 2025-07-26 | End: 2025-08-24

## 2025-07-26 RX ORDER — CYANOCOBALAMIN 1000 UG/ML
1000 INJECTION INTRAMUSCULAR; SUBCUTANEOUS DAILY
Refills: 0 | Status: DISCONTINUED | OUTPATIENT
Start: 2025-07-26 | End: 2025-07-26

## 2025-07-26 RX ORDER — CYANOCOBALAMIN 1000 UG/ML
1 INJECTION INTRAMUSCULAR; SUBCUTANEOUS
Qty: 30 | Refills: 0
Start: 2025-07-26 | End: 2025-08-24

## 2025-07-26 RX ADMIN — METOPROLOL SUCCINATE 25 MILLIGRAM(S): 50 TABLET, EXTENDED RELEASE ORAL at 17:54

## 2025-07-26 RX ADMIN — HEPARIN SODIUM 5000 UNIT(S): 1000 INJECTION INTRAVENOUS; SUBCUTANEOUS at 05:10

## 2025-07-26 RX ADMIN — CYANOCOBALAMIN 1000 MICROGRAM(S): 1000 INJECTION INTRAMUSCULAR; SUBCUTANEOUS at 17:46

## 2025-07-26 RX ADMIN — Medication 81 MILLIGRAM(S): at 17:46

## 2025-07-26 RX ADMIN — METOPROLOL SUCCINATE 25 MILLIGRAM(S): 50 TABLET, EXTENDED RELEASE ORAL at 05:10

## 2025-07-26 RX ADMIN — HEPARIN SODIUM 5000 UNIT(S): 1000 INJECTION INTRAVENOUS; SUBCUTANEOUS at 17:54

## 2025-07-26 RX ADMIN — LITHIUM CARBONATE 300 MILLIGRAM(S): 600 CAPSULE, GELATIN COATED ORAL at 17:54

## 2025-07-26 RX ADMIN — CLONAZEPAM 1 MILLIGRAM(S): 0.5 TABLET ORAL at 17:46

## 2025-07-26 RX ADMIN — SERTRALINE 100 MILLIGRAM(S): 100 TABLET, FILM COATED ORAL at 17:46

## 2025-07-26 RX ADMIN — HALOPERIDOL 5 MILLIGRAM(S): 10 TABLET ORAL at 11:39

## 2025-07-26 RX ADMIN — Medication 40 MILLIGRAM(S): at 05:10

## 2025-07-26 RX ADMIN — CLOPIDOGREL BISULFATE 75 MILLIGRAM(S): 75 TABLET, FILM COATED ORAL at 17:46

## 2025-07-26 RX ADMIN — Medication 1 APPLICATION(S): at 05:11

## 2025-07-26 RX ADMIN — SERTRALINE 50 MILLIGRAM(S): 100 TABLET, FILM COATED ORAL at 17:45

## 2025-07-26 RX ADMIN — CLONAZEPAM 1 MILLIGRAM(S): 0.5 TABLET ORAL at 05:10

## 2025-07-26 RX ADMIN — LITHIUM CARBONATE 300 MILLIGRAM(S): 600 CAPSULE, GELATIN COATED ORAL at 05:10

## 2025-07-29 LAB — B BURGDOR DNA SPEC QL NAA+PROBE: NEGATIVE — SIGNIFICANT CHANGE UP

## 2025-08-02 DIAGNOSIS — Z79.82 LONG TERM (CURRENT) USE OF ASPIRIN: ICD-10-CM

## 2025-08-02 DIAGNOSIS — G93.49 OTHER ENCEPHALOPATHY: ICD-10-CM

## 2025-08-02 DIAGNOSIS — R25.1 TREMOR, UNSPECIFIED: ICD-10-CM

## 2025-08-02 DIAGNOSIS — I25.10 ATHEROSCLEROTIC HEART DISEASE OF NATIVE CORONARY ARTERY WITHOUT ANGINA PECTORIS: ICD-10-CM

## 2025-08-02 DIAGNOSIS — F43.10 POST-TRAUMATIC STRESS DISORDER, UNSPECIFIED: ICD-10-CM

## 2025-08-02 DIAGNOSIS — G25.0 ESSENTIAL TREMOR: ICD-10-CM

## 2025-08-02 DIAGNOSIS — F31.9 BIPOLAR DISORDER, UNSPECIFIED: ICD-10-CM

## 2025-08-02 DIAGNOSIS — N18.30 CHRONIC KIDNEY DISEASE, STAGE 3 UNSPECIFIED: ICD-10-CM

## 2025-08-02 DIAGNOSIS — Z79.02 LONG TERM (CURRENT) USE OF ANTITHROMBOTICS/ANTIPLATELETS: ICD-10-CM

## 2025-08-02 DIAGNOSIS — I45.10 UNSPECIFIED RIGHT BUNDLE-BRANCH BLOCK: ICD-10-CM

## 2025-08-02 DIAGNOSIS — Z95.5 PRESENCE OF CORONARY ANGIOPLASTY IMPLANT AND GRAFT: ICD-10-CM

## 2025-08-02 DIAGNOSIS — F41.9 ANXIETY DISORDER, UNSPECIFIED: ICD-10-CM

## 2025-09-16 ENCOUNTER — TRANSCRIPTION ENCOUNTER (OUTPATIENT)
Age: 75
End: 2025-09-16